# Patient Record
Sex: MALE | Race: WHITE | NOT HISPANIC OR LATINO | Employment: OTHER | URBAN - METROPOLITAN AREA
[De-identification: names, ages, dates, MRNs, and addresses within clinical notes are randomized per-mention and may not be internally consistent; named-entity substitution may affect disease eponyms.]

---

## 2017-01-05 ENCOUNTER — OFFICE VISIT (OUTPATIENT)
Dept: FAMILY MEDICINE | Facility: CLINIC | Age: 57
End: 2017-01-05
Payer: COMMERCIAL

## 2017-01-05 ENCOUNTER — TELEPHONE (OUTPATIENT)
Dept: FAMILY MEDICINE | Facility: CLINIC | Age: 57
End: 2017-01-05

## 2017-01-05 VITALS
HEART RATE: 78 BPM | BODY MASS INDEX: 31.4 KG/M2 | OXYGEN SATURATION: 98 % | WEIGHT: 212 LBS | HEIGHT: 69 IN | DIASTOLIC BLOOD PRESSURE: 88 MMHG | TEMPERATURE: 98.4 F | RESPIRATION RATE: 16 BRPM | SYSTOLIC BLOOD PRESSURE: 132 MMHG

## 2017-01-05 DIAGNOSIS — M94.0 COSTOCHONDRITIS: Primary | ICD-10-CM

## 2017-01-05 DIAGNOSIS — R10.9 FLANK PAIN: ICD-10-CM

## 2017-01-05 LAB
ALBUMIN UR-MCNC: NEGATIVE MG/DL
APPEARANCE UR: CLEAR
BILIRUB UR QL STRIP: NEGATIVE
COLOR UR AUTO: YELLOW
GLUCOSE UR STRIP-MCNC: NEGATIVE MG/DL
HGB UR QL STRIP: ABNORMAL
KETONES UR STRIP-MCNC: NEGATIVE MG/DL
LEUKOCYTE ESTERASE UR QL STRIP: NEGATIVE
MUCOUS THREADS #/AREA URNS LPF: PRESENT /LPF
NITRATE UR QL: NEGATIVE
PH UR STRIP: 7 PH (ref 5–7)
RBC #/AREA URNS AUTO: ABNORMAL /HPF (ref 0–2)
SP GR UR STRIP: 1.01 (ref 1–1.03)
URN SPEC COLLECT METH UR: ABNORMAL
UROBILINOGEN UR STRIP-ACNC: 0.2 EU/DL (ref 0.2–1)
WBC #/AREA URNS AUTO: ABNORMAL /HPF (ref 0–2)

## 2017-01-05 PROCEDURE — 81001 URINALYSIS AUTO W/SCOPE: CPT | Performed by: NURSE PRACTITIONER

## 2017-01-05 PROCEDURE — 99213 OFFICE O/P EST LOW 20 MIN: CPT | Performed by: NURSE PRACTITIONER

## 2017-01-05 RX ORDER — MELOXICAM 7.5 MG/1
7.5 TABLET ORAL DAILY
Qty: 30 TABLET | Refills: 1 | Status: SHIPPED | OUTPATIENT
Start: 2017-01-05 | End: 2018-07-10

## 2017-01-05 NOTE — NURSING NOTE
"Chief Complaint   Patient presents with     Flank Pain       Initial /92 mmHg  Pulse 78  Temp(Src) 98.4  F (36.9  C) (Tympanic)  Resp 16  Ht 5' 8.5\" (1.74 m)  Wt 212 lb (96.163 kg)  BMI 31.76 kg/m2  SpO2 98% Estimated body mass index is 31.76 kg/(m^2) as calculated from the following:    Height as of this encounter: 5' 8.5\" (1.74 m).    Weight as of this encounter: 212 lb (96.163 kg).  BP completed using cuff size: large  "

## 2017-01-05 NOTE — TELEPHONE ENCOUNTER
Spoke with patient regarding trace of blood- push fluids. If pain worsens or has hematuria needs to follow up in clinic. BRET Cooper CNP

## 2017-01-05 NOTE — PATIENT INSTRUCTIONS
Costochondritis  Costochondritis is inflammation of a rib or the cartilage that connects a rib to your breastbone (sternum). It causes tenderness, and sometimes chest pain may be sharp or aching, or it may feel like pressure. Pain may get worse with deep breathing, movement, or exercise. In some cases, the pain is mistaken for a heart attack. Despite this, the condition is not serious. Read on to learn more about the condition and how it can be treated.    What causes costochondritis?  The cause of costochondritis is not completely clear, but it may happen after a chest injury, chest infection or coughing episode. Some physical activities can sometimes lead to costochondritis. Large-breasted women may be more likely to have the condition. Often, the reason for the inflammation is unknown.  Diagnosing costochondritis  There is no test for costochrondritis. The condition is diagnosed by the symptoms you have. In some cases, tests are done to rule out more serious problems. These tests may include imaging tests such as chest X-ray or CT scan.  Treating costochondritis  If an underlying cause is found, treatment for that will likely relieve the problem. Costochondritis often goes away on its own. The course of the condition varies from person to person. It usually lasts from weeks to months. In some cases, mild symptoms continue for months to years. To ease symptoms:    Take medications as directed. These relieve pain and swelling. Ibuprofen or other NSAIDs are often recommended. In some cases, you may be given prescription medication, such as muscle relaxants.    Avoid activities that put stress on the chest or spine.    Apply a heating pad (set to warm, not to high, heat) to the breastbone several times a day.    Perform stretching exercises as directed  Call the health care provider right away if you have any of the following:    Pain that is not relieved by medication    Shortness of breath    Lightheadedness,  dizziness, or fainting    Feeling of irregular heartbeat or fast pulse  Anyone with chest pain should see a doctor, especially those who are older and may be at risk for heart disease.     8314-8690 [employer]. [address] [place] [address], [place] [address]. All rights reserved. This information is not intended as a substitute for professional medical care. Always follow your healthcare professional's instructions.

## 2017-01-05 NOTE — MR AVS SNAPSHOT
After Visit Summary   1/5/2017    Steven Buck    MRN: [id number]           Patient Information     Date Of Birth          1960        Visit Information        Provider Department      1/5/2017 10:40 AM Rima Roman APRN [facility]        Today's Diagnoses     Flank pain    -  1     Costochondritis           Care Instructions      Costochondritis  Costochondritis is inflammation of a rib or the cartilage that connects a rib to your breastbone (sternum). It causes tenderness, and sometimes chest pain may be sharp or aching, or it may feel like pressure. Pain may get worse with deep breathing, movement, or exercise. In some cases, the pain is mistaken for a heart attack. Despite this, the condition is not serious. Read on to learn more about the condition and how it can be treated.    What causes costochondritis?  The cause of costochondritis is not completely clear, but it may happen after a chest injury, chest infection or coughing episode. Some physical activities can sometimes lead to costochondritis. Large-breasted women may be more likely to have the condition. Often, the reason for the inflammation is unknown.  Diagnosing costochondritis  There is no test for costochrondritis. The condition is diagnosed by the symptoms you have. In some cases, tests are done to rule out more serious problems. These tests may include imaging tests such as chest X-ray or CT scan.  Treating costochondritis  If an underlying cause is found, treatment for that will likely relieve the problem. Costochondritis often goes away on its own. The course of the condition varies from person to person. It usually lasts from weeks to months. In some cases, mild symptoms continue for months to years. To ease symptoms:    Take medications as directed. These relieve pain and swelling. Ibuprofen or other NSAIDs are often recommended. In some cases, you may be given prescription medication, such  "as muscle relaxants.    Avoid activities that put stress on the chest or spine.    Apply a heating pad (set to warm, not to high, heat) to the breastbone several times a day.    Perform stretching exercises as directed  Call the health care provider right away if you have any of the following:    Pain that is not relieved by medication    Shortness of breath    Lightheadedness, dizziness, or fainting    Feeling of irregular heartbeat or fast pulse  Anyone with chest pain should see a doctor, especially those who are older and may be at risk for heart disease.     1414-3601 [employer]. [address] [place] [address]. All rights reserved. This information is not intended as a substitute for professional medical care. Always follow your healthcare professional's instructions.              Follow-ups after your visit        Who to contact     If you have questions or need follow up information about today's clinic visit or your schedule please contact [facility] directly at [phone].  Normal or non-critical lab and imaging results will be communicated to you by Enthusehart, letter or phone within 4 business days after the clinic has received the results. If you do not hear from us within 7 days, please contact the clinic through [employer] or phone. If you have a critical or abnormal lab result, we will notify you by phone as soon as possible.  Submit refill requests through Vindi or call your pharmacy and they will forward the refill request to us. Please allow 3 business days for your refill to be completed.          Additional Information About Your Visit        Enthuse[employer] medic Information     Vindi lets you send messages to your doctor, view your test results, renew your prescriptions, schedule appointments and more. To sign up, go to [email or web address] . Click on \"Log in\" on the left side of the screen, which will take you to the Welcome page. Then click on \"Sign up Now\" on " "the right side of the page.     You will be asked to enter the access code listed below, as well as some personal information. Please follow the directions to create your username and password.     Your access code is: [email or web address]  Expires: 2017 11:10 AM     Your access code will  in 90 days. If you need help or a new code, please call your [facility] clinic or [phone].        Care EveryWhere ID     This is your Care EveryWhere ID. This could be used by other organizations to access your [facility] medical records  [id number]        Your Vitals Were     Pulse Temperature Respirations Height BMI (Body Mass Index) Pulse Oximetry    78 98.4  F (36.9  C) (Tympanic) 16 5' 8.5\" (1.74 m) 31.76 kg/m2 98%       Blood Pressure from Last 3 Encounters:   17 142/92   16 139/89   16 139/86    Weight from Last 3 Encounters:   17 212 lb (96.163 kg)   16 201 lb (91.173 kg)   16 202 lb (91.627 kg)              We Performed the Following     *UA reflex to Microscopic and Culture ([facility] (except [place] and [place])          Today's Medication Changes          These changes are accurate as of: 17 11:10 AM.  If you have any questions, ask your nurse or doctor.               Start taking these medicines.        Dose/Directions    meloxicam 7.5 MG tablet   Commonly known as:  MOBIC   Used for:  Costochondritis   Started by:  Rima Roman APRN CNP        Dose:  7.5 mg   Take 1 tablet (7.5 mg) by mouth daily   Quantity:  30 tablet   Refills:  1            Where to get your medicines      These medications were sent to [facility] [place], [place] - [address]  [address] [address]     Phone:  [phone]    - meloxicam 7.5 MG tablet             Primary Care Provider Office Phone # Fax #    Lety Barger [phone]       [facility] [address] [facility] [address] [address]      "   Thank you!     Thank you for choosing [facility]  for your care. Our goal is always to provide you with excellent care. Hearing back from our patients is one way we can continue to improve our services. Please take a few minutes to complete the written survey that you may receive in the mail after your visit with us. Thank you!             Your Updated Medication List - Protect others around you: Learn how to safely use, store and throw away your medicines at www.disposemymeds.org.          This list is accurate as of: 1/5/17 11:10 AM.  Always use your most recent med list.                   Brand Name Dispense Instructions for use    amLODIPine 5 MG tablet    NORVASC    90 tablet    Take 1 tablet (5 mg) by mouth daily       atorvastatin 20 MG tablet    LIPITOR    90 tablet    Take 1 tablet (20 mg) by mouth daily       lisinopril-hydrochlorothiazide 20-25 MG per tablet    PRINZIDE/ZESTORETIC    90 tablet    Take 1 tablet by mouth every morning       meloxicam 7.5 MG tablet    MOBIC    30 tablet    Take 1 tablet (7.5 mg) by mouth daily

## 2017-01-05 NOTE — PROGRESS NOTES
SUBJECTIVE:                                                    Steven Buck is a 56 year old male who presents to clinic today for the following health issues:      FLANK PAIN     Onset: 4 days. Drove down to [place], on cruise wed 28- Monday 1/2. Pain awoke him from sleep early morning hours of 1/2, saw provider on cruise ship. Drove back from [place] with no difficulties.      Description: patient states he was on a cruise last week, unsure if he pulled something dancing or if maybe he has a kidney problem.  Character: Sharp and Stabbing  Location: bilateral flank pain- lateral sides  Radiation: None    Intensity: moderate to severe    Progression of Symptoms:  improving and constant    Accompanying Signs & Symptoms:  Fever/Chills?: no   Gas/Bloating: no   Nausea: YES- seasick but was on a cruise last week  Vomitting: no   Diarrhea?: no   Constipation:no last bowel movement was this morning and normal  Dysuria or Hematuria: no   Denies calf pain or swelling, no dyspnea, no pain with breaths, no dizziness, chest pain, diaphoresis, nausea. No hx of blood clots. Denies dysuria or blood in urine. No hx of kidney stones.    History:   Trauma: no - had started to doing plank exercises to build core and help with chronic low back pain  Previous similar pain: no, had pleurisy 8-10 years ago which feels similar but was in a different location in his back  Previous tests done: Colonoscopy at age 52    Precipitating factors:   Does the pain change with:     Food: no      BM: no     Urination: no   Changes with upper body movement, lateral bending and twisting aggravate, no pain at rest or with walking.     Alleviating factors:  none    Therapies Tried and outcome: flexeril, naproxen, and ketorolac injection    LMP:  not applicable       Problem list and histories reviewed & adjusted, as indicated.  Additional history: as documented    Patient Active Problem List   Diagnosis     Hyperlipidemia LDL goal <100      "Hypertension goal BP (blood pressure) < 140/90     Essential hypertension     Past Surgical History   Procedure Laterality Date     Surgical history of -   1995     RIGHT knee arthroscopic surgery     Surgical history of -   1/16/97     vasectomy     Orthopedic surgery       Repair tendon biceps distal upper extremity  6/27/2014     Procedure: REPAIR TENDON BICEPS DISTAL UPPER EXTREMITY;  Surgeon: Ryan Joe MD;  Location: [facility] OR       Social History   Substance Use Topics     Smoking status: Former Smoker     Quit date: 01/04/1986     Smokeless tobacco: Never Used     Alcohol Use: 0.0 oz/week     0 Standard drinks or equivalent per week      Comment: a few drinks now and then     Family History   Problem Relation Age of Onset     Hypertension Mother      Hypertension Father      C.A.D. Father      Cardiovascular Father      Hypertension Maternal Grandmother      Hypertension Maternal Grandfather      Hypertension Brother            ROS:  Constitutional, HEENT, cardiovascular, pulmonary, gi and gu systems are negative, except as otherwise noted.    OBJECTIVE:                                                    /92 mmHg  Pulse 78  Temp(Src) 98.4  F (36.9  C) (Tympanic)  Resp 16  Ht 5' 8.5\" (1.74 m)  Wt 212 lb (96.163 kg)  BMI 31.76 kg/m2  SpO2 98%  Body mass index is 31.76 kg/(m^2).  GENERAL: healthy, alert and no distress  RESP: lungs clear to auscultation - no rales, rhonchi or wheezes  CV: regular rate and rhythm, normal S1 S2, no S3 or S4, no murmur, click or rub, no peripheral edema   ABDOMEN: soft, nontender, no hepatosplenomegaly, no masses and bowel sounds normal  MS: no chest wall tenderness, pain exacerbated by side flexion of torso.   SKIN: no suspicious lesions or rashes  BACK: no CVA tenderness, no paralumbar tenderness    Diagnostic Test Results:  Results for orders placed or performed in visit on 01/05/17 (from the past 24 hour(s))   *UA reflex to Microscopic and Culture " ([facility] (except [place] and [place])   Result Value Ref Range    Color Urine Yellow     Appearance Urine Clear     Glucose Urine Negative NEG mg/dL    Bilirubin Urine Negative NEG    Ketones Urine Negative NEG mg/dL    Specific Gravity Urine 1.015 1.003 - 1.035    Blood Urine Trace (A) NEG    pH Urine 7.0 5.0 - 7.0 pH    Protein Albumin Urine Negative NEG mg/dL    Urobilinogen Urine 0.2 0.2 - 1.0 EU/dL    Nitrite Urine Negative NEG    Leukocyte Esterase Urine Negative NEG    Source Midstream Urine    Urine Microscopic   Result Value Ref Range    WBC Urine O - 2 0 - 2 /HPF    RBC Urine O - 2 0 - 2 /HPF    Mucous Urine Present (A) NEG /LPF        ASSESSMENT/PLAN:                                                        ICD-10-CM    1. Costochondritis M94.0 meloxicam (MOBIC) 7.5 MG tablet   2. Flank pain R10.9 *UA reflex to Microscopic and Culture ([facility] (except [place] and [place])     Urine Microscopic     Unlikely kidney stone related, although trace of blood is in the urine. Advised to push fluids and return for any worsening pain or hematuria. ? Mild rhabdo from plank exercises, again advised to push fluids and watch for worsening sx Has not done exercise for over 1 week. Apply heat and take NSAID as directed. Gentle stretching as discussed in clinic.     Patient Instructions       Costochondritis  Costochondritis is inflammation of a rib or the cartilage that connects a rib to your breastbone (sternum). It causes tenderness, and sometimes chest pain may be sharp or aching, or it may feel like pressure. Pain may get worse with deep breathing, movement, or exercise. In some cases, the pain is mistaken for a heart attack. Despite this, the condition is not serious. Read on to learn more about the condition and how it can be treated.    What causes costochondritis?  The cause of costochondritis is not completely clear, but it may happen after  a chest injury, chest infection or coughing episode. Some physical activities can sometimes lead to costochondritis. Large-breasted women may be more likely to have the condition. Often, the reason for the inflammation is unknown.  Diagnosing costochondritis  There is no test for costochrondritis. The condition is diagnosed by the symptoms you have. In some cases, tests are done to rule out more serious problems. These tests may include imaging tests such as chest X-ray or CT scan.  Treating costochondritis  If an underlying cause is found, treatment for that will likely relieve the problem. Costochondritis often goes away on its own. The course of the condition varies from person to person. It usually lasts from weeks to months. In some cases, mild symptoms continue for months to years. To ease symptoms:    Take medications as directed. These relieve pain and swelling. Ibuprofen or other NSAIDs are often recommended. In some cases, you may be given prescription medication, such as muscle relaxants.    Avoid activities that put stress on the chest or spine.    Apply a heating pad (set to warm, not to high, heat) to the breastbone several times a day.    Perform stretching exercises as directed  Call the health care provider right away if you have any of the following:    Pain that is not relieved by medication    Shortness of breath    Lightheadedness, dizziness, or fainting    Feeling of irregular heartbeat or fast pulse  Anyone with chest pain should see a doctor, especially those who are older and may be at risk for heart disease.     5088-0537 [employer]. [address] [place] [address], [place] [address]. All rights reserved. This information is not intended as a substitute for professional medical care. Always follow your healthcare professional's instructions.              BRET [facility]

## 2017-03-28 DIAGNOSIS — E78.5 HYPERLIPIDEMIA LDL GOAL <100: ICD-10-CM

## 2017-03-28 DIAGNOSIS — I10 HYPERTENSION GOAL BP (BLOOD PRESSURE) < 140/90: ICD-10-CM

## 2017-03-28 LAB
ANION GAP SERPL CALCULATED.3IONS-SCNC: 7 MMOL/L (ref 3–14)
BUN SERPL-MCNC: 15 MG/DL (ref 7–30)
CALCIUM SERPL-MCNC: 8.9 MG/DL (ref 8.5–10.1)
CHLORIDE SERPL-SCNC: 103 MMOL/L (ref 94–109)
CHOLEST SERPL-MCNC: 191 MG/DL
CO2 SERPL-SCNC: 31 MMOL/L (ref 20–32)
CREAT SERPL-MCNC: 0.86 MG/DL (ref 0.66–1.25)
GFR SERPL CREATININE-BSD FRML MDRD: NORMAL ML/MIN/1.7M2
GLUCOSE SERPL-MCNC: 90 MG/DL (ref 70–99)
HDLC SERPL-MCNC: 83 MG/DL
LDLC SERPL CALC-MCNC: 86 MG/DL
NONHDLC SERPL-MCNC: 108 MG/DL
POTASSIUM SERPL-SCNC: 3.8 MMOL/L (ref 3.4–5.3)
SODIUM SERPL-SCNC: 141 MMOL/L (ref 133–144)
TRIGL SERPL-MCNC: 109 MG/DL

## 2017-03-28 PROCEDURE — 80048 BASIC METABOLIC PNL TOTAL CA: CPT | Performed by: NURSE PRACTITIONER

## 2017-03-28 PROCEDURE — 80061 LIPID PANEL: CPT | Performed by: NURSE PRACTITIONER

## 2017-03-28 PROCEDURE — 36415 COLL VENOUS BLD VENIPUNCTURE: CPT | Performed by: NURSE PRACTITIONER

## 2017-03-28 NOTE — PROGRESS NOTES
All of your lab results are normal.  Cholesterol looks GREAT!    Please notify patient of results.  MARY Ferraro

## 2017-03-28 NOTE — LETTER
[facility]  [address] [address]  [phone]      March 28, 2017      Steven Buck  [address] [address]        Dear Steven,        We are writing to inform you of the results from your recent lab work, included is a copy of those results.  Component      Latest Ref Rng & Units 3/28/2017   Sodium      133 - 144 mmol/L 141   Potassium      3.4 - 5.3 mmol/L 3.8   Chloride      94 - 109 mmol/L 103   Carbon Dioxide      20 - 32 mmol/L 31   Anion Gap      3 - 14 mmol/L 7   Glucose      70 - 99 mg/dL 90   Urea Nitrogen      7 - 30 mg/dL 15   Creatinine      0.66 - 1.25 mg/dL 0.86   GFR Estimate      >60 mL/min/1.7m2 >90 . . .   GFR Estimate If Black      >60 mL/min/1.7m2 >90 . . .   Calcium      8.5 - 10.1 mg/dL 8.9   Cholesterol      <200 mg/dL 191   Triglycerides      <150 mg/dL 109   HDL Cholesterol      >39 mg/dL 83   LDL Cholesterol Calculated      <100 mg/dL 86   Non HDL Cholesterol      <130 mg/dL 108     All of your lab results are normal.   Cholesterol looks GREAT!   If you have any questions, please do not hesitate to call our office.      Sincerely,  MARY Ferraro

## 2017-06-18 DIAGNOSIS — I10 HYPERTENSION GOAL BP (BLOOD PRESSURE) < 140/90: ICD-10-CM

## 2017-06-18 DIAGNOSIS — E78.5 HYPERLIPIDEMIA LDL GOAL <100: ICD-10-CM

## 2017-06-19 NOTE — TELEPHONE ENCOUNTER
Amlodipine and lisinopril/hctz      Last Written Prescription Date: 03/21/2017  Last Fill Quantity: 90, # refills: 0  Last Office Visit with [facility], [facility] or [facility] prescribing provider: 01/05/2017       Potassium   Date Value Ref Range Status   03/28/2017 3.8 3.4 - 5.3 mmol/L Final     Creatinine   Date Value Ref Range Status   03/28/2017 0.86 0.66 - 1.25 mg/dL Final     BP Readings from Last 3 Encounters:   01/05/17 132/88   02/24/16 139/89   01/19/16 139/86         Atorvastatin     Last Written Prescription Date: 03/21/2017  Last Fill Quantity: 90, # refills: 0  Last Office Visit with [facility], [facility] or [facility] prescribing provider: 01/05/2017       Lab Results   Component Value Date    CHOL 191 03/28/2017     Lab Results   Component Value Date    HDL 83 03/28/2017     Lab Results   Component Value Date    LDL 86 03/28/2017     Lab Results   Component Value Date    TRIG 109 03/28/2017     Lab Results   Component Value Date    CHOLHDLRATIO 2.3 02/10/2015       Juan GREER (R)

## 2017-06-23 RX ORDER — AMLODIPINE BESYLATE 5 MG/1
TABLET ORAL
Qty: 90 TABLET | Refills: 2 | Status: SHIPPED | OUTPATIENT
Start: 2017-06-23 | End: 2018-03-20

## 2017-06-23 RX ORDER — ATORVASTATIN CALCIUM 20 MG/1
TABLET, FILM COATED ORAL
Qty: 90 TABLET | Refills: 2 | Status: SHIPPED | OUTPATIENT
Start: 2017-06-23 | End: 2018-03-20

## 2017-06-23 RX ORDER — LISINOPRIL AND HYDROCHLOROTHIAZIDE 20; 25 MG/1; MG/1
TABLET ORAL
Qty: 90 TABLET | Refills: 2 | Status: SHIPPED | OUTPATIENT
Start: 2017-06-23 | End: 2018-03-20

## 2018-01-08 ENCOUNTER — OFFICE VISIT (OUTPATIENT)
Dept: FAMILY MEDICINE | Facility: CLINIC | Age: 58
End: 2018-01-08
Payer: COMMERCIAL

## 2018-01-08 VITALS
HEART RATE: 95 BPM | SYSTOLIC BLOOD PRESSURE: 159 MMHG | WEIGHT: 207 LBS | DIASTOLIC BLOOD PRESSURE: 88 MMHG | HEIGHT: 68 IN | OXYGEN SATURATION: 96 % | TEMPERATURE: 99.5 F | BODY MASS INDEX: 31.37 KG/M2 | RESPIRATION RATE: 24 BRPM

## 2018-01-08 DIAGNOSIS — J20.8 ACUTE VIRAL BRONCHITIS: Primary | ICD-10-CM

## 2018-01-08 PROCEDURE — 99213 OFFICE O/P EST LOW 20 MIN: CPT | Performed by: INTERNAL MEDICINE

## 2018-01-08 RX ORDER — BENZONATATE 200 MG/1
200 CAPSULE ORAL 3 TIMES DAILY PRN
Qty: 21 CAPSULE | Refills: 0 | Status: SHIPPED | OUTPATIENT
Start: 2018-01-08 | End: 2018-07-10

## 2018-01-08 NOTE — NURSING NOTE
"Chief Complaint   Patient presents with     URI     x 4 days, had cold sypmtoms before Ron which seemed to have resolved. no fever associated        Initial /90 (BP Location: Right arm, Patient Position: Chair, Cuff Size: Adult Regular)  Pulse 95  Temp 99.5  F (37.5  C) (Tympanic)  Resp 24  Ht 5' 7.5\" (1.715 m)  Wt 207 lb (93.9 kg)  SpO2 96%  BMI 31.94 kg/m2 Estimated body mass index is 31.94 kg/(m^2) as calculated from the following:    Height as of this encounter: 5' 7.5\" (1.715 m).    Weight as of this encounter: 207 lb (93.9 kg).  Medication Reconciliation: complete   Celine BAILEY CMA ([facility])    "

## 2018-01-08 NOTE — MR AVS SNAPSHOT
After Visit Summary   1/8/2018    Steven Buck    MRN: [id number]           Patient Information     Date Of Birth          1960        Visit Information        Provider Department      1/8/2018 10:20 AM Carlos Alberto Nair MD [facility]        Today's Diagnoses     Acute viral bronchitis    -  1      Care Instructions    If you get any fever or if your symptoms are not improved in a week, please call and I can send an antibiotic over to the pharmacy.      Check your blood pressure a few times per week over the next couple weeks to make sure it is coming back down.  You can also come back here for a free nurse blood pressure check.  If it remains over 140/90, please let us know.        Bronchitis, Viral (Adult)    You have a viral bronchitis. Bronchitis is inflammation and swelling of the lining of the lungs. This is often caused by an infection. Symptoms include a dry, hacking cough that is worse at night. The cough may bring up yellow-green mucus. You may also feel short of breath or wheeze. Other symptoms may include tiredness, chest discomfort, and chills.  Bronchitis that is caused by a virus is not treated with antibiotics. Instead, medicines may be given to help relieve symptoms. Symptoms can last up to 2 weeks, although the cough may last much longer.  This illness is contagious during the first few days and is spread through the air by coughing and sneezing, or by direct contact (touching the sick person and then touching your own eyes, nose, or mouth).  Most viral illnesses resolve within 10 to 14 days with rest and simple home remedies, although they may sometimes last for several weeks.  Home care    If symptoms are severe, rest at home for the first 2 to 3 days. When you go back to your usual activities, don't let yourself get too tired.    Do not smoke. Also avoid being exposed to secondhand smoke.    You may use over-the-counter medicine to control fever or pain,  unless another pain medicine was prescribed. (Note: If you have chronic liver or kidney disease or have ever had a stomach ulcer or gastrointestinal bleeding, talk with your healthcare provider before using these medicines. Also talk to your provider if you are taking medicine to prevent blood clots.) Aspirin should never be given to anyone younger than 18 years of age who is ill with a viral infection or fever. It may cause severe liver or brain damage.    Your appetite may be poor, so a light diet is fine. Avoid dehydration by drinking 6 to 8 glasses of fluids per day (such as water, soft drinks, sports drinks, juices, tea, or soup). Extra fluids will help loosen secretions in the nose and lungs.    Over-the-counter cough, cold, and sore-throat medicines will not shorten the length of the illness, but they may help to reduce symptoms. (Note: Do not use decongestants if you have high blood pressure.)  Follow-up care  Follow up with your healthcare provider, or as advised. If you had an X-ray or ECG (electrocardiogram), a specialist will review it. You will be notified of any new findings that may affect your care.  Note: If you are age 65 or older, or if you have a chronic lung disease or condition that affects your immune system, or you smoke, talk to your healthcare provider about having pneumococcal vaccinations and a yearly influenza vaccination (flu shot).  When to seek medical advice  Call your healthcare provider right away if any of these occur:    Fever of 100.4 F (38 C) or higher    Coughing up increased amounts of colored sputum    Weakness, drowsiness, headache, facial pain, ear pain, or a stiff neck  Call 911, or get immediate medical care  Contact emergency services right away if any of these occur:    Coughing up blood    Worsening weakness, drowsiness, headache, or stiff neck    Trouble breathing, wheezing, or pain with breathing  Date Last Reviewed: 9/13/2015 2000-2017 [employer]  "[address] [place] [address]. All rights reserved. This information is not intended as a substitute for professional medical care. Always follow your healthcare professional's instructions.                Follow-ups after your visit        Who to contact     If you have questions or need follow up information about today's clinic visit or your schedule please contact [facility] directly at [phone].  Normal or non-critical lab and imaging results will be communicated to you by MyChart, letter or phone within 4 business days after the clinic has received the results. If you do not hear from us within 7 days, please contact the clinic through [facility] [employer] or phone. If you have a critical or abnormal lab result, we will notify you by phone as soon as possible.  Submit refill requests through [email or web address] or call your pharmacy and they will forward the refill request to us. Please allow 3 business days for your refill to be completed.          Additional Information About Your Visit        [employer] Information     [email or web address] lets you send messages to your doctor, view your test results, renew your prescriptions, schedule appointments and more. To sign up, go to [email or web address] . Click on \"Log in\" on the left side of the screen, which will take you to the Welcome page. Then click on \"Sign up Now\" on the right side of the page.     You will be asked to enter the access code listed below, as well as some personal information. Please follow the directions to create your username and password.     Your access code is: [email or web address]  Expires: 2018 10:36 AM     Your access code will  in 90 days. If you need help or a new code, please call your [facility] or [phone].        Care EveryWhere ID     This is your Care EveryWhere ID. This could be used by other organizations to access your [facility] medical records  [id number]        Your Vitals Were     Pulse Temperature Respirations " "Height Pulse Oximetry BMI (Body Mass Index)    95 99.5  F (37.5  C) (Tympanic) 24 5' 7.5\" (1.715 m) 96% 31.94 kg/m2       Blood Pressure from Last 3 Encounters:   01/08/18 159/88   01/05/17 132/88   02/24/16 139/89    Weight from Last 3 Encounters:   01/08/18 207 lb (93.9 kg)   01/05/17 212 lb (96.2 kg)   02/24/16 201 lb (91.2 kg)              Today, you had the following     No orders found for display         Today's Medication Changes          These changes are accurate as of: 1/8/18 10:36 AM.  If you have any questions, ask your nurse or doctor.               Start taking these medicines.        Dose/Directions    benzonatate 200 MG capsule   Commonly known as:  TESSALON   Used for:  Acute viral bronchitis   Started by:  Carlos Alberto Nair MD        Dose:  200 mg   Take 1 capsule (200 mg) by mouth 3 times daily as needed for cough   Quantity:  21 capsule   Refills:  0            Where to get your medicines      These medications [place] [facility] [address] [facility]  [address] [facility] [address]     Phone:  [phone]     benzonatate 200 MG capsule                Primary Care Provider Office Phone # Fax #    Lety De Leondeangelo Bargre [phone]       [address] [facility] [address]        Equal Access to Services     TRAVIS BRADLEY AH: Hadii jose carlos ku hadasho Soomaali, waaxda luqadaha, qaybta kaalmada adeegyada, sanjeev mckeon. So [facility] [phone].    ATENCIÓN: Si habla español, tiene a catalan disposición servicios gratuitos de asistencia lingüística. Melonie arnold [phone].    We comply with applicable federal civil rights laws and [place] laws. We do not discriminate on the basis of race, color, national origin, age, disability, sex, sexual orientation, or gender identity.            Thank you!     Thank you for choosing [facility]  for your care. Our goal is always to provide you with excellent care. Hearing back from " our patients is one way we can continue to improve our services. Please take a few minutes to complete the written survey that you may receive in the mail after your visit with us. Thank you!             Your Updated Medication List - Protect others around you: Learn how to safely use, store and throw away your medicines at www.disposemymeds.org.          This list is accurate as of: 1/8/18 10:36 AM.  Always use your most recent med list.                   Brand Name Dispense Instructions for use Diagnosis    amLODIPine 5 MG tablet    NORVASC    90 tablet    TAKE 1 TABLET DAILY    Hypertension goal BP (blood pressure) < 140/90       atorvastatin 20 MG tablet    LIPITOR    90 tablet    TAKE 1 TABLET DAILY    Hyperlipidemia LDL goal <100       benzonatate 200 MG capsule    TESSALON    21 capsule    Take 1 capsule (200 mg) by mouth 3 times daily as needed for cough    Acute viral bronchitis       lisinopril-hydrochlorothiazide 20-25 MG per tablet    PRINZIDE/ZESTORETIC    90 tablet    TAKE 1 TABLET EVERY MORNING    Hypertension goal BP (blood pressure) < 140/90       meloxicam 7.5 MG tablet    MOBIC    30 tablet    Take 1 tablet (7.5 mg) by mouth daily    Costochondritis

## 2018-01-08 NOTE — PATIENT INSTRUCTIONS
If you get any fever or if your symptoms are not improved in a week, please call and I can send an antibiotic over to the pharmacy.      Check your blood pressure a few times per week over the next couple weeks to make sure it is coming back down.  You can also come back here for a free nurse blood pressure check.  If it remains over 140/90, please let us know.        Bronchitis, Viral (Adult)    You have a viral bronchitis. Bronchitis is inflammation and swelling of the lining of the lungs. This is often caused by an infection. Symptoms include a dry, hacking cough that is worse at night. The cough may bring up yellow-green mucus. You may also feel short of breath or wheeze. Other symptoms may include tiredness, chest discomfort, and chills.  Bronchitis that is caused by a virus is not treated with antibiotics. Instead, medicines may be given to help relieve symptoms. Symptoms can last up to 2 weeks, although the cough may last much longer.  This illness is contagious during the first few days and is spread through the air by coughing and sneezing, or by direct contact (touching the sick person and then touching your own eyes, nose, or mouth).  Most viral illnesses resolve within 10 to 14 days with rest and simple home remedies, although they may sometimes last for several weeks.  Home care    If symptoms are severe, rest at home for the first 2 to 3 days. When you go back to your usual activities, don't let yourself get too tired.    Do not smoke. Also avoid being exposed to secondhand smoke.    You may use over-the-counter medicine to control fever or pain, unless another pain medicine was prescribed. (Note: If you have chronic liver or kidney disease or have ever had a stomach ulcer or gastrointestinal bleeding, talk with your healthcare provider before using these medicines. Also talk to your provider if you are taking medicine to prevent blood clots.) Aspirin should never be given to anyone younger than 18  years of age who is ill with a viral infection or fever. It may cause severe liver or brain damage.    Your appetite may be poor, so a light diet is fine. Avoid dehydration by drinking 6 to 8 glasses of fluids per day (such as water, soft drinks, sports drinks, juices, tea, or soup). Extra fluids will help loosen secretions in the nose and lungs.    Over-the-counter cough, cold, and sore-throat medicines will not shorten the length of the illness, but they may help to reduce symptoms. (Note: Do not use decongestants if you have high blood pressure.)  Follow-up care  Follow up with your healthcare provider, or as advised. If you had an X-ray or ECG (electrocardiogram), a specialist will review it. You will be notified of any new findings that may affect your care.  Note: If you are age 65 or older, or if you have a chronic lung disease or condition that affects your immune system, or you smoke, talk to your healthcare provider about having pneumococcal vaccinations and a yearly influenza vaccination (flu shot).  When to seek medical advice  Call your healthcare provider right away if any of these occur:    Fever of 100.4 F (38 C) or higher    Coughing up increased amounts of colored sputum    Weakness, drowsiness, headache, facial pain, ear pain, or a stiff neck  Call 911, or get immediate medical care  Contact emergency services right away if any of these occur:    Coughing up blood    Worsening weakness, drowsiness, headache, or stiff neck    Trouble breathing, wheezing, or pain with breathing  Date Last Reviewed: 9/13/2015 2000-2017 [employer]. [address] [place] [address], [place] [address]. All rights reserved. This information is not intended as a substitute for professional medical care. Always follow your healthcare professional's instructions.

## 2018-01-08 NOTE — PROGRESS NOTES
SUBJECTIVE:   Steven Buck is a 57 year old male who presents to clinic today for the following health issues:  Chief Complaint   Patient presents with     URI     x 4 days, had cold sypmtoms before Ron which seemed to have resolved. no fever associated        ENT Symptoms             Symptoms: cc Present Absent Comment   Fever/Chills   x    Fatigue   x    Muscle Aches   x    Eye Irritation   x    Sneezing   x    Nasal Grey/Drg   x    Sinus Pressure/Pain   x    Loss of smell   x    Dental pain   x    Sore Throat  x  Pain w/ coughing, not swallowing.   Some pain w/ pressure on throat/neck as well   Swollen Glands   x    Ear Pain/Fullness   x    Cough x x  Productive of phlegm   Wheeze   x    Chest Pain   x    Shortness of breath   x    Rash   x    Other         Symptom duration:  x 4 days    Symptom severity:  moderated    Treatments tried:  nyquil - helping   Contacts:  spouse runs        Steven was sick with a URI over [place] time for about 2 weeks but this cleared for about a week before his current symptoms developed, which are mostly just cough, which he didn't really have initially.      Problem list and histories reviewed & adjusted, as indicated.  Additional history: as documented    Patient Active Problem List   Diagnosis     Hyperlipidemia LDL goal <100     Hypertension goal BP (blood pressure) < 140/90     Essential hypertension     Past Surgical History:   Procedure Laterality Date     ORTHOPEDIC SURGERY       REPAIR TENDON BICEPS DISTAL UPPER EXTREMITY  6/27/2014    Procedure: REPAIR TENDON BICEPS DISTAL UPPER EXTREMITY;  Surgeon: Ryan Joe MD;  Location: [facility] OR     SURGICAL HISTORY OF -   1995    RIGHT knee arthroscopic surgery     SURGICAL HISTORY OF -   1/16/97    vasectomy       Social History   Substance Use Topics     Smoking status: Former Smoker     Quit date: 1/4/1986     Smokeless tobacco: Never Used     Alcohol use 0.0 oz/week     0 Standard drinks or  "equivalent per week      Comment: a few drinks now and then     Family History   Problem Relation Age of Onset     Hypertension Mother      Hypertension Father      C.A.D. Father      Cardiovascular Father      Hypertension Maternal Grandmother      Hypertension Maternal Grandfather      Hypertension Brother          Current Outpatient Prescriptions   Medication Sig Dispense Refill     benzonatate (TESSALON) 200 MG capsule Take 1 capsule (200 mg) by mouth 3 times daily as needed for cough 21 capsule 0     atorvastatin (LIPITOR) 20 MG tablet TAKE 1 TABLET DAILY 90 tablet 2     amLODIPine (NORVASC) 5 MG tablet TAKE 1 TABLET DAILY 90 tablet 2     lisinopril-hydrochlorothiazide (PRINZIDE/ZESTORETIC) 20-25 MG per tablet TAKE 1 TABLET EVERY MORNING 90 tablet 2     meloxicam (MOBIC) 7.5 MG tablet Take 1 tablet (7.5 mg) by mouth daily 30 tablet 1     Allergies   Allergen Reactions     Nkda [No Known Drug Allergies]          Reviewed and updated as needed this visit by clinical staffTobacco  Allergies  Med Hx  Surg Hx  Fam Hx  Soc Hx      Reviewed and updated as needed this visit by Provider         ROS:  Constitutional, HEENT, pulmonary systems are negative, except as otherwise noted.      OBJECTIVE:   /88 (BP Location: Right arm, Patient Position: Chair, Cuff Size: Adult Regular)  Pulse 95  Temp 99.5  F (37.5  C) (Tympanic)  Resp 24  Ht 5' 7.5\" (1.715 m)  Wt 207 lb (93.9 kg)  SpO2 96%  BMI 31.94 kg/m2  Body mass index is 31.94 kg/(m^2).    GENERAL: alert and no distress, occasional coughing  EYES: Eyes grossly normal to inspection, PERRL and conjunctivae and sclerae normal  HENT: ear canals and TMs normal, sinuses non tender to percussion, nose and mouth without ulcers or lesions, oropharynx red but no tonsillar exudates  NECK: no adenopathy, no asymmetry, masses, or scars  RESP: lungs clear to auscultation - no rales, rhonchi or wheezes  CV: regular rate and rhythm, normal S1 S2, no S3 or S4, no murmur, " click or rub      ASSESSMENT/PLAN:       1. Acute viral bronchitis    Symptoms are most consistent with bronchitis, likely viral.  His lungs are clear and VSS so pneumonia is less likely and CXR was not done.  Discussed home care for viral bronchitis.  Will try Tessalon for cough, he has used this in the past.  Advised him to call if he develops fevers, worsening symptoms, or is not improved in a week, and I would consider calling an antibiotic in to the pharmacy for him at that point.      - benzonatate (TESSALON) 200 MG capsule; Take 1 capsule (200 mg) by mouth 3 times daily as needed for cough  Dispense: 21 capsule; Refill: 0    Carlos Alberto Nair MD  [facility]

## 2018-03-20 DIAGNOSIS — I10 HYPERTENSION GOAL BP (BLOOD PRESSURE) < 140/90: ICD-10-CM

## 2018-03-20 DIAGNOSIS — E78.5 HYPERLIPIDEMIA LDL GOAL <100: ICD-10-CM

## 2018-03-21 NOTE — TELEPHONE ENCOUNTER
"Requested Prescriptions   Pending Prescriptions Disp Refills     amLODIPine (NORVASC) 5 MG tablet [Pharmacy Med Name: AMLODIPINE BESYLATE TABS 5MG] 90 tablet 2     Sig: TAKE 1 TABLET DAILY    Calcium Channel Blockers Protocol  Failed    3/20/2018 11:41 PM       Failed - Blood pressure under 140/90 in past 12 months    BP Readings from Last 3 Encounters:   01/08/18 159/88   01/05/17 132/88   02/24/16 139/89                Passed - Recent (12 mo) or future (30 days) visit within the authorizing provider's specialty    Patient had office visit in the last 12 months or has a visit in the next 30 days with authorizing provider or within the authorizing provider's specialty.  See \"Patient Info\" tab in inbasket, or \"Choose Columns\" in Meds & Orders section of the refill encounter.           Passed - Patient is age 18 or older       Passed - Normal serum creatinine on file in past 12 months    Recent Labs   Lab Test  03/28/17   0644   CR  0.86             lisinopril-hydrochlorothiazide (PRINZIDE/ZESTORETIC) 20-25 MG per tablet [Pharmacy Med Name: LISINOPRIL/HCTZ TABS 20/25MG] 90 tablet 2     Sig: TAKE 1 TABLET EVERY MORNING    Diuretics (Including Combos) Protocol Failed    3/20/2018 11:41 PM       Failed - Blood pressure under 140/90 in past 12 months    BP Readings from Last 3 Encounters:   01/08/18 159/88   01/05/17 132/88   02/24/16 139/89                Passed - Recent (12 mo) or future (30 days) visit within the authorizing provider's specialty    Patient had office visit in the last 12 months or has a visit in the next 30 days with authorizing provider or within the authorizing provider's specialty.  See \"Patient Info\" tab in inbasket, or \"Choose Columns\" in Meds & Orders section of the refill encounter.           Passed - Patient is age 18 or older       Passed - Normal serum creatinine on file in past 12 months    Recent Labs   Lab Test  03/28/17   0644   CR  0.86             Passed - Normal serum potassium on file " "in past 12 months    Recent Labs   Lab Test  03/28/17   0644   POTASSIUM  3.8                   Passed - Normal serum sodium on file in past 12 months    Recent Labs   Lab Test  03/28/17   0644   NA  141              atorvastatin (LIPITOR) 20 MG tablet [Pharmacy Med Name: ATORVASTATIN TABS 20MG] 90 tablet 2     Sig: TAKE 1 TABLET DAILY    Statins Protocol Passed    3/20/2018 11:41 PM       Passed - LDL on file in past 12 months    Recent Labs   Lab Test  03/28/17   0644   LDL  86            Passed - No abnormal creatine kinase in past 12 months    No lab results found.            Passed - Recent (12 mo) or future (30 days) visit within the authorizing provider's specialty    Patient had office visit in the last 12 months or has a visit in the next 30 days with authorizing provider or within the authorizing provider's specialty.  See \"Patient Info\" tab in inbasket, or \"Choose Columns\" in Meds & Orders section of the refill encounter.           Passed - Patient is age 18 or older        All medications:  Last Written Prescription Date:  06/23/2017  Last Fill Quantity: 90,  # refills: 2   Last office visit: 1/8/2018 with prescribing provider:  01/08/2018  Future Office Visit:      "

## 2018-03-22 RX ORDER — LISINOPRIL AND HYDROCHLOROTHIAZIDE 20; 25 MG/1; MG/1
1 TABLET ORAL EVERY MORNING
Qty: 30 TABLET | Refills: 0 | Status: SHIPPED | OUTPATIENT
Start: 2018-03-22 | End: 2018-07-10

## 2018-03-22 RX ORDER — AMLODIPINE BESYLATE 5 MG/1
5 TABLET ORAL DAILY
Qty: 30 TABLET | Refills: 0 | Status: SHIPPED | OUTPATIENT
Start: 2018-03-22 | End: 2018-06-25

## 2018-03-22 RX ORDER — ATORVASTATIN CALCIUM 20 MG/1
20 TABLET, FILM COATED ORAL DAILY
Qty: 30 TABLET | Refills: 0 | Status: SHIPPED | OUTPATIENT
Start: 2018-03-22 | End: 2018-07-10

## 2018-06-25 ENCOUNTER — TELEPHONE (OUTPATIENT)
Dept: FAMILY MEDICINE | Facility: CLINIC | Age: 58
End: 2018-06-25

## 2018-06-25 DIAGNOSIS — I10 HYPERTENSION GOAL BP (BLOOD PRESSURE) < 140/90: ICD-10-CM

## 2018-06-25 RX ORDER — AMLODIPINE BESYLATE 5 MG/1
5 TABLET ORAL DAILY
Qty: 15 TABLET | Refills: 0 | Status: SHIPPED | OUTPATIENT
Start: 2018-06-25 | End: 2018-07-10

## 2018-06-25 RX ORDER — AMLODIPINE BESYLATE 5 MG/1
5 TABLET ORAL DAILY
Qty: 15 TABLET | Refills: 0 | Status: SHIPPED | OUTPATIENT
Start: 2018-06-25 | End: 2018-06-25

## 2018-06-25 NOTE — TELEPHONE ENCOUNTER
"Reason for Call:  Medication or medication refill:    Do you use a [facility]?  Name of the pharmacy and phone number for the current request:  [facility] [phone]    Name of the medication requested: Amlodipine - Pt states that he needs a refill because he is \"going out of town for a .\"  I did tell pt that he is way overdue for an appt and we could get him in with another provider today and he declined to make an appt. Insisting that we refill med today.  Please call patient and advise.      Amlodipine      Last Written Prescription Date:  3/22/18  Last Fill Quantity: 30,   # refills: 0  Last Office Visit: 16 - Denita  Future Office visit:       Other request:     Can we leave a detailed message on this number? YES    Phone number patient can be reached at: Home number on file [phone](home)    Best Time: any    Call taken on 2018 at 8:23 AM by Sherri Echavarria      "

## 2018-06-25 NOTE — TELEPHONE ENCOUNTER
Medication is being filled for 1 time refill only due to:  Patient needs to be seen because he is due for appt..   Patient scheduled appt with RN 7-10-18.  He states his last 2 appt had to be changed due to Lety being out.  #15 sent to local pharm.  Andreea MULLER RN

## 2018-07-10 ENCOUNTER — RADIANT APPOINTMENT (OUTPATIENT)
Dept: GENERAL RADIOLOGY | Facility: CLINIC | Age: 58
End: 2018-07-10
Attending: NURSE PRACTITIONER
Payer: COMMERCIAL

## 2018-07-10 ENCOUNTER — OFFICE VISIT (OUTPATIENT)
Dept: FAMILY MEDICINE | Facility: CLINIC | Age: 58
End: 2018-07-10
Payer: COMMERCIAL

## 2018-07-10 VITALS
TEMPERATURE: 99.8 F | DIASTOLIC BLOOD PRESSURE: 88 MMHG | WEIGHT: 211 LBS | BODY MASS INDEX: 31.98 KG/M2 | SYSTOLIC BLOOD PRESSURE: 132 MMHG | OXYGEN SATURATION: 96 % | HEART RATE: 100 BPM | HEIGHT: 68 IN

## 2018-07-10 DIAGNOSIS — I10 HYPERTENSION GOAL BP (BLOOD PRESSURE) < 140/90: Primary | ICD-10-CM

## 2018-07-10 DIAGNOSIS — M25.561 RIGHT KNEE PAIN, UNSPECIFIED CHRONICITY: ICD-10-CM

## 2018-07-10 DIAGNOSIS — E78.5 HYPERLIPIDEMIA LDL GOAL <100: ICD-10-CM

## 2018-07-10 LAB
ANION GAP SERPL CALCULATED.3IONS-SCNC: 10 MMOL/L (ref 3–14)
BUN SERPL-MCNC: 23 MG/DL (ref 7–30)
CALCIUM SERPL-MCNC: 9.3 MG/DL (ref 8.5–10.1)
CHLORIDE SERPL-SCNC: 109 MMOL/L (ref 94–109)
CO2 SERPL-SCNC: 24 MMOL/L (ref 20–32)
CREAT SERPL-MCNC: 1.06 MG/DL (ref 0.66–1.25)
GFR SERPL CREATININE-BSD FRML MDRD: 72 ML/MIN/1.7M2
GLUCOSE SERPL-MCNC: 109 MG/DL (ref 70–99)
POTASSIUM SERPL-SCNC: 3.5 MMOL/L (ref 3.4–5.3)
SODIUM SERPL-SCNC: 143 MMOL/L (ref 133–144)

## 2018-07-10 PROCEDURE — 36415 COLL VENOUS BLD VENIPUNCTURE: CPT | Performed by: NURSE PRACTITIONER

## 2018-07-10 PROCEDURE — 80048 BASIC METABOLIC PNL TOTAL CA: CPT | Performed by: NURSE PRACTITIONER

## 2018-07-10 PROCEDURE — 99214 OFFICE O/P EST MOD 30 MIN: CPT | Performed by: NURSE PRACTITIONER

## 2018-07-10 PROCEDURE — 73562 X-RAY EXAM OF KNEE 3: CPT | Mod: RT

## 2018-07-10 RX ORDER — LISINOPRIL AND HYDROCHLOROTHIAZIDE 20; 25 MG/1; MG/1
1 TABLET ORAL EVERY MORNING
Qty: 90 TABLET | Refills: 3 | Status: SHIPPED | OUTPATIENT
Start: 2018-07-10 | End: 2019-04-15

## 2018-07-10 RX ORDER — ATORVASTATIN CALCIUM 20 MG/1
20 TABLET, FILM COATED ORAL DAILY
Qty: 90 TABLET | Refills: 3 | Status: SHIPPED | OUTPATIENT
Start: 2018-07-10 | End: 2019-04-15

## 2018-07-10 RX ORDER — AMLODIPINE BESYLATE 5 MG/1
5 TABLET ORAL DAILY
Qty: 30 TABLET | Refills: 0 | Status: SHIPPED | OUTPATIENT
Start: 2018-07-10 | End: 2018-07-10

## 2018-07-10 RX ORDER — AMLODIPINE BESYLATE 5 MG/1
5 TABLET ORAL DAILY
Qty: 90 TABLET | Refills: 3 | Status: SHIPPED | OUTPATIENT
Start: 2018-07-10 | End: 2019-04-15

## 2018-07-10 RX ORDER — ATORVASTATIN CALCIUM 20 MG/1
20 TABLET, FILM COATED ORAL DAILY
Qty: 30 TABLET | Refills: 0 | Status: SHIPPED | OUTPATIENT
Start: 2018-07-10 | End: 2018-07-10

## 2018-07-10 RX ORDER — LISINOPRIL AND HYDROCHLOROTHIAZIDE 20; 25 MG/1; MG/1
1 TABLET ORAL EVERY MORNING
Qty: 30 TABLET | Refills: 0 | Status: SHIPPED | OUTPATIENT
Start: 2018-07-10 | End: 2018-07-10

## 2018-07-10 NOTE — PATIENT INSTRUCTIONS
Meniscal (Cartilage) Tear        What is a meniscal (cartilage) tear?   The meniscus is a piece of cartilage in the middle of your knee. Cartilage is tough, smooth, rubbery tissue that lines and cushions the surface of the joints. You have a meniscus on the inner side of your knee (the medial meniscus) and a meniscus on the outer side of the knee (the lateral meniscus). Each meniscus attaches to the top of the shinbone (tibia), makes contact with the thighbone (femur), and acts as a shock absorber during weight-bearing activities. If a meniscus tears, it can cause knee pain and can limit motion.   How does it occur?   A meniscal tear can occur when the knee is forcefully twisted or sometimes with minimal or no trauma, such as when you are squatting.   What are the symptoms?   Symptoms may include the following:   You have pain in your knee joint.   You have immediate swelling with fluid in the joint, called an effusion.   You can't fully bend or straighten your leg.   Your knee locks or gets stuck in one place.   You hear a snap or pop at the time of the injury.   A chronic (old) meniscal tear may give you pain on and off during activities, with or without swelling. Your knee may sometimes lock, and you may have stiffness in the knee.   How is it diagnosed?   Your healthcare provider will review your symptoms and how the injury occurred. He or she will ask about your medical history and examine your knee. Your provider will move your knee in several ways that may cause pain along the injured meniscal surface. You may have X-rays to see if the bones in your knee are injured, but a meniscal tear will not show on an X-ray. An MRI scan can help diagnose a meniscal tear.   How is it treated?   To treat this condition:   Put an ice pack, gel pack, or package of frozen vegetables, wrapped in a cloth on the area every 3 to 4 hours, for up to 20 minutes at a time.   Raise the knee on a pillow when you sit or  lie down.   Wrap an elastic bandage around your knee to keep the swelling from getting worse.   Wear a knee immobilizer or other brace as directed by your provider.   Use crutches to prevent further injury while the meniscus heals.   Take an anti-inflammatory medicine such as ibuprofen, or other medicine as directed by your provider. Nonsteroidal anti-inflammatory medicines (NSAIDs) may cause stomach bleeding and other problems. These risks increase with age. Read the label and take as directed. Unless recommended by your healthcare provider, do not take for more than 10 days.   While you are recovering from your injury, you will need to change your sport or activity to one that does not make your condition worse. For example, you may need to swim instead of run.   Arthroscopic surgery is needed to repair or remove large torn pieces of cartilage. The surgery usually takes about an hour. An arthroscope is a tube with a light on the end that projects an image of the inside of your knee onto a TV screen. By putting tools through the end of the arthroscope, the healthcare provider can usually repair or remove the damaged meniscus. Because the meniscus is a valuable shock absorber, the provider will leave as much of the healthy portion of the meniscus as possible during surgery.   You will go home the day of the surgery. You should keep your leg elevated. Take it easy for at least the next 2 to 3 days.   Do not take part in strenuous activities until your healthcare provider advises that you are ready.   How long will the effects last?   If you have a small tear that has not been repaired or removed, you may still be able to function well and be active. However, your knee may sometimes swell, lock, be stiff, or hurt during activities.   If you have surgery, you will need to spend time rehabilitating your knee. Everyone recovers at a different rate, depending on the severity of the injury and their general health. Many  people return to their previous level of activity within a month or so after surgery.   When can I return to my normal activities?   Everyone recovers from an injury at a different rate. Return to your activities depends on how soon your knee recovers, not by how many days or weeks it has been since your injury has occurred. The goal is to return you to your normal activities as soon as is safely possible. If you return too soon you may worsen your injury.   You may safely return to your normal activities when, starting from the top of the list and progressing to the end, each of the following is true:   your injured knee can be fully straightened and bent without pain   your knee and leg have regained normal strength compared to the uninjured knee and leg   your knee is not swollen   you are able to bend, squat, or walk without pain   How can a meniscal tear be prevented?   To help prevent knee cartilage injuries, it helps to have strong thigh and hamstring muscles, and to stretch your legs before and after exercising. In activities such as skiing, be sure your ski bindings are set correctly by a trained professional so that your skis will release when you fall.     Published by [employer] ([place]  This content is reviewed periodically and is subject to change as new health information becomes available. The information is intended to inform and educate and is not a replacement for medical evaluation, advice, diagnosis or treatment by a healthcare professional.   Written by Arnaud Hardy MD, for [employer]   ? 2010 [employer] and/or its affiliates. All Rights Reserved.   Copyright   [employer] 2011                   Meniscal (Cartilage) Tear Rehabilitation Exercises             You may do the first 5 exercises right away. You may do the rest of the exercises when the pain in your knee has decreased.   Passive knee extension: Do this exercise if you are unable to fully extend your knee. While lying on  your back, place a rolled-up towel underneath the heel of your injured leg so the heel is about 6 inches off the ground. Relax your leg muscles and let gravity slowly straighten your knee. You may feel some discomfort while doing this exercise. Try to hold this position for 2 minutes. Repeat 3 times. Do this exercise several times per day. This exercise can also be done while sitting in a chair with your heel on another chair or stool.   Heel slide: Sit on a firm surface with your legs straight in front of you. Slowly slide the heel of your injured leg toward your buttock by pulling your knee toward your chest as you slide the heel. Return to the starting position. Do 3 sets of 10.   Standing calf stretch: Stand facing a wall with your hands on the wall at about eye level. Keep your injured leg back with your heel on the floor. Keep the other leg forward with the knee bent. Turn your back foot slightly inward (as if you were pigeon-toed). Slowly lean into the wall until you feel a stretch in the back of your calf. Hold the stretch for 15 to 30 seconds. Return to the starting position. Repeat 3 times. Do this exercise several times each day.   Hamstring stretch on wall: Lie on your back with your buttocks close to a doorway. Stretch your uninjured leg straight out in front of you on the floor through the doorway. Raise your injured leg and rest it against the wall next to the door frame. Keep your leg as straight as possible. You should feel a stretch in the back of your thigh. Hold this position for 15 to 30 seconds. Repeat 3 times.   Straight leg raise: Lie on your back with your legs straight out in front of you. Bend the knee on your uninjured side and place the foot flat on the floor. Tighten the thigh muscle on your injured side and lift your leg about 8 inches off the floor. Keep your leg straight and your thigh muscle tight. Slowly lower your leg back down to the floor. Do 3 sets of 10.   Wall squat with a  ball: Stand with your back, shoulders, and head against a wall. Look straight ahead. Keep your shoulders relaxed and your feet 3 feet from the wall and shoulder's width apart. Place a soccer or basketball-sized ball behind your back. Keeping your back against the wall, slowly squat down to a 45-degree angle. Your thighs will not yet be parallel to the floor. Hold this position for 10 seconds and then slowly slide back up the wall. Repeat 10 times. Build up to 3 sets of 10.   Step-up: Stand with the foot of your injured leg on a support 3 to 5 inches high (like a small step or block of wood). Keep your other foot flat on the floor. Shift your weight onto the injured leg on the support. Straighten your injured leg as the other leg comes off the floor. Return to the starting position by bending your injured leg and slowly lowering your uninjured leg back to the floor. Do 3 sets of 10.   Knee stabilization: Wrap a piece of elastic tubing around the ankle of the uninjured leg. Tie a knot in the other end of the tubing and close it in a door.   0. Stand facing the door on the leg without tubing and bend your knee slightly, keeping your thigh muscles tight. While maintaining this position, move the leg with the tubing straight back behind you. Do 3 sets of 10.   0. Turn 90 degrees so the leg without tubing is closest to the door. Move the leg with tubing away from your body. Do 3 sets of 10.   0. Turn 90 degrees again so your back is to the door. Move the leg with tubing straight out in front of you. Do 3 sets of 10.   0. Turn your body 90 degrees again so the leg with tubing is closest to the door. Move the leg with tubing across your body. Do 3 sets of 10.   Hold onto a chair if you need help balancing. This exercise can be made even more challenging by standing on a pillow while you move the leg with tubing.   Resisted terminal knee extension: Make a loop from a piece of elastic tubing by tying a knot in both ends.  Close both knots in a door. Step into the loop so the tubing is around the back of your injured leg. Lift the other foot off the ground. Hold onto a chair for balance, if needed. Bend the knee on the leg with tubing about 45 degrees. Slowly straighten your leg, keeping your thigh muscle tight as you do this. Do this 10 times. Do 3 sets. An easier way to do this is to stand on both legs for better support while you do the exercise.   Wobble board exercises:   0. Stand on a wobble board with your feet shoulder width apart. Rock the board forwards and backwards 30 times, then side to side 30 times. Hold on to a chair if you need support.   0. Rotate the wobble board around so that the edge of the board is in contact with the floor at all times. Do this 30 times in a clockwise and then a counterclockwise direction.   0. Balance on the wobble board for as long as you can without letting the edges touch the floor. Try to do this for 2 minutes without touching the floor.   0. Rotate the wobble board in clockwise and counterclockwise circles, but do not allow the edge of the board to touch the floor.   0. When you have mastered exercises A through D, try repeating them while standing on just your injured leg. Once you can do these exercises on one leg, try to do them with your eyes closed. Make sure you have something nearby to support you in case you lose your balance.   Published by [employer].  This content is reviewed periodically and is subject to change as new health information becomes available. The information is intended to inform and educate and is not a replacement for medical evaluation, advice, diagnosis or treatment by a healthcare professional.   Written by Erlinda Valdez, MS, PT, and Roxanne Jerry, PT, [facility], [facility], for [employer].   ? 2010 [facility] and/or its affiliates. All Rights Reserved.   Copyright   [employer] 2011

## 2018-07-10 NOTE — MR AVS SNAPSHOT
After Visit Summary   7/10/2018    Steven Buck    MRN: [id number]           Patient Information     Date Of Birth          1960        Visit Information        Provider Department      7/10/2018 3:00 PM Lety Barger NP [facility]        Today's Diagnoses     Hypertension goal BP (blood pressure) < 140/90    -  1    Right knee pain, unspecified chronicity        Hyperlipidemia LDL goal <100          Care Instructions                   Meniscal (Cartilage) Tear        What is a meniscal (cartilage) tear?   The meniscus is a piece of cartilage in the middle of your knee. Cartilage is tough, smooth, rubbery tissue that lines and cushions the surface of the joints. You have a meniscus on the inner side of your knee (the medial meniscus) and a meniscus on the outer side of the knee (the lateral meniscus). Each meniscus attaches to the top of the shinbone (tibia), makes contact with the thighbone (femur), and acts as a shock absorber during weight-bearing activities. If a meniscus tears, it can cause knee pain and can limit motion.   How does it occur?   A meniscal tear can occur when the knee is forcefully twisted or sometimes with minimal or no trauma, such as when you are squatting.   What are the symptoms?   Symptoms may include the following:   You have pain in your knee joint.   You have immediate swelling with fluid in the joint, called an effusion.   You can't fully bend or straighten your leg.   Your knee locks or gets stuck in one place.   You hear a snap or pop at the time of the injury.   A chronic (old) meniscal tear may give you pain on and off during activities, with or without swelling. Your knee may sometimes lock, and you may have stiffness in the knee.   How is it diagnosed?   Your healthcare provider will review your symptoms and how the injury occurred. He or she will ask about your medical history and examine your knee. Your provider will move your  knee in several ways that may cause pain along the injured meniscal surface. You may have X-rays to see if the bones in your knee are injured, but a meniscal tear will not show on an X-ray. An MRI scan can help diagnose a meniscal tear.   How is it treated?   To treat this condition:   Put an ice pack, gel pack, or package of frozen vegetables, wrapped in a cloth on the area every 3 to 4 hours, for up to 20 minutes at a time.   Raise the knee on a pillow when you sit or lie down.   Wrap an elastic bandage around your knee to keep the swelling from getting worse.   Wear a knee immobilizer or other brace as directed by your provider.   Use crutches to prevent further injury while the meniscus heals.   Take an anti-inflammatory medicine such as ibuprofen, or other medicine as directed by your provider. Nonsteroidal anti-inflammatory medicines (NSAIDs) may cause stomach bleeding and other problems. These risks increase with age. Read the label and take as directed. Unless recommended by your healthcare provider, do not take for more than 10 days.   While you are recovering from your injury, you will need to change your sport or activity to one that does not make your condition worse. For example, you may need to swim instead of run.   Arthroscopic surgery is needed to repair or remove large torn pieces of cartilage. The surgery usually takes about an hour. An arthroscope is a tube with a light on the end that projects an image of the inside of your knee onto a TV screen. By putting tools through the end of the arthroscope, the healthcare provider can usually repair or remove the damaged meniscus. Because the meniscus is a valuable shock absorber, the provider will leave as much of the healthy portion of the meniscus as possible during surgery.   You will go home the day of the surgery. You should keep your leg elevated. Take it easy for at least the next 2 to 3 days.   Do not take part in strenuous activities until your  healthcare provider advises that you are ready.   How long will the effects last?   If you have a small tear that has not been repaired or removed, you may still be able to function well and be active. However, your knee may sometimes swell, lock, be stiff, or hurt during activities.   If you have surgery, you will need to spend time rehabilitating your knee. Everyone recovers at a different rate, depending on the severity of the injury and their general health. Many people return to their previous level of activity within a month or so after surgery.   When can I return to my normal activities?   Everyone recovers from an injury at a different rate. Return to your activities depends on how soon your knee recovers, not by how many days or weeks it has been since your injury has occurred. The goal is to return you to your normal activities as soon as is safely possible. If you return too soon you may worsen your injury.   You may safely return to your normal activities when, starting from the top of the list and progressing to the end, each of the following is true:   your injured knee can be fully straightened and bent without pain   your knee and leg have regained normal strength compared to the uninjured knee and leg   your knee is not swollen   you are able to bend, squat, or walk without pain   How can a meniscal tear be prevented?   To help prevent knee cartilage injuries, it helps to have strong thigh and hamstring muscles, and to stretch your legs before and after exercising. In activities such as skiing, be sure your ski bindings are set correctly by a trained professional so that your skis will release when you fall.     Published by [email or web address].  This content is reviewed periodically and is subject to change as new health information becomes available. The information is intended to inform and educate and is not a replacement for medical evaluation, advice, diagnosis or treatment by a healthcare  professional.   Written by Arnaud Hardy MD, for [employer].   ? 2010 [employer] [facility] and/or its affiliates. All Rights Reserved.   Copyright   Clinical Reference Systems 2011                   Meniscal (Cartilage) Tear Rehabilitation Exercises             You may do the first 5 exercises right away. You may do the rest of the exercises when the pain in your knee has decreased.   Passive knee extension: Do this exercise if you are unable to fully extend your knee. While lying on your back, place a rolled-up towel underneath the heel of your injured leg so the heel is about 6 inches off the ground. Relax your leg muscles and let gravity slowly straighten your knee. You may feel some discomfort while doing this exercise. Try to hold this position for 2 minutes. Repeat 3 times. Do this exercise several times per day. This exercise can also be done while sitting in a chair with your heel on another chair or stool.   Heel slide: Sit on a firm surface with your legs straight in front of you. Slowly slide the heel of your injured leg toward your buttock by pulling your knee toward your chest as you slide the heel. Return to the starting position. Do 3 sets of 10.   Standing calf stretch: Stand facing a wall with your hands on the wall at about eye level. Keep your injured leg back with your heel on the floor. Keep the other leg forward with the knee bent. Turn your back foot slightly inward (as if you were pigeon-toed). Slowly lean into the wall until you feel a stretch in the back of your calf. Hold the stretch for 15 to 30 seconds. Return to the starting position. Repeat 3 times. Do this exercise several times each day.   Hamstring stretch on wall: Lie on your back with your buttocks close to a doorway. Stretch your uninjured leg straight out in front of you on the floor through the doorway. Raise your injured leg and rest it against the wall next to the door frame. Keep your leg as straight as possible. You should feel a  stretch in the back of your thigh. Hold this position for 15 to 30 seconds. Repeat 3 times.   Straight leg raise: Lie on your back with your legs straight out in front of you. Bend the knee on your uninjured side and place the foot flat on the floor. Tighten the thigh muscle on your injured side and lift your leg about 8 inches off the floor. Keep your leg straight and your thigh muscle tight. Slowly lower your leg back down to the floor. Do 3 sets of 10.   Wall squat with a ball: Stand with your back, shoulders, and head against a wall. Look straight ahead. Keep your shoulders relaxed and your feet 3 feet from the wall and shoulder's width apart. Place a soccer or basketball-sized ball behind your back. Keeping your back against the wall, slowly squat down to a 45-degree angle. Your thighs will not yet be parallel to the floor. Hold this position for 10 seconds and then slowly slide back up the wall. Repeat 10 times. Build up to 3 sets of 10.   Step-up: Stand with the foot of your injured leg on a support 3 to 5 inches high (like a small step or block of wood). Keep your other foot flat on the floor. Shift your weight onto the injured leg on the support. Straighten your injured leg as the other leg comes off the floor. Return to the starting position by bending your injured leg and slowly lowering your uninjured leg back to the floor. Do 3 sets of 10.   Knee stabilization: Wrap a piece of elastic tubing around the ankle of the uninjured leg. Tie a knot in the other end of the tubing and close it in a door.   0. Stand facing the door on the leg without tubing and bend your knee slightly, keeping your thigh muscles tight. While maintaining this position, move the leg with the tubing straight back behind you. Do 3 sets of 10.   0. Turn 90 degrees so the leg without tubing is closest to the door. Move the leg with tubing away from your body. Do 3 sets of 10.   0. Turn 90 degrees again so your back is to the door. Move  the leg with tubing straight out in front of you. Do 3 sets of 10.   0. Turn your body 90 degrees again so the leg with tubing is closest to the door. Move the leg with tubing across your body. Do 3 sets of 10.   Hold onto a chair if you need help balancing. This exercise can be made even more challenging by standing on a pillow while you move the leg with tubing.   Resisted terminal knee extension: Make a loop from a piece of elastic tubing by tying a knot in both ends. Close both knots in a door. Step into the loop so the tubing is around the back of your injured leg. Lift the other foot off the ground. Hold onto a chair for balance, if needed. Bend the knee on the leg with tubing about 45 degrees. Slowly straighten your leg, keeping your thigh muscle tight as you do this. Do this 10 times. Do 3 sets. An easier way to do this is to stand on both legs for better support while you do the exercise.   Wobble board exercises:   0. Stand on a wobble board with your feet shoulder width apart. Rock the board forwards and backwards 30 times, then side to side 30 times. Hold on to a chair if you need support.   0. Rotate the wobble board around so that the edge of the board is in contact with the floor at all times. Do this 30 times in a clockwise and then a counterclockwise direction.   0. Balance on the wobble board for as long as you can without letting the edges touch the floor. Try to do this for 2 minutes without touching the floor.   0. Rotate the wobble board in clockwise and counterclockwise circles, but do not allow the edge of the board to touch the floor.   0. When you have mastered exercises A through D, try repeating them while standing on just your injured leg. Once you can do these exercises on one leg, try to do them with your eyes closed. Make sure you have something nearby to support you in case you lose your balance.   Published by [facility].  This content is reviewed periodically and is subject to  change as new health information becomes available. The information is intended to inform and educate and is not a replacement for medical evaluation, advice, diagnosis or treatment by a healthcare professional.   Written by Erlinda Valdez, MS, PT, and Roxanne Jerry PT, [facility], [facility], for [facility].   ? 2010 [facility] and/or its affiliates. All Rights Reserved.   Copyright   Clinical Reference Systems 2011                Follow-ups after your visit        Additional Services     ORTHO  REFERRAL       [facility] is referring you to the Orthopedic  Services at [facility].       The  Representative will assist you in the coordination of your Orthopedic and Musculoskeletal Care as prescribed by your physician.    The  Representative will call you within 1 business day to help schedule your appointment, or you may contact the  Representative at:    All areas ~ [phone]     Type of Referral : Surgical / Specialist       Timeframe requested: Within 2 weeks    Coverage of these services is subject to the terms and limitations of your health insurance plan.  Please call member services at your health plan with any benefit or coverage questions.      If X-rays, CT or MRI's have been performed, please contact the facility where they were done to arrange for , prior to your scheduled appointment.  Please bring this referral request to your appointment and present it to your specialist.                  Future tests that were ordered for you today     Open Future Orders        Priority Expected Expires Ordered    XR Knee Right 3 Views Routine 7/10/2018 7/10/2019 7/10/2018    Basic metabolic panel Routine 7/10/2018 7/10/2019 7/10/2018            Who to contact     If you have questions or need follow up information about today's clinic visit or your schedule please contact [facility] directly at [phone].  Normal or  "non-critical lab and imaging results will be communicated to you by MyChart, letter or phone within 4 business days after the clinic has received the results. If you do not hear from us within 7 days, please contact the clinic through MyChart or phone. If you have a critical or abnormal lab result, we will notify you by phone as soon as possible.  Submit refill requests through [employer] or call your pharmacy and they will forward the refill request to us. Please allow 3 business days for your refill to be completed.          Additional Information About Your Visit        Care EveryWhere ID     This is your Care EveryWhere ID. This could be used by other organizations to access your [place] medical records  [id number]        Your Vitals Were     Pulse Temperature Height Pulse Oximetry BMI (Body Mass Index)       100 99.8  F (37.7  C) (Tympanic) 5' 7.5\" (1.715 m) 96% 32.56 kg/m2        Blood Pressure from Last 3 Encounters:   07/10/18 132/88   01/08/18 159/88   01/05/17 132/88    Weight from Last 3 Encounters:   07/10/18 211 lb (95.7 kg)   01/08/18 207 lb (93.9 kg)   01/05/17 212 lb (96.2 kg)              We Performed the Following     ORTHO  REFERRAL          Today's Medication Changes          These changes are accurate as of 7/10/18  3:10 PM.  If you have any questions, ask your nurse or doctor.               Start taking these medicines.        Dose/Directions    amLODIPine 5 MG tablet   Commonly known as:  NORVASC   Used for:  Hypertension goal BP (blood pressure) < 140/90   Started by:  Lety Barger NP        Dose:  5 mg   Take 1 tablet (5 mg) by mouth daily   Quantity:  90 tablet   Refills:  3       atorvastatin 20 MG tablet   Commonly known as:  LIPITOR   Used for:  Hyperlipidemia LDL goal <100   Started by:  Lety Barger NP        Dose:  20 mg   Take 1 tablet (20 mg) by mouth daily   Quantity:  90 tablet   Refills:  3       lisinopril-hydrochlorothiazide 20-25 MG per tablet "   Commonly known as:  PRINZIDE/ZESTORETIC   Used for:  Hypertension goal BP (blood pressure) < 140/90   Started by:  Lety Barger NP        Dose:  1 tablet   Take 1 tablet by mouth every morning   Quantity:  90 tablet   Refills:  3            Where to get your medicines      These medications were sent to [facility] - [place], [place] - [address]  [address] [address]     Phone:  [phone]     amLODIPine 5 MG tablet    atorvastatin 20 MG tablet    lisinopril-hydrochlorothiazide 20-25 MG per tablet                Primary Care Provider Office Phone # Fax #    Lety Barger [phone] [address] [address]        Equal Access to Services     TRAVIS BRADLEY : Hadii aad ku hadasho Soomaali, waaxda luqadaha, qaybta kaalmada adeegyada, sanjeev mckeon. So [facility] [phone].    ATENCIÓN: Si habla español, tiene a catalan disposición servicios gratuitos de asistencia lingüística. [facility] [phone].    We comply with applicable federal civil rights laws and [place] laws. We do not discriminate on the basis of race, color, national origin, age, disability, sex, sexual orientation, or gender identity.            Thank you!     Thank you for choosing [facility]  for your care. Our goal is always to provide you with excellent care. Hearing back from our patients is one way we can continue to improve our services. Please take a few minutes to complete the written survey that you may receive in the mail after your visit with us. Thank you!             Your Updated Medication List - Protect others around you: Learn how to safely use, store and throw away your medicines at www.disposemymeds.org.          This list is accurate as of 7/10/18  3:10 PM.  Always use your most recent med list.                   Brand Name Dispense Instructions for use Diagnosis    amLODIPine 5 MG tablet     NORVASC    90 tablet    Take 1 tablet (5 mg) by mouth daily    Hypertension goal BP (blood pressure) < 140/90       atorvastatin 20 MG tablet    LIPITOR    90 tablet    Take 1 tablet (20 mg) by mouth daily    Hyperlipidemia LDL goal <100       lisinopril-hydrochlorothiazide 20-25 MG per tablet    PRINZIDE/ZESTORETIC    90 tablet    Take 1 tablet by mouth every morning    Hypertension goal BP (blood pressure) < 140/90

## 2018-07-10 NOTE — LETTER
July 11, 2018      Steven NOMI Buck  [address] [place] [address]        Dear ,    We are writing to inform you of your test results.    All of your lab results are normal.    Resulted Orders   Basic metabolic panel   Result Value Ref Range    Sodium 143 133 - 144 mmol/L    Potassium 3.5 3.4 - 5.3 mmol/L    Chloride 109 94 - 109 mmol/L    Carbon Dioxide 24 20 - 32 mmol/L    Anion Gap 10 3 - 14 mmol/L    Glucose 109 (H) 70 - 99 mg/dL    Urea Nitrogen 23 7 - 30 mg/dL    Creatinine 1.06 0.66 - 1.25 mg/dL    GFR Estimate 72 >60 mL/min/1.7m2      Comment:      Non  GFR Calc    GFR Estimate If Black 88 >60 mL/min/1.7m2      Comment:       GFR Calc    Calcium 9.3 8.5 - 10.1 mg/dL       If you have any questions or concerns, please call the clinic at the number listed above.       Sincerely,        Lety Barger NP.cb

## 2018-07-10 NOTE — PROGRESS NOTES
"  SUBJECTIVE:   Steven Buck is a 57 year old male who presents to clinic today for the following health issues:    Hypertension Follow-up      Outpatient blood pressures are not being checked.    Low Salt Diet: no added salt      Amount of exercise or physical activity: None    Problems taking medications regularly: No    Medication side effects: none    Diet: regular (no restrictions)    Hyperlipidemia Follow-Up      Rate your low fat/cholesterol diet?: good    Taking statin?  Yes, no muscle aches from statin    Other lipid medications/supplements?:  none      Musculoskeletal problem/pain      Duration: years    Description  Location: right knee    Intensity:  moderate    Accompanying signs and symptoms: none    History  Previous similar problem: YES- had knee scoping years prior  Previous evaluation:  none    Precipitating or alleviating factors:  Trauma or overuse: YES  Aggravating factors include: climbing stairs and bending, kneeling    Therapies tried and outcome: rest/inactivity, ice and Ibuprofen    No known trauma or injury.  Works as a  and has to do a lot of repetitive work bending, kneeling, climbing stairs - which aggravate the knee.  Sometime feels like the knee is going to \"give out\"  No weakness or numbness in LE  Sometimes will have swelling - not today.      Problem list and histories reviewed & adjusted, as indicated.  Additional history: as documented    Patient Active Problem List   Diagnosis     Hyperlipidemia LDL goal <100     Hypertension goal BP (blood pressure) < 140/90     Essential hypertension     Past Surgical History:   Procedure Laterality Date     ORTHOPEDIC SURGERY       REPAIR TENDON BICEPS DISTAL UPPER EXTREMITY  6/27/2014    Procedure: REPAIR TENDON BICEPS DISTAL UPPER EXTREMITY;  Surgeon: Ryan Joe MD;  Location: [facility]     SURGICAL HISTORY OF -   1995    RIGHT knee arthroscopic surgery     SURGICAL HISTORY OF -   1/16/97    vasectomy       Social " History   Substance Use Topics     Smoking status: Former Smoker     Quit date: 1/4/1986     Smokeless tobacco: Never Used     Alcohol use 0.0 oz/week     0 Standard drinks or equivalent per week      Comment: a few drinks now and then     Family History   Problem Relation Age of Onset     Hypertension Mother      Hypertension Father      C.A.D. Father      Cardiovascular Father      Hypertension Maternal Grandmother      Hypertension Maternal Grandfather      Hypertension Brother          Current Outpatient Prescriptions   Medication Sig Dispense Refill     amLODIPine (NORVASC) 5 MG tablet Take 1 tablet (5 mg) by mouth daily (Needs follow-up appointment for this medication) 15 tablet 0     atorvastatin (LIPITOR) 20 MG tablet Take 1 tablet (20 mg) by mouth daily (Needs follow-up appointment for this medication) 30 tablet 0     lisinopril-hydrochlorothiazide (PRINZIDE/ZESTORETIC) 20-25 MG per tablet Take 1 tablet by mouth every morning (Needs follow-up appointment for this medication) 30 tablet 0     Allergies   Allergen Reactions     Nkda [No Known Drug Allergies]      Recent Labs   Lab Test  03/28/17   0644  02/26/16   0650 02/10/15  10/14/14   1617  02/14/14   0649  02/18/13   0708   LDL  86  69  105   --   97  194*   HDL  83  82  87   --   83  84   TRIG  109  133  52   --   70  71   ALT   --    --    --   32  34  47   CR  0.86  0.83   --   1.11  0.84  1.03   GFRESTIMATED  >90  Non  GFR Calc    >90  Non  GFR Calc     --   70  >90  76   GFRESTBLACK  >90   GFR Calc    >90   GFR Calc     --   84  >90  >90   POTASSIUM  3.8  3.8   --   4.2  4.3  4.5   TSH   --    --    --    --    --   1.61      BP Readings from Last 3 Encounters:   07/10/18 132/88   01/08/18 159/88   01/05/17 132/88    Wt Readings from Last 3 Encounters:   07/10/18 211 lb (95.7 kg)   01/08/18 207 lb (93.9 kg)   01/05/17 212 lb (96.2 kg)                  Labs reviewed in EPIC    Reviewed  "and updated as needed this visit by clinical staff       Reviewed and updated as needed this visit by Provider         ROS:  Constitutional, HEENT, cardiovascular, pulmonary, GI, , musculoskeletal, neuro, skin, endocrine and psych systems are negative, except as otherwise noted.    OBJECTIVE:     /88  Pulse 100  Temp 99.8  F (37.7  C) (Tympanic)  Ht 5' 7.5\" (1.715 m)  Wt 211 lb (95.7 kg)  SpO2 96%  BMI 32.56 kg/m2  Body mass index is 32.56 kg/(m^2).  GENERAL: healthy, alert and no distress  RESP: lungs clear to auscultation - no rales, rhonchi or wheezes  CV: regular rate and rhythm, normal S1 S2, no S3 or S4, no murmur, click or rub, no peripheral edema and peripheral pulses strong  ABDOMEN: soft, nontender, no hepatosplenomegaly, no masses and bowel sounds normal  MS: extremities normal- no gross deformities noted  knee exam tenderness to palpation-lateral joint line, range of motion flexion mildly reduced, extension full, collateral ligaments intact to stress, positive Rach sign-positive bilateral, negative Lachmann sign      Diagnostic Test Results:  Results for orders placed or performed in visit on 07/10/18 (from the past 24 hour(s))   Basic metabolic panel   Result Value Ref Range    Sodium 143 133 - 144 mmol/L    Potassium 3.5 3.4 - 5.3 mmol/L    Chloride 109 94 - 109 mmol/L    Carbon Dioxide 24 20 - 32 mmol/L    Anion Gap 10 3 - 14 mmol/L    Glucose 109 (H) 70 - 99 mg/dL    Urea Nitrogen 23 7 - 30 mg/dL    Creatinine 1.06 0.66 - 1.25 mg/dL    GFR Estimate 72 >60 mL/min/1.7m2    GFR Estimate If Black 88 >60 mL/min/1.7m2    Calcium 9.3 8.5 - 10.1 mg/dL       ASSESSMENT/PLAN:     1. Right knee pain, unspecified chronicity   Suspect meniscal injury/tear.  Xray does show moderate degenerative changes - would benefit from steroid injection.    Referral placed to Ortho.    - XR Knee Right 3 Views; Future  - ORTHO  REFERRAL    2. Hypertension goal BP (blood pressure) < " 140/90  Controlled.    - amLODIPine (NORVASC) 5 MG tablet; Take 1 tablet (5 mg) by mouth daily  Dispense: 90 tablet; Refill: 3  - lisinopril-hydrochlorothiazide (PRINZIDE/ZESTORETIC) 20-25 MG per tablet; Take 1 tablet by mouth every morning  Dispense: 90 tablet; Refill: 3  - Basic metabolic panel    3. Hyperlipidemia LDL goal <100   Tolerating Statin well.    - atorvastatin (LIPITOR) 20 MG tablet; Take 1 tablet (20 mg) by mouth daily  Dispense: 90 tablet; Refill: 3      Patient Instructions                  Meniscal (Cartilage) Tear        What is a meniscal (cartilage) tear?   The meniscus is a piece of cartilage in the middle of your knee. Cartilage is tough, smooth, rubbery tissue that lines and cushions the surface of the joints. You have a meniscus on the inner side of your knee (the medial meniscus) and a meniscus on the outer side of the knee (the lateral meniscus). Each meniscus attaches to the top of the shinbone (tibia), makes contact with the thighbone (femur), and acts as a shock absorber during weight-bearing activities. If a meniscus tears, it can cause knee pain and can limit motion.   How does it occur?   A meniscal tear can occur when the knee is forcefully twisted or sometimes with minimal or no trauma, such as when you are squatting.   What are the symptoms?   Symptoms may include the following:   You have pain in your knee joint.   You have immediate swelling with fluid in the joint, called an effusion.   You can't fully bend or straighten your leg.   Your knee locks or gets stuck in one place.   You hear a snap or pop at the time of the injury.   A chronic (old) meniscal tear may give you pain on and off during activities, with or without swelling. Your knee may sometimes lock, and you may have stiffness in the knee.   How is it diagnosed?   Your healthcare provider will review your symptoms and how the injury occurred. He or she will ask about your medical history and examine your knee. Your  provider will move your knee in several ways that may cause pain along the injured meniscal surface. You may have X-rays to see if the bones in your knee are injured, but a meniscal tear will not show on an X-ray. An MRI scan can help diagnose a meniscal tear.   How is it treated?   To treat this condition:   Put an ice pack, gel pack, or package of frozen vegetables, wrapped in a cloth on the area every 3 to 4 hours, for up to 20 minutes at a time.   Raise the knee on a pillow when you sit or lie down.   Wrap an elastic bandage around your knee to keep the swelling from getting worse.   Wear a knee immobilizer or other brace as directed by your provider.   Use crutches to prevent further injury while the meniscus heals.   Take an anti-inflammatory medicine such as ibuprofen, or other medicine as directed by your provider. Nonsteroidal anti-inflammatory medicines (NSAIDs) may cause stomach bleeding and other problems. These risks increase with age. Read the label and take as directed. Unless recommended by your healthcare provider, do not take for more than 10 days.   While you are recovering from your injury, you will need to change your sport or activity to one that does not make your condition worse. For example, you may need to swim instead of run.   Arthroscopic surgery is needed to repair or remove large torn pieces of cartilage. The surgery usually takes about an hour. An arthroscope is a tube with a light on the end that projects an image of the inside of your knee onto a TV screen. By putting tools through the end of the arthroscope, the healthcare provider can usually repair or remove the damaged meniscus. Because the meniscus is a valuable shock absorber, the provider will leave as much of the healthy portion of the meniscus as possible during surgery.   You will go home the day of the surgery. You should keep your leg elevated. Take it easy for at least the next 2 to 3 days.   Do not take part in  strenuous activities until your healthcare provider advises that you are ready.   How long will the effects last?   If you have a small tear that has not been repaired or removed, you may still be able to function well and be active. However, your knee may sometimes swell, lock, be stiff, or hurt during activities.   If you have surgery, you will need to spend time rehabilitating your knee. Everyone recovers at a different rate, depending on the severity of the injury and their general health. Many people return to their previous level of activity within a month or so after surgery.   When can I return to my normal activities?   Everyone recovers from an injury at a different rate. Return to your activities depends on how soon your knee recovers, not by how many days or weeks it has been since your injury has occurred. The goal is to return you to your normal activities as soon as is safely possible. If you return too soon you may worsen your injury.   You may safely return to your normal activities when, starting from the top of the list and progressing to the end, each of the following is true:   your injured knee can be fully straightened and bent without pain   your knee and leg have regained normal strength compared to the uninjured knee and leg   your knee is not swollen   you are able to bend, squat, or walk without pain   How can a meniscal tear be prevented?   To help prevent knee cartilage injuries, it helps to have strong thigh and hamstring muscles, and to stretch your legs before and after exercising. In activities such as skiing, be sure your ski bindings are set correctly by a trained professional so that your skis will release when you fall.     Published by [employer].  This content is reviewed periodically and is subject to change as new health information becomes available. The information is intended to inform and educate and is not a replacement for medical evaluation, advice, diagnosis or  treatment by a healthcare professional.   Written by Arnaud Hardy MD, for [facility].   ? 2010 [facility] and/or its affiliates. All Rights Reserved.   Copyright   [employer] 2011                   Meniscal (Cartilage) Tear Rehabilitation Exercises             You may do the first 5 exercises right away. You may do the rest of the exercises when the pain in your knee has decreased.   Passive knee extension: Do this exercise if you are unable to fully extend your knee. While lying on your back, place a rolled-up towel underneath the heel of your injured leg so the heel is about 6 inches off the ground. Relax your leg muscles and let gravity slowly straighten your knee. You may feel some discomfort while doing this exercise. Try to hold this position for 2 minutes. Repeat 3 times. Do this exercise several times per day. This exercise can also be done while sitting in a chair with your heel on another chair or stool.   Heel slide: Sit on a firm surface with your legs straight in front of you. Slowly slide the heel of your injured leg toward your buttock by pulling your knee toward your chest as you slide the heel. Return to the starting position. Do 3 sets of 10.   Standing calf stretch: Stand facing a wall with your hands on the wall at about eye level. Keep your injured leg back with your heel on the floor. Keep the other leg forward with the knee bent. Turn your back foot slightly inward (as if you were pigeon-toed). Slowly lean into the wall until you feel a stretch in the back of your calf. Hold the stretch for 15 to 30 seconds. Return to the starting position. Repeat 3 times. Do this exercise several times each day.   Hamstring stretch on wall: Lie on your back with your buttocks close to a doorway. Stretch your uninjured leg straight out in front of you on the floor through the doorway. Raise your injured leg and rest it against the wall next to the door frame. Keep your leg as straight as  possible. You should feel a stretch in the back of your thigh. Hold this position for 15 to 30 seconds. Repeat 3 times.   Straight leg raise: Lie on your back with your legs straight out in front of you. Bend the knee on your uninjured side and place the foot flat on the floor. Tighten the thigh muscle on your injured side and lift your leg about 8 inches off the floor. Keep your leg straight and your thigh muscle tight. Slowly lower your leg back down to the floor. Do 3 sets of 10.   Wall squat with a ball: Stand with your back, shoulders, and head against a wall. Look straight ahead. Keep your shoulders relaxed and your feet 3 feet from the wall and shoulder's width apart. Place a soccer or basketball-sized ball behind your back. Keeping your back against the wall, slowly squat down to a 45-degree angle. Your thighs will not yet be parallel to the floor. Hold this position for 10 seconds and then slowly slide back up the wall. Repeat 10 times. Build up to 3 sets of 10.   Step-up: Stand with the foot of your injured leg on a support 3 to 5 inches high (like a small step or block of wood). Keep your other foot flat on the floor. Shift your weight onto the injured leg on the support. Straighten your injured leg as the other leg comes off the floor. Return to the starting position by bending your injured leg and slowly lowering your uninjured leg back to the floor. Do 3 sets of 10.   Knee stabilization: Wrap a piece of elastic tubing around the ankle of the uninjured leg. Tie a knot in the other end of the tubing and close it in a door.   0. Stand facing the door on the leg without tubing and bend your knee slightly, keeping your thigh muscles tight. While maintaining this position, move the leg with the tubing straight back behind you. Do 3 sets of 10.   0. Turn 90 degrees so the leg without tubing is closest to the door. Move the leg with tubing away from your body. Do 3 sets of 10.   0. Turn 90 degrees again so  your back is to the door. Move the leg with tubing straight out in front of you. Do 3 sets of 10.   0. Turn your body 90 degrees again so the leg with tubing is closest to the door. Move the leg with tubing across your body. Do 3 sets of 10.   Hold onto a chair if you need help balancing. This exercise can be made even more challenging by standing on a pillow while you move the leg with tubing.   Resisted terminal knee extension: Make a loop from a piece of elastic tubing by tying a knot in both ends. Close both knots in a door. Step into the loop so the tubing is around the back of your injured leg. Lift the other foot off the ground. Hold onto a chair for balance, if needed. Bend the knee on the leg with tubing about 45 degrees. Slowly straighten your leg, keeping your thigh muscle tight as you do this. Do this 10 times. Do 3 sets. An easier way to do this is to stand on both legs for better support while you do the exercise.   Wobble board exercises:   0. Stand on a wobble board with your feet shoulder width apart. Rock the board forwards and backwards 30 times, then side to side 30 times. Hold on to a chair if you need support.   0. Rotate the wobble board around so that the edge of the board is in contact with the floor at all times. Do this 30 times in a clockwise and then a counterclockwise direction.   0. Balance on the wobble board for as long as you can without letting the edges touch the floor. Try to do this for 2 minutes without touching the floor.   0. Rotate the wobble board in clockwise and counterclockwise circles, but do not allow the edge of the board to touch the floor.   0. When you have mastered exercises A through D, try repeating them while standing on just your injured leg. Once you can do these exercises on one leg, try to do them with your eyes closed. Make sure you have something nearby to support you in case you lose your balance.   Published by [employer].  This content is reviewed  periodically and is subject to change as new health information becomes available. The information is intended to inform and educate and is not a replacement for medical evaluation, advice, diagnosis or treatment by a healthcare professional.   Written by Erlinda Valdez MS, PT, and Roxanne Jerry PT, [facility], [facility], for [facility].   ? 2010 [facility] and/or its affiliates. All Rights Reserved.   Copyright   [employer] Reference [employer] 2011            Lety Barger NP  [facility]

## 2018-07-10 NOTE — NURSING NOTE
"Initial /88  Pulse 100  Temp 99.8  F (37.7  C) (Tympanic)  Ht 5' 7.5\" (1.715 m)  Wt 211 lb (95.7 kg)  SpO2 96%  BMI 32.56 kg/m2 Estimated body mass index is 32.56 kg/(m^2) as calculated from the following:    Height as of this encounter: 5' 7.5\" (1.715 m).    Weight as of this encounter: 211 lb (95.7 kg). .    Joyce Buckley, CARSON ([facility])  "

## 2018-10-05 ENCOUNTER — HOSPITAL ENCOUNTER (EMERGENCY)
Facility: CLINIC | Age: 58
Discharge: HOME | End: 2018-10-05
Attending: EMERGENCY MEDICINE | Admitting: EMERGENCY MEDICINE
Payer: COMMERCIAL

## 2018-10-05 ENCOUNTER — APPOINTMENT (OUTPATIENT)
Dept: GENERAL RADIOLOGY | Facility: CLINIC | Age: 58
End: 2018-10-05
Attending: EMERGENCY MEDICINE
Payer: COMMERCIAL

## 2018-10-05 VITALS
SYSTOLIC BLOOD PRESSURE: 121 MMHG | RESPIRATION RATE: 18 BRPM | WEIGHT: 202 LBS | BODY MASS INDEX: 29.92 KG/M2 | DIASTOLIC BLOOD PRESSURE: 86 MMHG | HEIGHT: 69 IN | TEMPERATURE: 98.6 F | OXYGEN SATURATION: 96 %

## 2018-10-05 DIAGNOSIS — R07.9 CHEST PAIN, UNSPECIFIED TYPE: ICD-10-CM

## 2018-10-05 LAB
ALBUMIN SERPL-MCNC: 4.2 G/DL (ref 3.4–5)
ALP SERPL-CCNC: 79 U/L (ref 40–150)
ALT SERPL W P-5'-P-CCNC: 51 U/L (ref 0–70)
ANION GAP SERPL CALCULATED.3IONS-SCNC: 7 MMOL/L (ref 3–14)
AST SERPL W P-5'-P-CCNC: 22 U/L (ref 0–45)
BASOPHILS # BLD AUTO: 0.1 10E9/L (ref 0–0.2)
BASOPHILS NFR BLD AUTO: 0.7 %
BILIRUB SERPL-MCNC: 0.7 MG/DL (ref 0.2–1.3)
BUN SERPL-MCNC: 17 MG/DL (ref 7–30)
CALCIUM SERPL-MCNC: 8.7 MG/DL (ref 8.5–10.1)
CHLORIDE SERPL-SCNC: 104 MMOL/L (ref 94–109)
CO2 SERPL-SCNC: 31 MMOL/L (ref 20–32)
CREAT SERPL-MCNC: 0.91 MG/DL (ref 0.66–1.25)
D DIMER PPP FEU-MCNC: 0.4 UG/ML FEU (ref 0–0.5)
DIFFERENTIAL METHOD BLD: NORMAL
EOSINOPHIL # BLD AUTO: 0 10E9/L (ref 0–0.7)
EOSINOPHIL NFR BLD AUTO: 0.4 %
ERYTHROCYTE [DISTWIDTH] IN BLOOD BY AUTOMATED COUNT: 12.1 % (ref 10–15)
GFR SERPL CREATININE-BSD FRML MDRD: 87 ML/MIN/1.7M2
GLUCOSE SERPL-MCNC: 106 MG/DL (ref 70–99)
HCT VFR BLD AUTO: 44.5 % (ref 40–53)
HGB BLD-MCNC: 15.1 G/DL (ref 13.3–17.7)
IMM GRANULOCYTES # BLD: 0 10E9/L (ref 0–0.4)
IMM GRANULOCYTES NFR BLD: 0.3 %
LYMPHOCYTES # BLD AUTO: 1.5 10E9/L (ref 0.8–5.3)
LYMPHOCYTES NFR BLD AUTO: 15.3 %
MCH RBC QN AUTO: 30.9 PG (ref 26.5–33)
MCHC RBC AUTO-ENTMCNC: 33.9 G/DL (ref 31.5–36.5)
MCV RBC AUTO: 91 FL (ref 78–100)
MONOCYTES # BLD AUTO: 1 10E9/L (ref 0–1.3)
MONOCYTES NFR BLD AUTO: 9.7 %
NEUTROPHILS # BLD AUTO: 7.4 10E9/L (ref 1.6–8.3)
NEUTROPHILS NFR BLD AUTO: 73.6 %
NRBC # BLD AUTO: 0 10*3/UL
NRBC BLD AUTO-RTO: 0 /100
PLATELET # BLD AUTO: 264 10E9/L (ref 150–450)
POTASSIUM SERPL-SCNC: 3.7 MMOL/L (ref 3.4–5.3)
PROT SERPL-MCNC: 8.1 G/DL (ref 6.8–8.8)
RBC # BLD AUTO: 4.88 10E12/L (ref 4.4–5.9)
SODIUM SERPL-SCNC: 142 MMOL/L (ref 133–144)
TROPONIN I SERPL-MCNC: <0.015 UG/L (ref 0–0.04)
WBC # BLD AUTO: 10 10E9/L (ref 4–11)

## 2018-10-05 PROCEDURE — 93010 ELECTROCARDIOGRAM REPORT: CPT | Mod: Z6 | Performed by: EMERGENCY MEDICINE

## 2018-10-05 PROCEDURE — 96374 THER/PROPH/DIAG INJ IV PUSH: CPT | Performed by: EMERGENCY MEDICINE

## 2018-10-05 PROCEDURE — 93005 ELECTROCARDIOGRAM TRACING: CPT | Performed by: EMERGENCY MEDICINE

## 2018-10-05 PROCEDURE — 85025 COMPLETE CBC W/AUTO DIFF WBC: CPT | Performed by: EMERGENCY MEDICINE

## 2018-10-05 PROCEDURE — 99285 EMERGENCY DEPT VISIT HI MDM: CPT | Mod: 25 | Performed by: EMERGENCY MEDICINE

## 2018-10-05 PROCEDURE — 84484 ASSAY OF TROPONIN QUANT: CPT | Performed by: EMERGENCY MEDICINE

## 2018-10-05 PROCEDURE — 71046 X-RAY EXAM CHEST 2 VIEWS: CPT

## 2018-10-05 PROCEDURE — 25000128 H RX IP 250 OP 636: Performed by: EMERGENCY MEDICINE

## 2018-10-05 PROCEDURE — 85379 FIBRIN DEGRADATION QUANT: CPT | Performed by: EMERGENCY MEDICINE

## 2018-10-05 PROCEDURE — 80053 COMPREHEN METABOLIC PANEL: CPT | Performed by: EMERGENCY MEDICINE

## 2018-10-05 RX ORDER — KETOROLAC TROMETHAMINE 30 MG/ML
30 INJECTION, SOLUTION INTRAMUSCULAR; INTRAVENOUS ONCE
Status: COMPLETED | OUTPATIENT
Start: 2018-10-05 | End: 2018-10-05

## 2018-10-05 RX ADMIN — KETOROLAC TROMETHAMINE 30 MG: 30 INJECTION, SOLUTION INTRAMUSCULAR at 15:35

## 2018-10-05 NOTE — ED PROVIDER NOTES
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  He describes some minimal associated shortness of air this afternoon.  Denies any fever cough, no prior such pain.  Is non-smoker, occasional alcohol.  No history of coronary disease, father with coronary artery disease.  He has treated hyperlipidemia and hypertension.  Denies history of or risk factor for DVT/PE    Problem List:    Patient Active Problem List    Diagnosis Date Noted     Essential hypertension 02/24/2016     Priority: Medium     Hypertension goal BP (blood pressure) < 140/90 08/19/2013     Priority: Medium     Hyperlipidemia LDL goal <100 03/08/2013     Priority: Medium        Past Medical History:    Past Medical History:   Diagnosis Date     Hypertension        Past Surgical History:    Past Surgical History:   Procedure Laterality Date     ORTHOPEDIC SURGERY       REPAIR TENDON BICEPS DISTAL UPPER EXTREMITY  6/27/2014    Procedure: REPAIR TENDON BICEPS DISTAL UPPER EXTREMITY;  Surgeon: Ryan Joe MD;  Location: [facility]     SURGICAL HISTORY OF -   1995    RIGHT knee arthroscopic surgery     SURGICAL HISTORY OF -   1/16/97    vasectomy       Family History:    Family History   Problem Relation Age of Onset     Hypertension Mother      Hypertension Father      C.A.D. Father      Cardiovascular Father      Hypertension Maternal Grandmother      Hypertension Maternal Grandfather      Hypertension Brother        Social History:  Marital Status:   [2]  Social History   Substance Use Topics     Smoking status: Former Smoker     Quit date: 1/4/1986      "Smokeless tobacco: Never Used     Alcohol use 0.0 oz/week     0 Standard drinks or equivalent per week      Comment: a few drinks now and then        Medications:      amLODIPine (NORVASC) 5 MG tablet   atorvastatin (LIPITOR) 20 MG tablet   lisinopril-hydrochlorothiazide (PRINZIDE/ZESTORETIC) 20-25 MG per tablet   Omega-3 Fatty Acids (FISH OIL BURP-LESS PO)         Review of Systems  All other systems reviewed and are negative.    Physical Exam   BP: 137/87  Heart Rate: 110  Temp: 98.6  F (37  C)  Resp: 18  Height: 175.3 cm (5' 9\")  Weight: 91.6 kg (202 lb)  SpO2: 98 %      Physical Exam  Nontoxic appearing no respiratory distress alert and oriented ×3  Head atraumatic normocephalic  Neck supple full active painless range of motion  Lungs clear to auscultation  Chest wall nontender  Heart regular no murmur  Abdomen soft nontender bowel sounds positive no masses or HSM  Strength and sensation grossly intact throughout the extremities, gait and station normal  Speech is fluent, good eye contact, thought processes are rational  Lower extremities without swelling, redness or tenderness  Pedal pulses symmetrical and strong    ED Course     ED Course     Procedures  ECG: Normal rate and rhythm, axis and intervals within normal limits, no acute ST or T wave changes, nonspecific ST changes, 1 PVC, no prior for comparison. Read by Dr. Jose Lynch               Critical Care time:  none               Results for orders placed or performed during the hospital encounter of 10/05/18 (from the past 24 hour(s))   CBC with platelets, differential   Result Value Ref Range    WBC 10.0 4.0 - 11.0 10e9/L    RBC Count 4.88 4.4 - 5.9 10e12/L    Hemoglobin 15.1 13.3 - 17.7 g/dL    Hematocrit 44.5 40.0 - 53.0 %    MCV 91 78 - 100 fl    MCH 30.9 26.5 - 33.0 pg    MCHC 33.9 31.5 - 36.5 g/dL    RDW 12.1 10.0 - 15.0 %    Platelet Count 264 150 - 450 10e9/L    Diff Method Automated Method     % Neutrophils 73.6 %    % Lymphocytes 15.3 %    % " Monocytes 9.7 %    % Eosinophils 0.4 %    % Basophils 0.7 %    % Immature Granulocytes 0.3 %    Nucleated RBCs 0 0 /100    Absolute Neutrophil 7.4 1.6 - 8.3 10e9/L    Absolute Lymphocytes 1.5 0.8 - 5.3 10e9/L    Absolute Monocytes 1.0 0.0 - 1.3 10e9/L    Absolute Eosinophils 0.0 0.0 - 0.7 10e9/L    Absolute Basophils 0.1 0.0 - 0.2 10e9/L    Abs Immature Granulocytes 0.0 0 - 0.4 10e9/L    Absolute Nucleated RBC 0.0    Comprehensive metabolic panel   Result Value Ref Range    Sodium 142 133 - 144 mmol/L    Potassium 3.7 3.4 - 5.3 mmol/L    Chloride 104 94 - 109 mmol/L    Carbon Dioxide 31 20 - 32 mmol/L    Anion Gap 7 3 - 14 mmol/L    Glucose 106 (H) 70 - 99 mg/dL    Urea Nitrogen 17 7 - 30 mg/dL    Creatinine 0.91 0.66 - 1.25 mg/dL    GFR Estimate 87 >60 mL/min/1.7m2    GFR Estimate If Black >90 >60 mL/min/1.7m2    Calcium 8.7 8.5 - 10.1 mg/dL    Bilirubin Total 0.7 0.2 - 1.3 mg/dL    Albumin 4.2 3.4 - 5.0 g/dL    Protein Total 8.1 6.8 - 8.8 g/dL    Alkaline Phosphatase 79 40 - 150 U/L    ALT 51 0 - 70 U/L    AST 22 0 - 45 U/L   Troponin I   Result Value Ref Range    Troponin I ES <0.015 0.000 - 0.045 ug/L   D dimer quantitative   Result Value Ref Range    D Dimer 0.4 0.0 - 0.50 ug/ml FEU   XR Chest 2 Views    Narrative    XR CHEST 2 VW 10/5/2018 3:13 PM    HISTORY: Pleuritic chest pain.    COMPARISON: None.      Impression    IMPRESSION: 2 views of the chest show low lung volumes. No acute or  active cardiopulmonary disease is demonstrated.     OREN FAITH MD       Medications   ketorolac (TORADOL) injection 30 mg (30 mg Intravenous Given 10/5/18 3297)       Assessments & Plan (with Medical Decision Making)  58-year-old male presents with left-sided chest pain, pleuritic motion component as well, workup with usual differential considered is unrevealing.  Recommend Tylenol/ibuprofen, activity as tolerated, follow-up primary care, return criteria reviewed.     I have reviewed the nursing notes.    I have reviewed the  findings, diagnosis, plan and need for follow up with the patient.       Discharge Medication List as of 10/5/2018  4:25 PM          Final diagnoses:   Chest pain, unspecified type       10/5/2018   [facility] EMERGENCY DEPARTMENT     Jose Lynch MD  10/05/18 1828

## 2018-10-05 NOTE — ED AVS SNAPSHOT
[facility] Emergency Department    [address] [address]    Phone:  [phone]    Fax:  [phone]                                       Steven Buck   MRN: [id number]    Department:  [facility] Emergency Department   Date of Visit:  10/5/2018           Patient Information     Date Of Birth          1960        Your diagnoses for this visit were:     Chest pain, unspecified type        You were seen by Jose Lynch MD.      Follow-up Information     Follow up with Lety Barger NP.    Specialty:  Nurse Practitioner - Family    Contact information:    [address] [facility] [address] [phone]          Discharge Instructions          *CHEST PAIN, UNCERTAIN CAUSE    Based on your exam today, the exact cause of your chest pain is not certain. Your condition does not seem serious at this time, and your pain does not appear to be coming from your heart. However, sometimes the signs of a serious problem take more time to appear. Therefore, watch for the warning signs listed below.  HOME CARE:    1. Rest today and avoid strenuous activity.  2. Take any prescribed medicine as directed.  FOLLOW UP with your doctor in 1-3 days.   GET PROMPT MEDICAL ATTENTION if any of the following occur:    A change in the type of pain: if it feels different, becomes more severe, lasts longer, or begins to spread into your shoulder, arm, neck, jaw or back    Shortness of breath or increased pain with breathing    Weakness, dizziness, or fainting    Cough with blood or dark colored sputum (phlegm)    Fever over 101  F (38.3  C)    Swelling, pain or redness in one leg    3812-3371 [employer]. [address] [place] [address]. All rights reserved. This information is not intended as a substitute for professional medical care. Always follow your healthcare professional's instructions.  This information has been modified by your health care provider with  permission from the publisher.      Tylenol, ibuprofen, follow-up primary care for further evaluation.  Return here as outlined above    24 Hour Appointment Hotline       To make an appointment at any [facility] clinic, call [phone] ([phone]). If you don't have a family doctor or clinic, we will help you find one. [facility] clinics are conveniently located to serve the needs of you and your family.             Review of your medicines      Our records show that you are taking the medicines listed below. If these are incorrect, please call your family doctor or clinic.        Dose / Directions Last dose taken    amLODIPine 5 MG tablet   Commonly known as:  NORVASC   Dose:  5 mg   Quantity:  90 tablet        Take 1 tablet (5 mg) by mouth daily   Refills:  3        atorvastatin 20 MG tablet   Commonly known as:  LIPITOR   Dose:  20 mg   Quantity:  90 tablet        Take 1 tablet (20 mg) by mouth daily   Refills:  3        FISH OIL BURP-LESS PO   Dose:  1 tablet        Take 1 tablet by mouth every morning   Refills:  0        lisinopril-hydrochlorothiazide 20-25 MG per tablet   Commonly known as:  PRINZIDE/ZESTORETIC   Dose:  1 tablet   Quantity:  90 tablet        Take 1 tablet by mouth every morning   Refills:  3                Procedures and tests performed during your visit     CBC with platelets, differential    Comprehensive metabolic panel    D dimer quantitative    EKG 12-lead, tracing only    Troponin I    XR Chest 2 Views      Orders Needing Specimen Collection     None      Pending Results     No orders found from 10/3/2018 to 10/6/2018.            Pending Culture Results     No orders found from 10/3/2018 to 10/6/2018.            Pending Results Instructions     If you had any lab results that were not finalized at the time of your Discharge, you can call the ED Lab Result RN at [phone]. You will be contacted by this team for any positive Lab results or changes in treatment. The nurses are  available 7 days a week from 10A to 6:30P.  You can leave a message 24 hours per day and they will return your call.        Test Results From Your Hospital Stay        10/5/2018  2:32 PM      Component Results     Component Value Ref Range & Units Status    WBC 10.0 4.0 - 11.0 10e9/L Final    RBC Count 4.88 4.4 - 5.9 10e12/L Final    Hemoglobin 15.1 13.3 - 17.7 g/dL Final    Hematocrit 44.5 40.0 - 53.0 % Final    MCV 91 78 - 100 fl Final    MCH 30.9 26.5 - 33.0 pg Final    MCHC 33.9 31.5 - 36.5 g/dL Final    RDW 12.1 10.0 - 15.0 % Final    Platelet Count 264 150 - 450 10e9/L Final    Diff Method Automated Method  Final    % Neutrophils 73.6 % Final    % Lymphocytes 15.3 % Final    % Monocytes 9.7 % Final    % Eosinophils 0.4 % Final    % Basophils 0.7 % Final    % Immature Granulocytes 0.3 % Final    Nucleated RBCs 0 0 /100 Final    Absolute Neutrophil 7.4 1.6 - 8.3 10e9/L Final    Absolute Lymphocytes 1.5 0.8 - 5.3 10e9/L Final    Absolute Monocytes 1.0 0.0 - 1.3 10e9/L Final    Absolute Eosinophils 0.0 0.0 - 0.7 10e9/L Final    Absolute Basophils 0.1 0.0 - 0.2 10e9/L Final    Abs Immature Granulocytes 0.0 0 - 0.4 10e9/L Final    Absolute Nucleated RBC 0.0  Final         10/5/2018  2:56 PM      Component Results     Component Value Ref Range & Units Status    Sodium 142 133 - 144 mmol/L Final    Potassium 3.7 3.4 - 5.3 mmol/L Final    Chloride 104 94 - 109 mmol/L Final    Carbon Dioxide 31 20 - 32 mmol/L Final    Anion Gap 7 3 - 14 mmol/L Final    Glucose 106 (H) 70 - 99 mg/dL Final    Urea Nitrogen 17 7 - 30 mg/dL Final    Creatinine 0.91 0.66 - 1.25 mg/dL Final    GFR Estimate 87 >60 mL/min/1.7m2 Final    Non  GFR Calc    GFR Estimate If Black >90 >60 mL/min/1.7m2 Final    African American GFR Calc    Calcium 8.7 8.5 - 10.1 mg/dL Final    Bilirubin Total 0.7 0.2 - 1.3 mg/dL Final    Albumin 4.2 3.4 - 5.0 g/dL Final    Protein Total 8.1 6.8 - 8.8 g/dL Final    Alkaline Phosphatase 79 40 - 150 U/L  Final    ALT 51 0 - 70 U/L Final    AST 22 0 - 45 U/L Final         10/5/2018  2:56 PM      Component Results     Component Value Ref Range & Units Status    Troponin I ES <0.015 0.000 - 0.045 ug/L Final    The 99th percentile for upper reference range is 0.045 ug/L.  Troponin values   in the range of 0.045 - 0.120 ug/L may be associated with risks of adverse   clinical events.           10/5/2018  3:29 PM      Component Results     Component Value Ref Range & Units Status    D Dimer 0.4 0.0 - 0.50 ug/ml FEU Final    This D-dimer assay is intended for use in conjunction with a clinical pretest   probability assessment model to exclude pulmonary embolism (PE) and deep   venous thrombosis (DVT) in outpatients suspected of PE or DVT. The cut-off   value is 0.5 ug/mL FEU.           10/5/2018  3:21 PM      Narrative     XR CHEST 2 VW 10/5/2018 3:13 PM    HISTORY: Pleuritic chest pain.    COMPARISON: None.        Impression     IMPRESSION: 2 views of the chest show low lung volumes. No acute or  active cardiopulmonary disease is demonstrated.     OREN FAITH MD                Thank you for choosing [facility]       Thank you for choosing [facility] for your care. Our goal is always to provide you with excellent care. Hearing back from our patients is one way we can continue to improve our services. Please take a few minutes to complete the written survey that you may receive in the mail after you visit with us. Thank you!        Care EveryWhere ID     This is your Care EveryWhere ID. This could be used by other organizations to access your [facility] medical records  [id number]        Equal Access to Services     TRAVIS BRADLEY : Hadii jose carlos ku hadasho Soashtynali, waaxda luqadaha, qaybta kaalmada adeegyada, sanjeev mckeon. So [facility] [phone].    ATENCIÓN: Si habla español, tiene a catalan disposición servicios gratuitos de asistencia lingüística. Llame al [phone].    We comply with applicable federal  civil rights laws and [place] laws. We do not discriminate on the basis of race, color, national origin, age, disability, sex, sexual orientation, or gender identity.            After Visit Summary       This is your record. Keep this with you and show to your community pharmacist(s) and doctor(s) at your next visit.

## 2018-10-05 NOTE — ED NOTES
"Pt c/o \"deep left chest pain\" since last night.  Pain was faint last night and getting worse as the day goes on.  Pain is sharp in nature and worse with deep breath.  Lung sounds clear.  Pain radiates to lt scapular area.  soa this afternoon.  No n/v/d.  Denies leg swelling or pain.  No recent illness.  "

## 2018-10-05 NOTE — DISCHARGE INSTRUCTIONS
*CHEST PAIN, UNCERTAIN CAUSE    Based on your exam today, the exact cause of your chest pain is not certain. Your condition does not seem serious at this time, and your pain does not appear to be coming from your heart. However, sometimes the signs of a serious problem take more time to appear. Therefore, watch for the warning signs listed below.  HOME CARE:    1. Rest today and avoid strenuous activity.  2. Take any prescribed medicine as directed.  FOLLOW UP with your doctor in 1-3 days.   GET PROMPT MEDICAL ATTENTION if any of the following occur:    A change in the type of pain: if it feels different, becomes more severe, lasts longer, or begins to spread into your shoulder, arm, neck, jaw or back    Shortness of breath or increased pain with breathing    Weakness, dizziness, or fainting    Cough with blood or dark colored sputum (phlegm)    Fever over 101  F (38.3  C)    Swelling, pain or redness in one leg    2549-2549 [employer]. [address] [place] [address]. All rights reserved. This information is not intended as a substitute for professional medical care. Always follow your healthcare professional's instructions.  This information has been modified by your health care provider with permission from the publisher.      Tylenol, ibuprofen, follow-up primary care for further evaluation.  Return here as outlined above

## 2018-10-05 NOTE — ED AVS SNAPSHOT
[facility] Emergency Department    [address] [address]    Phone:  [phone]    Fax:  [phone]                                       Steven Buck   MRN: [id number]    Department:  [facility] Emergency Department   Date of Visit:  10/5/2018           After Visit Summary Signature Page     I have received my discharge instructions, and my questions have been answered. I have discussed any challenges I see with this plan with the nurse or doctor.    ..........................................................................................................................................  Patient/Patient Representative Signature      ..........................................................................................................................................  Patient Representative Print Name and Relationship to Patient    ..................................................               ................................................  Date                                   Time    ..........................................................................................................................................  Reviewed by Signature/Title    ...................................................              ..............................................  Date                                               Time          22EPIC Rev 08/18

## 2018-11-13 ENCOUNTER — APPOINTMENT (OUTPATIENT)
Dept: GENERAL RADIOLOGY | Facility: CLINIC | Age: 58
End: 2018-11-13
Attending: EMERGENCY MEDICINE
Payer: COMMERCIAL

## 2018-11-13 ENCOUNTER — HOSPITAL ENCOUNTER (EMERGENCY)
Facility: CLINIC | Age: 58
Discharge: HOME | End: 2018-11-13
Attending: EMERGENCY MEDICINE | Admitting: EMERGENCY MEDICINE
Payer: COMMERCIAL

## 2018-11-13 VITALS
TEMPERATURE: 99 F | OXYGEN SATURATION: 96 % | HEIGHT: 69 IN | BODY MASS INDEX: 29.92 KG/M2 | WEIGHT: 202 LBS | SYSTOLIC BLOOD PRESSURE: 125 MMHG | RESPIRATION RATE: 18 BRPM | DIASTOLIC BLOOD PRESSURE: 83 MMHG

## 2018-11-13 DIAGNOSIS — R07.9 ACUTE CHEST PAIN: ICD-10-CM

## 2018-11-13 LAB
ALBUMIN SERPL-MCNC: 4.3 G/DL (ref 3.4–5)
ALP SERPL-CCNC: 62 U/L (ref 40–150)
ALT SERPL W P-5'-P-CCNC: 34 U/L (ref 0–70)
ANION GAP SERPL CALCULATED.3IONS-SCNC: 7 MMOL/L (ref 3–14)
AST SERPL W P-5'-P-CCNC: 20 U/L (ref 0–45)
BASOPHILS # BLD AUTO: 0.1 10E9/L (ref 0–0.2)
BASOPHILS NFR BLD AUTO: 0.9 %
BILIRUB SERPL-MCNC: 0.8 MG/DL (ref 0.2–1.3)
BUN SERPL-MCNC: 15 MG/DL (ref 7–30)
CALCIUM SERPL-MCNC: 8.9 MG/DL (ref 8.5–10.1)
CHLORIDE SERPL-SCNC: 106 MMOL/L (ref 94–109)
CO2 SERPL-SCNC: 30 MMOL/L (ref 20–32)
CREAT SERPL-MCNC: 0.92 MG/DL (ref 0.66–1.25)
DIFFERENTIAL METHOD BLD: NORMAL
EOSINOPHIL # BLD AUTO: 0 10E9/L (ref 0–0.7)
EOSINOPHIL NFR BLD AUTO: 0.6 %
ERYTHROCYTE [DISTWIDTH] IN BLOOD BY AUTOMATED COUNT: 12.4 % (ref 10–15)
GFR SERPL CREATININE-BSD FRML MDRD: 85 ML/MIN/1.7M2
GLUCOSE SERPL-MCNC: 103 MG/DL (ref 70–99)
HCT VFR BLD AUTO: 44.4 % (ref 40–53)
HGB BLD-MCNC: 14.9 G/DL (ref 13.3–17.7)
IMM GRANULOCYTES # BLD: 0 10E9/L (ref 0–0.4)
IMM GRANULOCYTES NFR BLD: 0.1 %
LYMPHOCYTES # BLD AUTO: 1.3 10E9/L (ref 0.8–5.3)
LYMPHOCYTES NFR BLD AUTO: 19.9 %
MCH RBC QN AUTO: 30.7 PG (ref 26.5–33)
MCHC RBC AUTO-ENTMCNC: 33.6 G/DL (ref 31.5–36.5)
MCV RBC AUTO: 92 FL (ref 78–100)
MONOCYTES # BLD AUTO: 0.6 10E9/L (ref 0–1.3)
MONOCYTES NFR BLD AUTO: 9.6 %
NEUTROPHILS # BLD AUTO: 4.6 10E9/L (ref 1.6–8.3)
NEUTROPHILS NFR BLD AUTO: 68.9 %
NRBC # BLD AUTO: 0 10*3/UL
NRBC BLD AUTO-RTO: 0 /100
PLATELET # BLD AUTO: 241 10E9/L (ref 150–450)
POTASSIUM SERPL-SCNC: 3.6 MMOL/L (ref 3.4–5.3)
PROT SERPL-MCNC: 8.2 G/DL (ref 6.8–8.8)
RBC # BLD AUTO: 4.85 10E12/L (ref 4.4–5.9)
SODIUM SERPL-SCNC: 143 MMOL/L (ref 133–144)
TROPONIN I SERPL-MCNC: <0.015 UG/L (ref 0–0.04)
WBC # BLD AUTO: 6.7 10E9/L (ref 4–11)

## 2018-11-13 PROCEDURE — 99285 EMERGENCY DEPT VISIT HI MDM: CPT | Mod: 25 | Performed by: EMERGENCY MEDICINE

## 2018-11-13 PROCEDURE — 84484 ASSAY OF TROPONIN QUANT: CPT | Performed by: EMERGENCY MEDICINE

## 2018-11-13 PROCEDURE — 93005 ELECTROCARDIOGRAM TRACING: CPT | Performed by: EMERGENCY MEDICINE

## 2018-11-13 PROCEDURE — 85025 COMPLETE CBC W/AUTO DIFF WBC: CPT | Performed by: EMERGENCY MEDICINE

## 2018-11-13 PROCEDURE — 80053 COMPREHEN METABOLIC PANEL: CPT | Performed by: EMERGENCY MEDICINE

## 2018-11-13 PROCEDURE — 93010 ELECTROCARDIOGRAM REPORT: CPT | Mod: Z6 | Performed by: EMERGENCY MEDICINE

## 2018-11-13 PROCEDURE — 25000128 H RX IP 250 OP 636: Performed by: EMERGENCY MEDICINE

## 2018-11-13 PROCEDURE — 96374 THER/PROPH/DIAG INJ IV PUSH: CPT | Performed by: EMERGENCY MEDICINE

## 2018-11-13 PROCEDURE — 71046 X-RAY EXAM CHEST 2 VIEWS: CPT

## 2018-11-13 RX ORDER — HYDROCODONE BITARTRATE AND ACETAMINOPHEN 5; 325 MG/1; MG/1
1-2 TABLET ORAL EVERY 4 HOURS PRN
Qty: 12 TABLET | Refills: 0 | Status: SHIPPED | OUTPATIENT
Start: 2018-11-13 | End: 2019-04-15

## 2018-11-13 RX ORDER — KETOROLAC TROMETHAMINE 30 MG/ML
30 INJECTION, SOLUTION INTRAMUSCULAR; INTRAVENOUS ONCE
Status: COMPLETED | OUTPATIENT
Start: 2018-11-13 | End: 2018-11-13

## 2018-11-13 RX ADMIN — KETOROLAC TROMETHAMINE 30 MG: 30 INJECTION, SOLUTION INTRAMUSCULAR at 14:08

## 2018-11-13 NOTE — ED AVS SNAPSHOT
[facility] Emergency Department    [address] [address]    Phone:  [phone]    Fax:  [phone]                                       Steven Buck   MRN: [id number]    Department:  [facility] Emergency Department   Date of Visit:  11/13/2018           After Visit Summary Signature Page     I have received my discharge instructions, and my questions have been answered. I have discussed any challenges I see with this plan with the nurse or doctor.    ..........................................................................................................................................  Patient/Patient Representative Signature      ..........................................................................................................................................  Patient Representative Print Name and Relationship to Patient    ..................................................               ................................................  Date                                   Time    ..........................................................................................................................................  Reviewed by Signature/Title    ...................................................              ..............................................  Date                                               Time          22EPIC Rev 08/18

## 2018-11-13 NOTE — ED NOTES
Pt presents to ED with complaint of chest pain beginning this morning at 0530. Pain is sharp and located on the left mid-chest. Pt went to work this morning and pain got worse about 1200. Pain currently a 10/10 and is worse with deep breaths. Pt denies SOA. Pt was evaluated for chest pain about 5 weeks ago, but reports this is worse today.    Yes

## 2018-11-13 NOTE — ED AVS SNAPSHOT
[facility] Emergency Department    [address] [facility] [place] [address]    Phone:  [phone]    Fax:  [phone]                                       Steven Buck   MRN: [id number]    Department:  [facility] Emergency Department   Date of Visit:  11/13/2018           Patient Information     Date Of Birth          1960        Your diagnoses for this visit were:     Acute chest pain        You were seen by Grabiel Meier MD.      Follow-up Information     Schedule an appointment as soon as possible for a visit with Lety Barger NP.    Specialty:  Nurse Practitioner - Family    Why:  For re-evaluation if symptoms not resolving over the next several days    Contact information:    [address] [place] [address]  [phone]          Follow up with [facility] Emergency Department.    Specialty:  EMERGENCY MEDICINE    Why:  If symptoms worsen, or for new problems or concerns    Contact information:    [address] [place] [id number] [phone]    Additional information:    The medical center is located at   [address] [place] [address] (between [address]   [facility] [address] in [place], [place] miles north   of [place]).      Discharge References/Attachments     COSTOCHONDRITIS (ENGLISH)    CHEST WALL PAIN, COSTOCHONDRITIS (ENGLISH)      24 Hour Appointment Hotline       To make an appointment at any [facility] clinic, call [phone] ([phone]). If you don't have a family doctor or clinic, we will help you find one. [facility] clinics are conveniently located to serve the needs of you and your family.             Review of your medicines      START taking        Dose / Directions Last dose taken    HYDROcodone-acetaminophen 5-325 MG per tablet   Commonly known as:  NORCO   Dose:  1-2 tablet   Quantity:  12 tablet        Take 1-2 tablets by mouth every 4 hours as needed for moderate to severe pain or severe pain maximum 6 tablet(s) per day   Refills:  0           Our records show that you are taking the medicines listed below. If these are incorrect, please call your family doctor or clinic.        Dose / Directions Last dose taken    amLODIPine 5 MG tablet   Commonly known as:  NORVASC   Dose:  5 mg   Quantity:  90 tablet        Take 1 tablet (5 mg) by mouth daily   Refills:  3        atorvastatin 20 MG tablet   Commonly known as:  LIPITOR   Dose:  20 mg   Quantity:  90 tablet        Take 1 tablet (20 mg) by mouth daily   Refills:  3        FISH OIL BURP-LESS PO   Dose:  1 tablet        Take 1 tablet by mouth every morning   Refills:  0        lisinopril-hydrochlorothiazide 20-25 MG per tablet   Commonly known as:  PRINZIDE/ZESTORETIC   Dose:  1 tablet   Quantity:  90 tablet        Take 1 tablet by mouth every morning   Refills:  3        ONE-A-DAY 50 PLUS PO   Dose:  1 tablet        Take 1 tablet by mouth daily   Refills:  0                Information about OPIOIDS     PRESCRIPTION OPIOIDS: WHAT YOU NEED TO KNOW   We gave you an opioid (narcotic) pain medicine. It is important to manage your pain, but opioids are not always the best choice. You should first try all the other options your care team gave you. Take this medicine for as short a time (and as few doses) as possible.    Some activities can increase your pain, such as bandage changes or therapy sessions. It may help to take your pain medicine 30 to 60 minutes before these activities. Reduce your stress by getting enough sleep, working on hobbies you enjoy and practicing relaxation or meditation. Talk to your care team about ways to manage your pain beyond prescription opioids.    These medicines have risks:    DO NOT drive when on new or higher doses of pain medicine. These medicines can affect your alertness and reaction times, and you could be arrested for driving under the influence (DUI). If you need to use opioids long-term, talk to your care team about driving.    DO NOT operate heavy machinery    DO  NOT do any other dangerous activities while taking these medicines.    DO NOT drink any alcohol while taking these medicines.     If the opioid prescribed includes acetaminophen, DO NOT take with any other medicines that contain acetaminophen. Read all labels carefully. Look for the word  acetaminophen  or  Tylenol.  Ask your pharmacist if you have questions or are unsure.    You can get addicted to pain medicines, especially if you have a history of addiction (chemical, alcohol or substance dependence). Talk to your care team about ways to reduce this risk.    All opioids tend to cause constipation. Drink plenty of water and eat foods that have a lot of fiber, such as fruits, vegetables, prune juice, apple juice and high-fiber cereal. Take a laxative (Miralax, milk of magnesia, Colace, Senna) if you don t move your bowels at least every other day. Other side effects include upset stomach, sleepiness, dizziness, throwing up, tolerance (needing more of the medicine to have the same effect), physical dependence and slowed breathing.    Store your pills in a secure place, locked if possible. We will not replace any lost or stolen medicine. If you don t finish your medicine, please throw away (dispose) as directed by your pharmacist. The [place] Pollution Control [employer] has more information about safe disposal: https://www.pca.[facility].[place].us/living-green/managing-unwanted-medications        Prescriptions were sent or printed at these locations (1 Prescription)                   [id number] - [place], [place] - [address]   [address] [facility] [address]    Telephone:  [phone]   Fax:  [phone]   Hours:                  Printed at Department/Unit printer (1 of 1)         HYDROcodone-acetaminophen (NORCO) 5-325 MG per tablet                Procedures and tests performed during your visit     CBC with platelets differential    Comprehensive metabolic panel    EKG 12 lead     Troponin I    XR Chest 2 Views      Orders Needing Specimen Collection     None      Pending Results     No orders found from 11/11/2018 to 11/14/2018.            Pending Culture Results     No orders found from 11/11/2018 to 11/14/2018.            Pending Results Instructions     If you had any lab results that were not finalized at the time of your Discharge, you can call the ED Lab Result RN at [phone]. You will be contacted by this team for any positive Lab results or changes in treatment. The nurses are available 7 days a week from 10A to 6:30P.  You can leave a message 24 hours per day and they will return your call.        Test Results From Your Hospital Stay        11/13/2018  1:38 PM      Component Results     Component Value Ref Range & Units Status    WBC 6.7 4.0 - 11.0 10e9/L Final    RBC Count 4.85 4.4 - 5.9 10e12/L Final    Hemoglobin 14.9 13.3 - 17.7 g/dL Final    Hematocrit 44.4 40.0 - 53.0 % Final    MCV 92 78 - 100 fl Final    MCH 30.7 26.5 - 33.0 pg Final    MCHC 33.6 31.5 - 36.5 g/dL Final    RDW 12.4 10.0 - 15.0 % Final    Platelet Count 241 150 - 450 10e9/L Final    Diff Method Automated Method  Final    % Neutrophils 68.9 % Final    % Lymphocytes 19.9 % Final    % Monocytes 9.6 % Final    % Eosinophils 0.6 % Final    % Basophils 0.9 % Final    % Immature Granulocytes 0.1 % Final    Nucleated RBCs 0 0 /100 Final    Absolute Neutrophil 4.6 1.6 - 8.3 10e9/L Final    Absolute Lymphocytes 1.3 0.8 - 5.3 10e9/L Final    Absolute Monocytes 0.6 0.0 - 1.3 10e9/L Final    Absolute Eosinophils 0.0 0.0 - 0.7 10e9/L Final    Absolute Basophils 0.1 0.0 - 0.2 10e9/L Final    Abs Immature Granulocytes 0.0 0 - 0.4 10e9/L Final    Absolute Nucleated RBC 0.0  Final         11/13/2018  1:53 PM      Component Results     Component Value Ref Range & Units Status    Sodium 143 133 - 144 mmol/L Final    Potassium 3.6 3.4 - 5.3 mmol/L Final    Chloride 106 94 - 109 mmol/L Final    Carbon Dioxide 30 20 - 32 mmol/L  "Final    Anion Gap 7 3 - 14 mmol/L Final    Glucose 103 (H) 70 - 99 mg/dL Final    Urea Nitrogen 15 7 - 30 mg/dL Final    Creatinine 0.92 0.66 - 1.25 mg/dL Final    GFR Estimate 85 >60 mL/min/1.7m2 Final    Non  GFR Calc    GFR Estimate If Black >90 >60 mL/min/1.7m2 Final    African American GFR Calc    Calcium 8.9 8.5 - 10.1 mg/dL Final    Bilirubin Total 0.8 0.2 - 1.3 mg/dL Final    Albumin 4.3 3.4 - 5.0 g/dL Final    Protein Total 8.2 6.8 - 8.8 g/dL Final    Alkaline Phosphatase 62 40 - 150 U/L Final    ALT 34 0 - 70 U/L Final    AST 20 0 - 45 U/L Final         11/13/2018  1:53 PM      Component Results     Component Value Ref Range & Units Status    Troponin I ES <0.015 0.000 - 0.045 ug/L Final    The 99th percentile for upper reference range is 0.045 ug/L.  Troponin values   in the range of 0.045 - 0.120 ug/L may be associated with risks of adverse   clinical events.           11/13/2018  2:47 PM      Narrative     XR CHEST 2 VW 11/13/2018 2:05 PM    HISTORY: Left-sided chest pain.    COMPARISON: 10/5/2018.        Impression     IMPRESSION: 2 views of the chest show no acute or active  cardiopulmonary disease.     OREN FAITH MD                Thank you for choosing [facility]       Thank you for choosing [facility] for your care. Our goal is always to provide you with excellent care. Hearing back from our patients is one way we can continue to improve our services. Please take a few minutes to complete the written survey that you may receive in the mail after you visit with us. Thank you!        [employer] Information     [employer] lets you send messages to your doctor, view your test results, renew your prescriptions, schedule appointments and more. To sign up, go to [email or web address] . Click on \"Log in\" on the left side of the screen, which will take you to the Welcome page. Then click on \"Sign up Now\" on the right side of the page.     You will be asked to enter the access code listed below, " as well as some personal information. Please follow the directions to create your username and password.     Your access code is: [email or web address]  Expires: 2019  3:05 PM     Your access code will  in 90 days. If you need help or a new code, please call your [facility] clinic or [phone].        Care EveryWhere ID     This is your Care EveryWhere ID. This could be used by other organizations to access your [facility] medical records  [id number]        Equal Access to Services     TRAVIS BRADLEY : Lisa cabral Socarloz, waalexandria copeland, qalorena kaaltraci zuñiga, sanjeev abarca . So [facility] [phone].    ATENCIÓN: Si habla español, tiene a catalan disposición servicios gratuitos de asistencia lingüística. Llame al [phone].    We comply with applicable federal civil rights laws and [place] laws. We do not discriminate on the basis of race, color, national origin, age, disability, sex, sexual orientation, or gender identity.            After Visit Summary       This is your record. Keep this with you and show to your community pharmacist(s) and doctor(s) at your next visit.

## 2018-11-13 NOTE — ED PROVIDER NOTES
"  History     Chief Complaint   Patient presents with     Chest Pain     started at 0530-worse past hour-worse with deep breathing and left arm movement     HPI  Steven Buck is a 58 year old male who presents emergency department for evaluation of left anterior chest pain which suddenly became severe when he heard a \"snap\" in the area of tenderness at approximately noon today.  He has had  recurrent mild episodes of similar left anterior chest pain since he was seen for similar chest pain on 10/5/18.  At approximately 05:30 AM this morning he had a brief and transient episode of this pain without clear precipitant, which spontaneously resolved.  He then had sudden and severe recurrence at approximately noon when he looked upward while at work. Sharp, severe, constant, well localized nonradiating pain which is exacerbated by movement and deep inspiration.  Nothing taken for pain relief.  No cough, hemoptysis, leg pain or leg swelling, or back or flank pain. He was seen in the Emergency Department for similar symptoms 10/5/18 and had an unremarkable evaluation which included a EKG, chest x-ray, CBC, CMP, troponin and d-dimer. He reports he felt improved after he was given Toradol.  No other pertinent history or acute complaints or concerns.    Problem List:    Patient Active Problem List    Diagnosis Date Noted     Essential hypertension 02/24/2016     Priority: Medium     Hypertension goal BP (blood pressure) < 140/90 08/19/2013     Priority: Medium     Hyperlipidemia LDL goal <100 03/08/2013     Priority: Medium        Past Medical History:    Past Medical History:   Diagnosis Date     Hypertension        Past Surgical History:    Past Surgical History:   Procedure Laterality Date     ORTHOPEDIC SURGERY       REPAIR TENDON BICEPS DISTAL UPPER EXTREMITY  6/27/2014    Procedure: REPAIR TENDON BICEPS DISTAL UPPER EXTREMITY;  Surgeon: Ryan Joe MD;  Location: [facility] OR     SURGICAL HISTORY OF -   1995 " "   RIGHT knee arthroscopic surgery     SURGICAL HISTORY OF -   1/16/97    vasectomy       Family History:    Family History   Problem Relation Age of Onset     Hypertension Mother      Hypertension Father      C.A.D. Father      Cardiovascular Father      Hypertension Maternal Grandmother      Hypertension Maternal Grandfather      Hypertension Brother        Social History:  Marital Status:   [2]  Social History   Substance Use Topics     Smoking status: Former Smoker     Quit date: 1/4/1986     Smokeless tobacco: Never Used     Alcohol use 0.0 oz/week     0 Standard drinks or equivalent per week      Comment: a few drinks now and then        Medications:      amLODIPine (NORVASC) 5 MG tablet   atorvastatin (LIPITOR) 20 MG tablet   HYDROcodone-acetaminophen (NORCO) 5-325 MG per tablet   lisinopril-hydrochlorothiazide (PRINZIDE/ZESTORETIC) 20-25 MG per tablet   Multiple Vitamins-Minerals (ONE-A-DAY 50 PLUS PO)   Omega-3 Fatty Acids (FISH OIL BURP-LESS PO)       Review of Systems  As mentioned above in the history present illness.  All other systems were reviewed and are negative.    Physical Exam   BP: (!) 180/96  Heart Rate: 101  Temp: 99  F (37.2  C)  Resp: 16  Height: 175.3 cm (5' 9\")  Weight: 91.6 kg (202 lb)  SpO2: 98 %      Physical Exam   Constitutional: He is oriented to person, place, and time. He appears well-developed and well-nourished. No distress.   HENT:   Head: Normocephalic and atraumatic.   Mouth/Throat: Oropharynx is clear and moist.   Eyes: Conjunctivae and EOM are normal. No scleral icterus.   Neck: Normal range of motion. Neck supple. No JVD present. No tracheal deviation present. No thyromegaly present.   Cardiovascular: Normal rate, regular rhythm, normal heart sounds and intact distal pulses.  Exam reveals no gallop and no friction rub.    No murmur heard.  Pulmonary/Chest: Effort normal and breath sounds normal. No respiratory distress. He has no wheezes. He has no rales. He exhibits " tenderness. He exhibits no crepitus, no edema, no deformity and no swelling.       Musculoskeletal: Normal range of motion. He exhibits no edema or tenderness.   Neurological: He is alert and oriented to person, place, and time. He has normal strength.   Skin: Skin is warm and dry. No rash noted. He is not diaphoretic. No erythema. No pallor.   Psychiatric: He has a normal mood and affect. His behavior is normal.   Nursing note and vitals reviewed.      ED Course     ED Course     Procedures               EKG Interpretation:      Interpreted by Grabiel Meier MD  Time reviewed:   Symptoms at time of EKG: Left-sided chest pain  Rhythm: normal sinus   Rate: normal  Axis: normal  Ectopy: none  Conduction: normal  ST Segments/ T Waves: No ST-T wave changes  Q Waves: none  Comparison to prior: Unchanged from 10/5/18  Clinical Impression: normal EKG           Results for orders placed or performed during the hospital encounter of 11/13/18 (from the past 24 hour(s))   CBC with platelets differential   Result Value Ref Range    WBC 6.7 4.0 - 11.0 10e9/L    RBC Count 4.85 4.4 - 5.9 10e12/L    Hemoglobin 14.9 13.3 - 17.7 g/dL    Hematocrit 44.4 40.0 - 53.0 %    MCV 92 78 - 100 fl    MCH 30.7 26.5 - 33.0 pg    MCHC 33.6 31.5 - 36.5 g/dL    RDW 12.4 10.0 - 15.0 %    Platelet Count 241 150 - 450 10e9/L    Diff Method Automated Method     % Neutrophils 68.9 %    % Lymphocytes 19.9 %    % Monocytes 9.6 %    % Eosinophils 0.6 %    % Basophils 0.9 %    % Immature Granulocytes 0.1 %    Nucleated RBCs 0 0 /100    Absolute Neutrophil 4.6 1.6 - 8.3 10e9/L    Absolute Lymphocytes 1.3 0.8 - 5.3 10e9/L    Absolute Monocytes 0.6 0.0 - 1.3 10e9/L    Absolute Eosinophils 0.0 0.0 - 0.7 10e9/L    Absolute Basophils 0.1 0.0 - 0.2 10e9/L    Abs Immature Granulocytes 0.0 0 - 0.4 10e9/L    Absolute Nucleated RBC 0.0    Comprehensive metabolic panel   Result Value Ref Range    Sodium 143 133 - 144 mmol/L    Potassium 3.6 3.4 - 5.3 mmol/L     "Chloride 106 94 - 109 mmol/L    Carbon Dioxide 30 20 - 32 mmol/L    Anion Gap 7 3 - 14 mmol/L    Glucose 103 (H) 70 - 99 mg/dL    Urea Nitrogen 15 7 - 30 mg/dL    Creatinine 0.92 0.66 - 1.25 mg/dL    GFR Estimate 85 >60 mL/min/1.7m2    GFR Estimate If Black >90 >60 mL/min/1.7m2    Calcium 8.9 8.5 - 10.1 mg/dL    Bilirubin Total 0.8 0.2 - 1.3 mg/dL    Albumin 4.3 3.4 - 5.0 g/dL    Protein Total 8.2 6.8 - 8.8 g/dL    Alkaline Phosphatase 62 40 - 150 U/L    ALT 34 0 - 70 U/L    AST 20 0 - 45 U/L   Troponin I   Result Value Ref Range    Troponin I ES <0.015 0.000 - 0.045 ug/L   XR Chest 2 Views    Narrative    XR CHEST 2 VW 11/13/2018 2:05 PM    HISTORY: Left-sided chest pain.    COMPARISON: 10/5/2018.      Impression    IMPRESSION: 2 views of the chest show no acute or active  cardiopulmonary disease.     OREN FAITH MD     I independently reviewed the X-rays: Agree with the Radiologist's interpretation.    Medications   ketorolac (TORADOL) injection 30 mg (30 mg Intravenous Given 11/13/18 1408)     Mild improvement after Toradol.    Assessments & Plan (with Medical Decision Making)   58-year-old male with recurring well localized left anterior chest wall pain in the past 5 weeks with an acute exacerbation shortly prior to arrival.  Sudden severe pain when he looked upward at work, associated with a \"snap\".  History and exam are consistent with a costochondral strain/sprain or costochondritis.  Doubt atypical ACS, PE/DVT, aneurysm/dissection or emergent disease process.  EKG, chest x-ray and laboratory evaluation was unremarkable.  He appears stable and appropriate for discharge home with continuation of NSAID and Rx Norco x 12 tabs.  He was instructed in supportive care and recommended for primary care clinic recheck in the near future.    I have reviewed the nursing notes.    I have reviewed the findings, diagnosis, plan and need for follow up with the patient.    Discharge Medication List as of 11/13/2018  3:05 PM "      START taking these medications    Details   HYDROcodone-acetaminophen (NORCO) 5-325 MG per tablet Take 1-2 tablets by mouth every 4 hours as needed for moderate to severe pain or severe pain maximum 6 tablet(s) per day, Disp-12 tablet, R-0, Local Print             Final diagnoses:   Acute chest pain       11/13/2018   [facility] EMERGENCY DEPARTMENT     Grabiel Meier MD  11/13/18 [id number]

## 2019-01-11 ENCOUNTER — TELEPHONE (OUTPATIENT)
Dept: FAMILY MEDICINE | Facility: CLINIC | Age: 59
End: 2019-01-11

## 2019-01-11 NOTE — TELEPHONE ENCOUNTER
Reason for Call:  Refill for prescription    Detailed comments: Patient called and would like refills on these medications.    Phone Number Patient can be reached at: Home number on file [phone] (home) or Cell number on file:    Telephone Information:   Mobile [phone]       Best Time: Any    Can we leave a detailed message on this number? YES    Atorvastain 20 MG  Last Written Prescription Date:  7/10/2018  Last Fill Quantity: 30,  # refills: 3   Last office visit: 7/10/2018 with prescribing provider:  Denita       Lisinopril /HCTZ  Last Written Prescription Date:  7/10/18  Last Fill Quantity: 30,  # refills: 3   Last office visit: 7/10/2018 with prescribing provider:  Denita       Amlodipine 5MG  Last Written Prescription Date:  7/10/18  Last Fill Quantity: 90,  # refills: 3   Last office visit: 7/10/2018 with prescribing provider:  Denita       Call taken on 1/11/2019 at 1:49 PM by Brandi Kim

## 2019-04-15 ENCOUNTER — OFFICE VISIT (OUTPATIENT)
Dept: FAMILY MEDICINE | Facility: CLINIC | Age: 59
End: 2019-04-15
Payer: COMMERCIAL

## 2019-04-15 VITALS
WEIGHT: 207.4 LBS | HEIGHT: 67 IN | TEMPERATURE: 98.8 F | RESPIRATION RATE: 16 BRPM | HEART RATE: 110 BPM | OXYGEN SATURATION: 97 % | DIASTOLIC BLOOD PRESSURE: 88 MMHG | BODY MASS INDEX: 32.55 KG/M2 | SYSTOLIC BLOOD PRESSURE: 138 MMHG

## 2019-04-15 DIAGNOSIS — E78.5 HYPERLIPIDEMIA LDL GOAL <100: ICD-10-CM

## 2019-04-15 DIAGNOSIS — I10 HYPERTENSION GOAL BP (BLOOD PRESSURE) < 140/90: ICD-10-CM

## 2019-04-15 PROCEDURE — 99214 OFFICE O/P EST MOD 30 MIN: CPT | Performed by: NURSE PRACTITIONER

## 2019-04-15 RX ORDER — ATORVASTATIN CALCIUM 20 MG/1
20 TABLET, FILM COATED ORAL DAILY
Qty: 90 TABLET | Refills: 3 | Status: SHIPPED | OUTPATIENT
Start: 2019-04-15 | End: 2020-04-09

## 2019-04-15 RX ORDER — AMLODIPINE BESYLATE 5 MG/1
7.5 TABLET ORAL DAILY
Qty: 135 TABLET | Refills: 3 | Status: SHIPPED | OUTPATIENT
Start: 2019-04-15 | End: 2020-07-07

## 2019-04-15 RX ORDER — LISINOPRIL AND HYDROCHLOROTHIAZIDE 20; 25 MG/1; MG/1
1 TABLET ORAL EVERY MORNING
Qty: 90 TABLET | Refills: 3 | Status: SHIPPED | OUTPATIENT
Start: 2019-04-15 | End: 2020-04-09

## 2019-04-15 ASSESSMENT — MIFFLIN-ST. JEOR: SCORE: 1729.39

## 2019-04-15 NOTE — PATIENT INSTRUCTIONS
Increase Norvasc to 7.5 mg daily  Continue Prinzide 20/25 mg 1 tablet daily     Schedule lab appointment           Thank you for choosing [facility].  You may be receiving an email and/or telephone survey request from [facility] regarding your visit today.  Please take a few minutes to respond to the survey to let us know how we are doing.      If you have questions or concerns, please contact us via [facility] or you can contact your care team at [phone].    Our Clinic hours are:  Monday 6:40 am  to 7:00 pm  Tuesday -Friday 6:40 am to 5:00 pm    The [facility] outpatient lab hours are:  Monday - Friday 6:10 am to 4:45 pm  Saturdays 7:00 am to 11:00 am  Appointments are required, call [phone]    If you have clinical questions after hours or would like to schedule an appointment,  call the clinic at [phone].

## 2019-04-15 NOTE — PROGRESS NOTES
"  SUBJECTIVE:   Steven Buck is a 58 year old male who presents to clinic today for the following health issues:    HPI  Hyperlipidemia Follow-Up      Rate your low fat/cholesterol diet?: fair    Taking statin?  Yes, no muscle aches from statin    Other lipid medications/supplements?:  Fish oil/Omega 3, dose 1000mg  without side effects    Hypertension Follow-up      Outpatient blood pressures are not being checked.    Low Salt Diet: no added salt    Additional history: as documented    Reviewed and updated as needed this visit by clinical staff  Tobacco  Allergies  Meds  Med Hx  Surg Hx  Fam Hx  Soc Hx        Reviewed and updated as needed this visit by Provider             BP Readings from Last 3 Encounters:   04/15/19 138/88   11/13/18 125/83   10/05/18 121/86    Wt Readings from Last 3 Encounters:   04/15/19 94.1 kg (207 lb 6.4 oz)   11/13/18 91.6 kg (202 lb)   10/05/18 91.6 kg (202 lb)                  Labs reviewed in EPIC    ROS:  Constitutional, HEENT, cardiovascular, pulmonary, gi and gu systems are negative, except as otherwise noted.    OBJECTIVE:     /88   Pulse 110   Temp 98.8  F (37.1  C) (Tympanic)   Resp 16   Ht 1.702 m (5' 7\")   Wt 94.1 kg (207 lb 6.4 oz)   SpO2 97%   BMI 32.48 kg/m    Body mass index is 32.48 kg/m .  GENERAL: healthy, alert and no distress  RESP: lungs clear to auscultation - no rales, rhonchi or wheezes  CV: regular rate and rhythm, normal S1 S2, no S3 or S4, no murmur, click or rub, no peripheral edema and peripheral pulses strong  NEURO: Normal strength and tone, mentation intact and speech normal  PSYCH: mentation appears normal, affect normal/bright    Diagnostic Test Results:  Pending, patient will schedule lab appointment     ASSESSMENT/PLAN:     1. Hypertension goal BP (blood pressure) < 140/90  -BP borderline normal, patient states sometimes its over 140/90, recommended to increase Norvasc to 7.5 mg and take daily evening, take Prinzide in AM   - " amLODIPine (NORVASC) 5 MG tablet; Take 1.5 tablets (7.5 mg) by mouth daily  Dispense: 135 tablet; Refill: 3  - lisinopril-hydrochlorothiazide (PRINZIDE/ZESTORETIC) 20-25 MG tablet; Take 1 tablet by mouth every morning  Dispense: 90 tablet; Refill: 3  - Basic metabolic panel FUTURE anytime; Future    2. Hyperlipidemia LDL goal <100  - atorvastatin (LIPITOR) 20 MG tablet; Take 1 tablet (20 mg) by mouth daily  Dispense: 90 tablet; Refill: 3  - Lipid panel reflex to direct LDL Fasting; Future    See Patient Instructions    BRET Farrell [facility]

## 2019-04-17 DIAGNOSIS — E78.5 HYPERLIPIDEMIA LDL GOAL <100: ICD-10-CM

## 2019-04-17 DIAGNOSIS — I10 HYPERTENSION GOAL BP (BLOOD PRESSURE) < 140/90: ICD-10-CM

## 2019-04-17 LAB
ANION GAP SERPL CALCULATED.3IONS-SCNC: 4 MMOL/L (ref 3–14)
BUN SERPL-MCNC: 12 MG/DL (ref 7–30)
CALCIUM SERPL-MCNC: 9.1 MG/DL (ref 8.5–10.1)
CHLORIDE SERPL-SCNC: 105 MMOL/L (ref 94–109)
CHOLEST SERPL-MCNC: 193 MG/DL
CO2 SERPL-SCNC: 30 MMOL/L (ref 20–32)
CREAT SERPL-MCNC: 0.91 MG/DL (ref 0.66–1.25)
GFR SERPL CREATININE-BSD FRML MDRD: >90 ML/MIN/{1.73_M2}
GLUCOSE SERPL-MCNC: 90 MG/DL (ref 70–99)
HDLC SERPL-MCNC: 79 MG/DL
LDLC SERPL CALC-MCNC: 86 MG/DL
NONHDLC SERPL-MCNC: 114 MG/DL
POTASSIUM SERPL-SCNC: 3.7 MMOL/L (ref 3.4–5.3)
SODIUM SERPL-SCNC: 139 MMOL/L (ref 133–144)
TRIGL SERPL-MCNC: 138 MG/DL

## 2019-04-17 PROCEDURE — 36415 COLL VENOUS BLD VENIPUNCTURE: CPT | Performed by: NURSE PRACTITIONER

## 2019-04-17 PROCEDURE — 80048 BASIC METABOLIC PNL TOTAL CA: CPT | Performed by: NURSE PRACTITIONER

## 2019-04-17 PROCEDURE — 80061 LIPID PANEL: CPT | Performed by: NURSE PRACTITIONER

## 2020-01-13 ENCOUNTER — OFFICE VISIT (OUTPATIENT)
Dept: FAMILY MEDICINE | Facility: CLINIC | Age: 60
End: 2020-01-13
Payer: COMMERCIAL

## 2020-01-13 VITALS
HEART RATE: 104 BPM | TEMPERATURE: 99 F | OXYGEN SATURATION: 95 % | SYSTOLIC BLOOD PRESSURE: 146 MMHG | DIASTOLIC BLOOD PRESSURE: 92 MMHG | BODY MASS INDEX: 33.21 KG/M2 | WEIGHT: 211.6 LBS | HEIGHT: 67 IN | RESPIRATION RATE: 10 BRPM

## 2020-01-13 DIAGNOSIS — R82.90 NONSPECIFIC FINDING ON EXAMINATION OF URINE: ICD-10-CM

## 2020-01-13 DIAGNOSIS — N41.0 ACUTE PROSTATITIS: Primary | ICD-10-CM

## 2020-01-13 DIAGNOSIS — R10.2 SUPRAPUBIC PAIN: ICD-10-CM

## 2020-01-13 LAB
ALBUMIN UR-MCNC: 30 MG/DL
ANION GAP SERPL CALCULATED.3IONS-SCNC: 4 MMOL/L (ref 3–14)
APPEARANCE UR: ABNORMAL
BACTERIA #/AREA URNS HPF: ABNORMAL /HPF
BASOPHILS # BLD AUTO: 0 10E9/L (ref 0–0.2)
BASOPHILS NFR BLD AUTO: 0.3 %
BILIRUB UR QL STRIP: NEGATIVE
BUN SERPL-MCNC: 18 MG/DL (ref 7–30)
CALCIUM SERPL-MCNC: 9.5 MG/DL (ref 8.5–10.1)
CHLORIDE SERPL-SCNC: 104 MMOL/L (ref 94–109)
CO2 SERPL-SCNC: 33 MMOL/L (ref 20–32)
COLOR UR AUTO: YELLOW
CREAT SERPL-MCNC: 0.92 MG/DL (ref 0.66–1.25)
DIFFERENTIAL METHOD BLD: NORMAL
EOSINOPHIL # BLD AUTO: 0.1 10E9/L (ref 0–0.7)
EOSINOPHIL NFR BLD AUTO: 1.2 %
ERYTHROCYTE [DISTWIDTH] IN BLOOD BY AUTOMATED COUNT: 12.9 % (ref 10–15)
GFR SERPL CREATININE-BSD FRML MDRD: >90 ML/MIN/{1.73_M2}
GLUCOSE SERPL-MCNC: 119 MG/DL (ref 70–99)
GLUCOSE UR STRIP-MCNC: NEGATIVE MG/DL
HCT VFR BLD AUTO: 41.6 % (ref 40–53)
HGB BLD-MCNC: 14.4 G/DL (ref 13.3–17.7)
HGB UR QL STRIP: ABNORMAL
KETONES UR STRIP-MCNC: NEGATIVE MG/DL
LEUKOCYTE ESTERASE UR QL STRIP: ABNORMAL
LYMPHOCYTES # BLD AUTO: 1.2 10E9/L (ref 0.8–5.3)
LYMPHOCYTES NFR BLD AUTO: 16.7 %
MCH RBC QN AUTO: 30.2 PG (ref 26.5–33)
MCHC RBC AUTO-ENTMCNC: 34.6 G/DL (ref 31.5–36.5)
MCV RBC AUTO: 87 FL (ref 78–100)
MONOCYTES # BLD AUTO: 1.2 10E9/L (ref 0–1.3)
MONOCYTES NFR BLD AUTO: 16.9 %
NEUTROPHILS # BLD AUTO: 4.7 10E9/L (ref 1.6–8.3)
NEUTROPHILS NFR BLD AUTO: 64.9 %
NITRATE UR QL: POSITIVE
PH UR STRIP: 6 PH (ref 5–7)
PLATELET # BLD AUTO: 195 10E9/L (ref 150–450)
POTASSIUM SERPL-SCNC: 3.2 MMOL/L (ref 3.4–5.3)
RBC # BLD AUTO: 4.77 10E12/L (ref 4.4–5.9)
RBC #/AREA URNS AUTO: ABNORMAL /HPF
SODIUM SERPL-SCNC: 141 MMOL/L (ref 133–144)
SOURCE: ABNORMAL
SP GR UR STRIP: 1.02 (ref 1–1.03)
UROBILINOGEN UR STRIP-ACNC: 1 EU/DL (ref 0.2–1)
WBC # BLD AUTO: 7.2 10E9/L (ref 4–11)
WBC #/AREA URNS AUTO: >100 /HPF

## 2020-01-13 PROCEDURE — 87086 URINE CULTURE/COLONY COUNT: CPT | Performed by: NURSE PRACTITIONER

## 2020-01-13 PROCEDURE — 87186 SC STD MICRODIL/AGAR DIL: CPT | Performed by: NURSE PRACTITIONER

## 2020-01-13 PROCEDURE — 99214 OFFICE O/P EST MOD 30 MIN: CPT | Performed by: NURSE PRACTITIONER

## 2020-01-13 PROCEDURE — 36415 COLL VENOUS BLD VENIPUNCTURE: CPT | Performed by: NURSE PRACTITIONER

## 2020-01-13 PROCEDURE — 80048 BASIC METABOLIC PNL TOTAL CA: CPT | Performed by: NURSE PRACTITIONER

## 2020-01-13 PROCEDURE — 85025 COMPLETE CBC W/AUTO DIFF WBC: CPT | Performed by: NURSE PRACTITIONER

## 2020-01-13 PROCEDURE — 87088 URINE BACTERIA CULTURE: CPT | Performed by: NURSE PRACTITIONER

## 2020-01-13 PROCEDURE — 81001 URINALYSIS AUTO W/SCOPE: CPT | Performed by: NURSE PRACTITIONER

## 2020-01-13 RX ORDER — SULFAMETHOXAZOLE/TRIMETHOPRIM 800-160 MG
1 TABLET ORAL 2 TIMES DAILY
Qty: 20 TABLET | Refills: 0 | Status: SHIPPED | OUTPATIENT
Start: 2020-01-13 | End: 2020-07-07

## 2020-01-13 RX ORDER — SULFAMETHOXAZOLE/TRIMETHOPRIM 800-160 MG
1 TABLET ORAL 2 TIMES DAILY
Qty: 14 TABLET | Refills: 0 | Status: SHIPPED | OUTPATIENT
Start: 2020-01-13 | End: 2020-01-13

## 2020-01-13 ASSESSMENT — MIFFLIN-ST. JEOR: SCORE: 1743.44

## 2020-01-13 NOTE — PATIENT INSTRUCTIONS
"Take Bactrim twice daily with food. Take a probiotic such as Culturelle or Florastor (recommend lactobacillus 50 billion CFU twice daily  by at least one hour from the antibiotic) while on the antibiotic or eat a Greek yogurt containing \"live active cultures\" daily.    If you are continuing to have fevers, symptoms worsen, go immediately to the ER.    Follow up in one week.  "

## 2020-01-13 NOTE — NURSING NOTE
"Initial BP (!) 146/92 (BP Location: Right arm, Patient Position: Sitting, Cuff Size: Adult Regular)   Pulse 104   Temp 99  F (37.2  C) (Tympanic)   Resp 10   Ht 1.702 m (5' 7\")   Wt 96 kg (211 lb 9.6 oz)   SpO2 95%   BMI 33.14 kg/m   Estimated body mass index is 33.14 kg/m  as calculated from the following:    Height as of this encounter: 1.702 m (5' 7\").    Weight as of this encounter: 96 kg (211 lb 9.6 oz). .      "

## 2020-01-13 NOTE — PROGRESS NOTES
Subjective     Steven Buck is a 59 year old male who presents to clinic today for the following health issues:    HPI   Genitourinary - Male  Onset: Last Wednesday didn't feel good had chills, Thursday woke up  with a fever chills came and gone through Friday Thursday morning urinary symptoms began     Description:   Dysuria (painful urination): no   Hematuria (blood in urine): no   Frequency: YES  Are you urinating at night : YES  Hesitancy (delay in urine): YES  Retention (unable to empty): YES  Decrease in urinary flow: no   Incontinence: YES- dribbles     Progression of Symptoms:  Unknown     Accompanying Signs & Symptoms:  Fever: YES- two days not currently   Back/Flank pain: no   Urethral discharge: no   Testicle lumps/masses/pain: no   Nausea and/or vomiting: no   Abdominal pain: YES- Lower abdominal pain started yesterday about 5 am and is worsening     History:   History of frequent UTI's: no   History of kidney stones: no   History of hernias: no   Personal or Family history of Prostate problems: YES, Maternal Grandfather   Sexually active: YES    Precipitating factors:       Alleviating factors:    Above HPI reviewed. Additionally, on 1/8/2020, developed fevers, difficulty starting flow of urine, 1/9, 1/10 continued to run temp with Tmax 102.  Increase in urinary frequency, mild dysuria.  Did have temp evening of 1/12 at 101.  Yesterday was feeling somewhat better until the evening when he developed suprapubic pain.  Symptoms not associated with testicular pain, diarrhea, nausea, vomiting, rash, URI symptoms.  Does note issues with urinary flow have now resolved.      Patient Active Problem List   Diagnosis     Hyperlipidemia LDL goal <100     Hypertension goal BP (blood pressure) < 140/90     Essential hypertension     Past Surgical History:   Procedure Laterality Date     ORTHOPEDIC SURGERY       REPAIR TENDON BICEPS DISTAL UPPER EXTREMITY  6/27/2014    Procedure: REPAIR TENDON BICEPS DISTAL  "UPPER EXTREMITY;  Surgeon: Ryan Joe MD;  Location: [facility] OR     SURGICAL HISTORY OF -       RIGHT knee arthroscopic surgery     SURGICAL HISTORY OF -   97    vasectomy       Social History     Tobacco Use     Smoking status: Former Smoker     Last attempt to quit: 1986     Years since quittin.0     Smokeless tobacco: Never Used   Substance Use Topics     Alcohol use: Yes     Alcohol/week: 0.0 standard drinks     Comment: a few drinks now and then     Family History   Problem Relation Age of Onset     Hypertension Mother      Hypertension Father      C.A.D. Father      Cardiovascular Father      Hypertension Maternal Grandmother      Hypertension Maternal Grandfather      Hypertension Brother          Current Outpatient Medications   Medication Sig Dispense Refill     amLODIPine (NORVASC) 5 MG tablet Take 1.5 tablets (7.5 mg) by mouth daily 135 tablet 3     atorvastatin (LIPITOR) 20 MG tablet Take 1 tablet (20 mg) by mouth daily 90 tablet 3     lisinopril-hydrochlorothiazide (PRINZIDE/ZESTORETIC) 20-25 MG tablet Take 1 tablet by mouth every morning 90 tablet 3     Multiple Vitamins-Minerals (ONE-A-DAY 50 PLUS PO) Take 1 tablet by mouth daily       Omega-3 Fatty Acids (FISH OIL BURP-LESS PO) Take 1 tablet by mouth every morning       sulfamethoxazole-trimethoprim (BACTRIM DS) 800-160 MG tablet Take 1 tablet by mouth 2 times daily for 10 days 20 tablet 0       Reviewed and updated as needed this visit by Provider  Tobacco  Allergies  Meds  Problems  Med Hx  Surg Hx  Fam Hx         Review of Systems   ROS COMP: Constitutional, HEENT, cardiovascular, pulmonary, gi and gu systems are negative, except as otherwise noted.      Objective    BP (!) 146/92 (BP Location: Right arm, Patient Position: Sitting, Cuff Size: Adult Regular)   Pulse 104   Temp 99  F (37.2  C) (Tympanic)   Resp 10   Ht 1.702 m (5' 7\")   Wt 96 kg (211 lb 9.6 oz)   SpO2 95%   BMI 33.14 kg/m    Body mass index is " 33.14 kg/m .  Physical Exam  Vitals signs and nursing note reviewed.   Constitutional:       Appearance: Normal appearance.   HENT:      Head: Normocephalic and atraumatic.      Mouth/Throat:      Mouth: Mucous membranes are moist.   Eyes:      Comments: Non-icteric   Neck:      Musculoskeletal: Neck supple.   Cardiovascular:      Rate and Rhythm: Normal rate and regular rhythm.      Pulses: Normal pulses.      Heart sounds: Normal heart sounds.   Pulmonary:      Effort: Pulmonary effort is normal.      Breath sounds: Normal breath sounds.   Abdominal:      General: Abdomen is flat. Bowel sounds are normal.      Palpations: Abdomen is soft.      Tenderness: There is abdominal tenderness in the suprapubic area.   Genitourinary:     Prostate: Enlarged and tender (mild).   Neurological:      General: No focal deficit present.      Mental Status: He is alert and oriented to person, place, and time.   Psychiatric:         Mood and Affect: Mood normal.         Behavior: Behavior normal.         Thought Content: Thought content normal.         Judgment: Judgment normal.          Diagnostic Test Results:  Labs reviewed in Epic  Results for orders placed or performed in visit on 01/13/20 (from the past 24 hour(s))   *UA reflex to Microscopic and Culture ([place] and [facility] (except [place] and [place])   Result Value Ref Range    Color Urine Yellow     Appearance Urine Turbid     Glucose Urine Negative NEG^Negative mg/dL    Bilirubin Urine Negative NEG^Negative    Ketones Urine Negative NEG^Negative mg/dL    Specific Gravity Urine 1.020 1.003 - 1.035    Blood Urine Large (A) NEG^Negative    pH Urine 6.0 5.0 - 7.0 pH    Protein Albumin Urine 30 (A) NEG^Negative mg/dL    Urobilinogen Urine 1.0 0.2 - 1.0 EU/dL    Nitrite Urine Positive (A) NEG^Negative    Leukocyte Esterase Urine Large (A) NEG^Negative    Source Midstream Urine    CBC with platelets and differential   Result Value Ref Range    WBC 7.2 4.0 - 11.0  10e9/L    RBC Count 4.77 4.4 - 5.9 10e12/L    Hemoglobin 14.4 13.3 - 17.7 g/dL    Hematocrit 41.6 40.0 - 53.0 %    MCV 87 78 - 100 fl    MCH 30.2 26.5 - 33.0 pg    MCHC 34.6 31.5 - 36.5 g/dL    RDW 12.9 10.0 - 15.0 %    Platelet Count 195 150 - 450 10e9/L    % Neutrophils 64.9 %    % Lymphocytes 16.7 %    % Monocytes 16.9 %    % Eosinophils 1.2 %    % Basophils 0.3 %    Absolute Neutrophil 4.7 1.6 - 8.3 10e9/L    Absolute Lymphocytes 1.2 0.8 - 5.3 10e9/L    Absolute Monocytes 1.2 0.0 - 1.3 10e9/L    Absolute Eosinophils 0.1 0.0 - 0.7 10e9/L    Absolute Basophils 0.0 0.0 - 0.2 10e9/L    Diff Method Automated Method    Basic metabolic panel   Result Value Ref Range    Sodium 141 133 - 144 mmol/L    Potassium 3.2 (L) 3.4 - 5.3 mmol/L    Chloride 104 94 - 109 mmol/L    Carbon Dioxide 33 (H) 20 - 32 mmol/L    Anion Gap 4 3 - 14 mmol/L    Glucose 119 (H) 70 - 99 mg/dL    Urea Nitrogen 18 7 - 30 mg/dL    Creatinine 0.92 0.66 - 1.25 mg/dL    GFR Estimate >90 >60 mL/min/[1.73_m2]    GFR Estimate If Black >90 >60 mL/min/[1.73_m2]    Calcium 9.5 8.5 - 10.1 mg/dL   Urine Microscopic   Result Value Ref Range    WBC Urine >100 (A) OTO5^0 - 5 /HPF    RBC Urine 25-50 (A) OTO2^O - 2 /HPF    Bacteria Urine Moderate (A) NEG^Negative /HPF           Assessment & Plan     1. Acute prostatitis  Most suspicious for prostatitis given history, exam findings and lab testing.  Non toxic in appearance. No significant leukocytosis, evidence of sepsis/SIRS. Vitally stable, at this point do feel he is appropriate for outpatient management however did discuss at length signs and symptoms of worsening illness and reasons that should prompt presentation to the ED. He verbalizes good understanding.  Will have him follow up for recheck next week as abx course may need to be extended.  - *UA reflex to Microscopic and Culture ([place] and [facility] (except [place] and [place])  - CBC with platelets and differential  - Basic metabolic panel  -  "sulfamethoxazole-trimethoprim (BACTRIM DS) 800-160 MG tablet; Take 1 tablet by mouth 2 times daily for 10 days  Dispense: 20 tablet; Refill: 0    2. Suprapubic pain    - Urine Microscopic    3. Nonspecific finding on examination of urine    - Urine Culture Aerobic Bacterial     BMI:   Estimated body mass index is 33.14 kg/m  as calculated from the following:    Height as of this encounter: 1.702 m (5' 7\").    Weight as of this encounter: 96 kg (211 lb 9.6 oz).           Patient Instructions   Take Bactrim twice daily with food. Take a probiotic such as Culturelle or Florastor (recommend lactobacillus 50 billion CFU twice daily  by at least one hour from the antibiotic) while on the antibiotic or eat a Greek yogurt containing \"live active cultures\" daily.    If you are continuing to have fevers, symptoms worsen, go immediately to the ER.    Follow up in one week.      Return in about 1 week (around 1/20/2020) for worsening or continued symptoms.    BRET Ramey [facility]    "

## 2020-01-14 LAB
BACTERIA SPEC CULT: ABNORMAL
Lab: ABNORMAL
SPECIMEN SOURCE: ABNORMAL

## 2020-01-21 ENCOUNTER — OFFICE VISIT (OUTPATIENT)
Dept: FAMILY MEDICINE | Facility: CLINIC | Age: 60
End: 2020-01-21
Payer: COMMERCIAL

## 2020-01-21 VITALS
OXYGEN SATURATION: 96 % | TEMPERATURE: 99.1 F | WEIGHT: 212 LBS | RESPIRATION RATE: 14 BRPM | SYSTOLIC BLOOD PRESSURE: 146 MMHG | DIASTOLIC BLOOD PRESSURE: 88 MMHG | HEIGHT: 67 IN | BODY MASS INDEX: 33.27 KG/M2 | HEART RATE: 100 BPM

## 2020-01-21 DIAGNOSIS — R73.9 ELEVATED BLOOD SUGAR: ICD-10-CM

## 2020-01-21 DIAGNOSIS — I10 HYPERTENSION GOAL BP (BLOOD PRESSURE) < 140/90: ICD-10-CM

## 2020-01-21 DIAGNOSIS — R31.9 HEMATURIA, UNSPECIFIED TYPE: Primary | ICD-10-CM

## 2020-01-21 DIAGNOSIS — E78.5 HYPERLIPIDEMIA LDL GOAL <100: ICD-10-CM

## 2020-01-21 LAB
ALBUMIN UR-MCNC: NEGATIVE MG/DL
APPEARANCE UR: CLEAR
BILIRUB UR QL STRIP: NEGATIVE
COLOR UR AUTO: YELLOW
GLUCOSE UR STRIP-MCNC: NEGATIVE MG/DL
HGB UR QL STRIP: NEGATIVE
KETONES UR STRIP-MCNC: ABNORMAL MG/DL
LEUKOCYTE ESTERASE UR QL STRIP: NEGATIVE
NITRATE UR QL: NEGATIVE
PH UR STRIP: 7 PH (ref 5–7)
SOURCE: ABNORMAL
SP GR UR STRIP: 1.01 (ref 1–1.03)
UROBILINOGEN UR STRIP-ACNC: 0.2 EU/DL (ref 0.2–1)

## 2020-01-21 PROCEDURE — 81003 URINALYSIS AUTO W/O SCOPE: CPT | Performed by: NURSE PRACTITIONER

## 2020-01-21 PROCEDURE — 99214 OFFICE O/P EST MOD 30 MIN: CPT | Performed by: NURSE PRACTITIONER

## 2020-01-21 RX ORDER — AMLODIPINE BESYLATE 10 MG/1
10 TABLET ORAL DAILY
Qty: 90 TABLET | Refills: 3 | Status: SHIPPED | OUTPATIENT
Start: 2020-01-21 | End: 2020-07-07

## 2020-01-21 ASSESSMENT — MIFFLIN-ST. JEOR: SCORE: 1745.26

## 2020-01-21 NOTE — PATIENT INSTRUCTIONS
Increase daily physical activity/exercise 30 minutes most days of the week. See DASH diet booklet for healthy eating tips. Check your blood pressure twice a week (either at home, a pharmacy, or make nurse-only visits to have it done here) for the next month. Follow up in 2 months for a re-check, we may need to start medication at that time.     Goal would be to have your blood pressure < 140/90, call if repeated readings are higher than this as we may need to initiate blood pressure medication at that time.    Follow-up in 2-3 weeks with RN for blood pressure recheck and labs - come fasting (10 hours no eating, can have water and black coffee).    Increased Amlodipine to 10 mg daily - you can 2 X 5 mg from what you have at home.  New prescription sent for Amlodipine 10 mg daily.    Given handout on foods higher in potassium.    MARY Ferraro

## 2020-01-21 NOTE — PROGRESS NOTES
Subjective     Stevne Buck is a 59 year old male who presents to clinic today for the following health issues:    HPI   Prostatitis follow up     Duration: started approx 13 days ago.     Description:  Pt states he is no longer experiencing any symptoms today.       History (frequent UTI's/kidney stones/prostate problems): Patient was seen 01/13/2020 by same day provider and dx with prostatitis.       Therapies tried and outcome: course of antibiotics - pt is currently completing a 10 day course of Bactrim BID.   Outcome: he states that he is no longer having any sx   Has 2 more days of antibiotics.  Today has full resolution of symptoms.    Reports developed urinary symptoms, urinary retention and fevers max 102 for 1-2 days prior to coming into clinic 1/13/2020.  Developed abdominal pain day before he was seen in clinic which is what brought him in.    Pt states that the last couple of years at his [employer] physicals he has been told he has a bit of blood in his urine.    Not a smoker - quit years ago - smoked for 5 years or less.   No family history of bladder cancer, MGF with history of prostate cancer in his 80's.    Hyperlipidemia Follow-Up      Are you regularly taking any medication or supplement to lower your cholesterol?   No    Are you having muscle aches or other side effects that you think could be caused by your cholesterol lowering medication?  No    Hypertension Follow-up      Do you check your blood pressure regularly outside of the clinic? No     Are you following a low salt diet? No    Are your blood pressures ever more than 140 on the top number (systolic) OR more   than 90 on the bottom number (diastolic), for example 140/90? Yes      How many servings of fruits and vegetables do you eat daily?  2-3    On average, how many sweetened beverages do you drink each day (Examples: soda, juice, sweet tea, etc.  Do NOT count diet or artificially sweetened beverages)?   2    How many days per week do  you exercise enough to make your heart beat faster? 3 or less    How many minutes a day do you exercise enough to make your heart beat faster? 9 or less    How many days per week do you miss taking your medication? 0         Patient Active Problem List   Diagnosis     Hyperlipidemia LDL goal <100     Hypertension goal BP (blood pressure) < 140/90     Essential hypertension     Past Surgical History:   Procedure Laterality Date     ORTHOPEDIC SURGERY       REPAIR TENDON BICEPS DISTAL UPPER EXTREMITY  2014    Procedure: REPAIR TENDON BICEPS DISTAL UPPER EXTREMITY;  Surgeon: Ryan Joe MD;  Location: [facility]     SURGICAL HISTORY OF -       RIGHT knee arthroscopic surgery     SURGICAL HISTORY OF -   97    vasectomy       Social History     Tobacco Use     Smoking status: Former Smoker     Last attempt to quit: 1986     Years since quittin.0     Smokeless tobacco: Never Used   Substance Use Topics     Alcohol use: Yes     Alcohol/week: 0.0 standard drinks     Comment: a few drinks now and then     Family History   Problem Relation Age of Onset     Hypertension Mother      Hypertension Father      C.A.D. Father      Cardiovascular Father      Hypertension Maternal Grandmother      Hypertension Maternal Grandfather      Hypertension Brother          Current Outpatient Medications   Medication Sig Dispense Refill     amLODIPine (NORVASC) 5 MG tablet Take 1.5 tablets (7.5 mg) by mouth daily 135 tablet 3     atorvastatin (LIPITOR) 20 MG tablet Take 1 tablet (20 mg) by mouth daily 90 tablet 3     lisinopril-hydrochlorothiazide (PRINZIDE/ZESTORETIC) 20-25 MG tablet Take 1 tablet by mouth every morning 90 tablet 3     Multiple Vitamins-Minerals (ONE-A-DAY 50 PLUS PO) Take 1 tablet by mouth daily       Omega-3 Fatty Acids (FISH OIL BURP-LESS PO) Take 1 tablet by mouth every morning       sulfamethoxazole-trimethoprim (BACTRIM DS) 800-160 MG tablet Take 1 tablet by mouth 2 times daily for 10 days  "20 tablet 0     Allergies   Allergen Reactions     Nkda [No Known Drug Allergies]      Recent Labs   Lab Test 01/13/20  1612 04/17/19  0631 11/13/18  1327 10/05/18  1425  03/28/17  0644 02/26/16  0650  10/14/14  1617  02/18/13  0708   LDL  --  86  --   --   --  86 69   < >  --    < > 194*   HDL  --  79  --   --   --  83 82   < >  --    < > 84   TRIG  --  138  --   --   --  109 133   < >  --    < > 71   ALT  --   --  34 51  --   --   --   --  32   < > 47   CR 0.92 0.91 0.92 0.91   < > 0.86 0.83  --  1.11   < > 1.03   GFRESTIMATED >90 >90 85 87   < > >90  Non  GFR Calc   >90  Non  GFR Calc    --  70   < > 76   GFRESTBLACK >90 >90 >90 >90   < > >90   GFR Calc   >90   GFR Calc    --  84   < > >90   POTASSIUM 3.2* 3.7 3.6 3.7   < > 3.8 3.8  --  4.2   < > 4.5   TSH  --   --   --   --   --   --   --   --   --   --  1.61    < > = values in this interval not displayed.      BP Readings from Last 3 Encounters:   01/21/20 (!) 146/88   01/13/20 (!) 146/92   04/15/19 138/88    Wt Readings from Last 3 Encounters:   01/21/20 96.2 kg (212 lb)   01/13/20 96 kg (211 lb 9.6 oz)   04/15/19 94.1 kg (207 lb 6.4 oz)                    Reviewed and updated as needed this visit by Provider  Med Hx  Fam Hx         Review of Systems   ROS COMP: Constitutional, HEENT, cardiovascular, pulmonary, GI, , musculoskeletal, neuro, skin, endocrine and psych systems are negative, except as otherwise noted.      Objective    BP (!) 146/88 (BP Location: Left arm, Patient Position: Sitting, Cuff Size: Adult Regular)   Pulse 100   Temp 99.1  F (37.3  C) (Tympanic)   Resp 14   Ht 1.702 m (5' 7\")   Wt 96.2 kg (212 lb)   SpO2 96%   BMI 33.20 kg/m    Body mass index is 33.2 kg/m .  Physical Exam   GENERAL: healthy, alert and no distress  NECK: no adenopathy, no asymmetry, masses, or scars and thyroid normal to palpation  RESP: lungs clear to auscultation - no rales, rhonchi or " "wheezes  CV: regular rate and rhythm, normal S1 S2, no S3 or S4, no murmur, click or rub, no peripheral edema and peripheral pulses strong  ABDOMEN: soft, nontender, no hepatosplenomegaly, no masses and bowel sounds normal  MS: no gross musculoskeletal defects noted, no edema    Diagnostic Test Results:  Labs reviewed in Epic  No results found for this or any previous visit (from the past 24 hour(s)).        Assessment & Plan     1. Hematuria  Resolved - most likely related to urinary tract infection   UC showed e coli.  Symptoms resolved.    - *UA reflex to Microscopic and Culture ([place] and [facility] [facility] (except [place] and [place])    2. Hypertension goal BP (blood pressure) < 140/90  Not controlled.  Increased Norvasc to 10 mg - recheck in 2 weeks with RN and recheck BMP.    - amLODIPine (NORVASC) 10 MG tablet; Take 1 tablet (10 mg) by mouth daily  Dispense: 90 tablet; Refill: 3  - Basic metabolic panel; Future  - Hemoglobin A1c; Future    3. Hyperlipidemia LDL goal <100   Lipids today.    - Lipid panel reflex to direct LDL Fasting; Future    4. Elevated blood sugar     - Hemoglobin A1c; Future     BMI:   Estimated body mass index is 33.2 kg/m  as calculated from the following:    Height as of this encounter: 1.702 m (5' 7\").    Weight as of this encounter: 96.2 kg (212 lb).   Weight management plan: Discussed healthy diet and exercise guidelines        Patient Instructions   Increase daily physical activity/exercise 30 minutes most days of the week. See DASH diet booklet for healthy eating tips. Check your blood pressure twice a week (either at home, a pharmacy, or make nurse-only visits to have it done here) for the next month. Follow up in 2 months for a re-check, we may need to start medication at that time.     Goal would be to have your blood pressure < 140/90, call if repeated readings are higher than this as we may need to initiate blood pressure medication at that time.    Follow-up in 2-3 weeks " with RN for blood pressure recheck and labs - come fasting (10 hours no eating, can have water and black coffee).    Increased Amlodipine to 10 mg daily - you can 2 X 5 mg from what you have at home.  New prescription sent for Amlodipine 10 mg daily.    Given handout on foods higher in potassium.    MARY Ferraro          No follow-ups on file.    Lety Barger NP  [facility]

## 2020-02-06 ENCOUNTER — ALLIED HEALTH/NURSE VISIT (OUTPATIENT)
Dept: FAMILY MEDICINE | Facility: CLINIC | Age: 60
End: 2020-02-06
Payer: COMMERCIAL

## 2020-02-06 VITALS — DIASTOLIC BLOOD PRESSURE: 92 MMHG | SYSTOLIC BLOOD PRESSURE: 166 MMHG

## 2020-02-06 DIAGNOSIS — I10 HYPERTENSION GOAL BP (BLOOD PRESSURE) < 140/90: ICD-10-CM

## 2020-02-06 DIAGNOSIS — R73.9 ELEVATED BLOOD SUGAR: ICD-10-CM

## 2020-02-06 DIAGNOSIS — E78.5 HYPERLIPIDEMIA LDL GOAL <100: ICD-10-CM

## 2020-02-06 DIAGNOSIS — I10 HYPERTENSION GOAL BP (BLOOD PRESSURE) < 140/90: Primary | ICD-10-CM

## 2020-02-06 LAB
ANION GAP SERPL CALCULATED.3IONS-SCNC: 7 MMOL/L (ref 3–14)
BUN SERPL-MCNC: 10 MG/DL (ref 7–30)
CALCIUM SERPL-MCNC: 8.8 MG/DL (ref 8.5–10.1)
CHLORIDE SERPL-SCNC: 106 MMOL/L (ref 94–109)
CHOLEST SERPL-MCNC: 188 MG/DL
CO2 SERPL-SCNC: 28 MMOL/L (ref 20–32)
CREAT SERPL-MCNC: 0.8 MG/DL (ref 0.66–1.25)
GFR SERPL CREATININE-BSD FRML MDRD: >90 ML/MIN/{1.73_M2}
GLUCOSE SERPL-MCNC: 90 MG/DL (ref 70–99)
HBA1C MFR BLD: 5.7 % (ref 0–5.6)
HDLC SERPL-MCNC: 75 MG/DL
LDLC SERPL CALC-MCNC: 93 MG/DL
NONHDLC SERPL-MCNC: 113 MG/DL
POTASSIUM SERPL-SCNC: 3.7 MMOL/L (ref 3.4–5.3)
SODIUM SERPL-SCNC: 141 MMOL/L (ref 133–144)
TRIGL SERPL-MCNC: 101 MG/DL

## 2020-02-06 PROCEDURE — 80061 LIPID PANEL: CPT | Performed by: NURSE PRACTITIONER

## 2020-02-06 PROCEDURE — 36415 COLL VENOUS BLD VENIPUNCTURE: CPT | Performed by: NURSE PRACTITIONER

## 2020-02-06 PROCEDURE — 83036 HEMOGLOBIN GLYCOSYLATED A1C: CPT | Performed by: NURSE PRACTITIONER

## 2020-02-06 PROCEDURE — 99207 ZZC NO CHARGE NURSE ONLY: CPT

## 2020-02-06 PROCEDURE — 80048 BASIC METABOLIC PNL TOTAL CA: CPT | Performed by: NURSE PRACTITIONER

## 2020-02-06 NOTE — PROGRESS NOTES
Chief Complaint   Patient presents with     Hypertension     Blood Pressure Check.     Patient walked in for a blood Pressure check this morning around 7:00am after having blood work. Patient took 10mg of norvasc and lisinopril around 06:30am. Patient right away seemed frustrated and became upset because he had to be to work. I took pts first BP which was elevated at 166/92. I asked patient to sit for another 5 minutes and he refused and walked out. Huddled with Andreea Manning RN shortly after. I told patient we would give him a call with recommendations. Please advise next steps for patient.

## 2020-02-06 NOTE — LETTER
February 6, 2020      Stevendenver Buck  [address] [place] [address]        Dear ,    We are writing to inform you of your test results.    All of your labs are unchanged/normal.   Cholesterol looks great, as well as kidney function.     Resulted Orders   Lipid panel reflex to direct LDL Fasting   Result Value Ref Range    Cholesterol 188 <200 mg/dL    Triglycerides 101 <150 mg/dL    HDL Cholesterol 75 >39 mg/dL    LDL Cholesterol Calculated 93 <100 mg/dL      Comment:      Desirable:       <100 mg/dl    Non HDL Cholesterol 113 <130 mg/dL   Hemoglobin A1c   Result Value Ref Range    Hemoglobin A1C 5.7 (H) 0 - 5.6 %      Comment:      Normal <5.7% Prediabetes 5.7-6.4%  Diabetes 6.5% or higher - adopted from ADA   consensus guidelines.     Basic metabolic panel   Result Value Ref Range    Sodium 141 133 - 144 mmol/L    Potassium 3.7 3.4 - 5.3 mmol/L    Chloride 106 94 - 109 mmol/L    Carbon Dioxide 28 20 - 32 mmol/L    Anion Gap 7 3 - 14 mmol/L    Glucose 90 70 - 99 mg/dL    Urea Nitrogen 10 7 - 30 mg/dL    Creatinine 0.80 0.66 - 1.25 mg/dL    GFR Estimate >90 >60 mL/min/[1.73_m2]      Comment:      Non  GFR Calc  Starting 12/18/2018, serum creatinine based estimated GFR (eGFR) will be   calculated using the Chronic Kidney Disease Epidemiology Collaboration   (CKD-EPI) equation.      GFR Estimate If Black >90 >60 mL/min/[1.73_m2]      Comment:       GFR Calc  Starting 12/18/2018, serum creatinine based estimated GFR (eGFR) will be   calculated using the Chronic Kidney Disease Epidemiology Collaboration   (CKD-EPI) equation.      Calcium 8.8 8.5 - 10.1 mg/dL       If you have any questions or concerns, please call the clinic at the number listed above.       Sincerely,      Lety Barger NP

## 2020-02-06 NOTE — PROGRESS NOTES
Ask patient to check out in the community over the next month or so.    Should Return to clinic with me or RN to recheck blood pressure - schedule this with him to avoid waiting.    MARY Ferraro

## 2020-02-06 NOTE — RESULT ENCOUNTER NOTE
All of your labs are unchanged/normal.    Cholesterol looks great, as well as kidney function.  Please notify patient of results.  MARY Ferraro

## 2020-02-06 NOTE — Clinical Note
Patient walked in for a blood Pressure check this morning around 7:00am after having blood work. Patient took 10mg of norvasc and lisinopril around 06:30am. Patient right away seemed frustrated and became upset because he had to be to work. I took pts first BP which was elevated at 166/92. I asked patient to sit for another 5 minutes and he refused and walked out. Huddled with Andreea Manning RN shortly after. I told patient we would give him a call with recommendations. Please advise next steps for patient.  LAD cardiac stent

## 2020-02-10 NOTE — PROGRESS NOTES
3rd Attempted to contact patient at [phone], not at home today,  left message with wife for patient to call back to clinic.

## 2020-07-07 ENCOUNTER — OFFICE VISIT (OUTPATIENT)
Dept: FAMILY MEDICINE | Facility: CLINIC | Age: 60
End: 2020-07-07
Payer: COMMERCIAL

## 2020-07-07 VITALS
TEMPERATURE: 98.6 F | DIASTOLIC BLOOD PRESSURE: 86 MMHG | WEIGHT: 210.9 LBS | HEIGHT: 67 IN | HEART RATE: 88 BPM | SYSTOLIC BLOOD PRESSURE: 138 MMHG | BODY MASS INDEX: 33.1 KG/M2 | OXYGEN SATURATION: 96 % | RESPIRATION RATE: 12 BRPM

## 2020-07-07 DIAGNOSIS — E78.5 HYPERLIPIDEMIA LDL GOAL <100: ICD-10-CM

## 2020-07-07 DIAGNOSIS — I10 HYPERTENSION GOAL BP (BLOOD PRESSURE) < 140/90: ICD-10-CM

## 2020-07-07 DIAGNOSIS — Z12.5 SPECIAL SCREENING FOR MALIGNANT NEOPLASM OF PROSTATE: ICD-10-CM

## 2020-07-07 DIAGNOSIS — Z00.00 ROUTINE GENERAL MEDICAL EXAMINATION AT A HEALTH CARE FACILITY: Primary | ICD-10-CM

## 2020-07-07 PROCEDURE — 99396 PREV VISIT EST AGE 40-64: CPT | Mod: 25 | Performed by: NURSE PRACTITIONER

## 2020-07-07 PROCEDURE — 90471 IMMUNIZATION ADMIN: CPT | Performed by: NURSE PRACTITIONER

## 2020-07-07 PROCEDURE — 99213 OFFICE O/P EST LOW 20 MIN: CPT | Mod: 25 | Performed by: NURSE PRACTITIONER

## 2020-07-07 PROCEDURE — 90750 HZV VACC RECOMBINANT IM: CPT | Performed by: NURSE PRACTITIONER

## 2020-07-07 RX ORDER — AMLODIPINE BESYLATE 10 MG/1
10 TABLET ORAL DAILY
Qty: 90 TABLET | Refills: 3 | Status: SHIPPED | OUTPATIENT
Start: 2020-07-07 | End: 2021-07-29

## 2020-07-07 RX ORDER — LISINOPRIL AND HYDROCHLOROTHIAZIDE 20; 25 MG/1; MG/1
TABLET ORAL
Qty: 30 TABLET | Refills: 0 | Status: SHIPPED | OUTPATIENT
Start: 2020-07-07 | End: 2020-07-07

## 2020-07-07 RX ORDER — ATORVASTATIN CALCIUM 20 MG/1
20 TABLET, FILM COATED ORAL DAILY
Qty: 90 TABLET | Refills: 3 | Status: SHIPPED | OUTPATIENT
Start: 2020-07-07 | End: 2021-07-06

## 2020-07-07 RX ORDER — LISINOPRIL AND HYDROCHLOROTHIAZIDE 20; 25 MG/1; MG/1
TABLET ORAL
Qty: 90 TABLET | Refills: 3 | Status: SHIPPED | OUTPATIENT
Start: 2020-07-07 | End: 2021-07-14

## 2020-07-07 ASSESSMENT — MIFFLIN-ST. JEOR: SCORE: 1740.27

## 2020-07-07 NOTE — NURSING NOTE
Prior to immunization administration, verified patients identity using patient s name and date of birth. Please see Immunization Activity for additional information.     Screening Questionnaire for Adult Immunization    Are you sick today?   No   Do you have allergies to medications, food, a vaccine component or latex?   No   Have you ever had a serious reaction after receiving a vaccination?   No   Do you have a long-term health problem with heart, lung, kidney, or metabolic disease (e.g., diabetes), asthma, a blood disorder, no spleen, complement component deficiency, a cochlear implant, or a spinal fluid leak?  Are you on long-term aspirin therapy?   No   Do you have cancer, leukemia, HIV/AIDS, or any other immune system problem?   No   Do you have a parent, brother, or sister with an immune system problem?   No   In the past 3 months, have you taken medications that affect  your immune system, such as prednisone, other steroids, or anticancer drugs; drugs for the treatment of rheumatoid arthritis, Crohn s disease, or psoriasis; or have you had radiation treatments?   No   Have you had a seizure, or a brain or other nervous system problem?   No   During the past year, have you received a transfusion of blood or blood    products, or been given immune (gamma) globulin or antiviral drug?   No   For women: Are you pregnant or is there a chance you could become       pregnant during the next month?   No   Have you received any vaccinations in the past 4 weeks?   No     Immunization questionnaire answers were all negative.        Per orders of EDER Barger ANA PAULA, injection of Shingrix given by Simran Caballero. Patient instructed to remain in clinic for 15 minutes afterwards, and to report any adverse reaction to me immediately.       Screening performed by Simran Caballero on 7/7/2020 at 3:47 PM.

## 2020-07-07 NOTE — PROGRESS NOTES
SUBJECTIVE:   CC: Steven Buck is an 59 year old male who presents for preventative health visit.     Healthy Habits:    Getting at least 3 servings of Calcium per day:  Yes    Bi-annual eye exam:  Yes    Dental care twice a year:  Yes    Sleep apnea or symptoms of sleep apnea:  None    Diet:  Regular (no restrictions)    Frequency of exercise::  for [employer]. otherwise no other exercise.    Duration of exercise:  N/A    Taking medications regularly:  Yes    Barriers to taking medications:  None    Medication side effects:  None    PHQ-2 Total Score:    Additional concerns today:  Yes (Needs HTN check)    Ability to successfully perform activities of daily living: Yes, no assistance needed  Home safety:  none identified          Hypertension Follow-up      Do you check your blood pressure regularly outside of the clinic? No     Are you following a low salt diet? No    Are your blood pressures ever more than 140 on the top number (systolic) OR more   than 90 on the bottom number (diastolic), for example 140/90? Yes   Pt needs his lisinopril filled - [facility] however only has 2 pills left and would like one refill sent to [facility] pharmacy and the others sent to [facility].         Today's PHQ-2 Score:   PHQ-2 (  [employer]) 4/15/2019   Q1: Little interest or pleasure in doing things 0   Q2: Feeling down, depressed or hopeless 0   PHQ-2 Score 0       Abuse: Current or Past(Physical, Sexual or Emotional)- No  Do you feel safe in your environment? Yes    Have you ever done Advance Care Planning? (For example, a Health Directive, POLST, or a discussion with a medical provider or your loved ones about your wishes): Pt has a will     Social History     Tobacco Use     Smoking status: Former Smoker     Last attempt to quit: 1986     Years since quittin.5     Smokeless tobacco: Never Used   Substance Use Topics     Alcohol use: Yes     Alcohol/week: 0.0 standard drinks      Comment: a few drinks now and then      If you drink alcohol do you typically have >3 drinks per day or >7 drinks per week? No     No flowsheet data found.    Last PSA:   PSA   Date Value Ref Range Status   2013 1.09 0 - 4 ug/L Final       Reviewed orders with patient. Reviewed health maintenance and updated orders accordingly - Yes  Lab work is in process  Labs reviewed in EPIC  BP Readings from Last 3 Encounters:   20 138/86   20 (!) 166/92   20 (!) 146/88    Wt Readings from Last 3 Encounters:   20 95.7 kg (210 lb 14.4 oz)   20 96.2 kg (212 lb)   20 96 kg (211 lb 9.6 oz)                  Patient Active Problem List   Diagnosis     Hyperlipidemia LDL goal <100     Hypertension goal BP (blood pressure) < 140/90     Essential hypertension     Past Surgical History:   Procedure Laterality Date     ORTHOPEDIC SURGERY       REPAIR TENDON BICEPS DISTAL UPPER EXTREMITY  2014    Procedure: REPAIR TENDON BICEPS DISTAL UPPER EXTREMITY;  Surgeon: Ryan Joe MD;  Location: [facility]     SURGICAL HISTORY OF -       RIGHT knee arthroscopic surgery     SURGICAL HISTORY OF -   97    vasectomy       Social History     Tobacco Use     Smoking status: Former Smoker     Last attempt to quit: 1986     Years since quittin.5     Smokeless tobacco: Never Used   Substance Use Topics     Alcohol use: Yes     Alcohol/week: 0.0 standard drinks     Comment: a few drinks now and then     Family History   Problem Relation Age of Onset     Hypertension Mother      Hypertension Father      C.A.D. Father      Cardiovascular Father      Hypertension Maternal Grandmother      Hypertension Maternal Grandfather      Hypertension Brother          Current Outpatient Medications   Medication Sig Dispense Refill     amLODIPine (NORVASC) 10 MG tablet Take 1 tablet (10 mg) by mouth daily 90 tablet 3     atorvastatin (LIPITOR) 20 MG tablet Take 1 tablet (20 mg) by mouth daily 90 tablet  3     lisinopril-hydrochlorothiazide (ZESTORETIC) 20-25 MG tablet TAKE 1 TABLET EVERY MORNING 90 tablet 3     Multiple Vitamins-Minerals (ONE-A-DAY 50 PLUS PO) Take 1 tablet by mouth daily       Omega-3 Fatty Acids (FISH OIL BURP-LESS PO) Take 1 tablet by mouth every morning       Allergies   Allergen Reactions     Nkda [No Known Drug Allergies]      Recent Labs   Lab Test 02/06/20  0657 01/13/20  1612 04/17/19  0631 11/13/18  1327 10/05/18  1425  03/28/17  0644  10/14/14  1617  02/18/13  0708   A1C 5.7*  --   --   --   --   --   --   --   --   --   --    LDL 93  --  86  --   --   --  86   < >  --    < > 194*   HDL 75  --  79  --   --   --  83   < >  --    < > 84   TRIG 101  --  138  --   --   --  109   < >  --    < > 71   ALT  --   --   --  34 51  --   --   --  32   < > 47   CR 0.80 0.92 0.91 0.92 0.91   < > 0.86   < > 1.11   < > 1.03   GFRESTIMATED >90 >90 >90 85 87   < > >90  Non  GFR Calc     < > 70   < > 76   GFRESTBLACK >90 >90 >90 >90 >90   < > >90  African American GFR Calc     < > 84   < > >90   POTASSIUM 3.7 3.2* 3.7 3.6 3.7   < > 3.8   < > 4.2   < > 4.5   TSH  --   --   --   --   --   --   --   --   --   --  1.61    < > = values in this interval not displayed.        Reviewed and updated as needed this visit by clinical staff         Reviewed and updated as needed this visit by Provider          Past Medical History:   Diagnosis Date     Hypertension       Past Surgical History:   Procedure Laterality Date     ORTHOPEDIC SURGERY       REPAIR TENDON BICEPS DISTAL UPPER EXTREMITY  6/27/2014    Procedure: REPAIR TENDON BICEPS DISTAL UPPER EXTREMITY;  Surgeon: Ryan Joe MD;  Location: [facility]     SURGICAL HISTORY OF -   1995    RIGHT knee arthroscopic surgery     SURGICAL HISTORY OF -   1/16/97    vasectomy     OB History   No obstetric history on file.       Review of Systems  CONSTITUTIONAL: NEGATIVE for fever, chills, change in weight  INTEGUMENTARY/SKIN: NEGATIVE for worrisome  "rashes, moles or lesions  EYES: NEGATIVE for vision changes or irritation  ENT: NEGATIVE for ear, mouth and throat problems  RESP: NEGATIVE for significant cough or SOB  CV: NEGATIVE for chest pain, palpitations or peripheral edema  GI: NEGATIVE for nausea, abdominal pain, heartburn, or change in bowel habits   male: negative for dysuria, hematuria, decreased urinary stream, erectile dysfunction, urethral discharge  MUSCULOSKELETAL: NEGATIVE for significant arthralgias or myalgia  NEURO: NEGATIVE for weakness, dizziness or paresthesias  PSYCHIATRIC: NEGATIVE for changes in mood or affect    OBJECTIVE:   Pulse 112   Temp 98.6  F (37  C) (Tympanic)   Resp 12   Ht 1.702 m (5' 7\")   Wt 95.7 kg (210 lb 14.4 oz)   SpO2 96%   BMI 33.03 kg/m      Physical Exam  GENERAL: healthy, alert and no distress  EYES: Eyes grossly normal to inspection, PERRL and conjunctivae and sclerae normal  HENT: ear canals and TM's normal, nose and mouth without ulcers or lesions  NECK: no adenopathy, no asymmetry, masses, or scars and thyroid normal to palpation  RESP: lungs clear to auscultation - no rales, rhonchi or wheezes  CV: regular rate and rhythm, normal S1 S2, no S3 or S4, no murmur, click or rub, no peripheral edema and peripheral pulses strong  ABDOMEN: soft, nontender, no hepatosplenomegaly, no masses and bowel sounds normal  MS: no gross musculoskeletal defects noted, no edema  SKIN: no suspicious lesions or rashes  NEURO: Normal strength and tone, mentation intact and speech normal  PSYCH: mentation appears normal, affect normal/bright    Diagnostic Test Results:  Labs reviewed in Epic    ASSESSMENT/PLAN:   1. Routine general medical examination at a health care facility       2. Hypertension goal BP (blood pressure) < 140/90  Controlled.    - lisinopril-hydrochlorothiazide (ZESTORETIC) 20-25 MG tablet; TAKE 1 TABLET EVERY MORNING  Dispense: 90 tablet; Refill: 3  - amLODIPine (NORVASC) 10 MG tablet; Take 1 tablet (10 mg) " "by mouth daily  Dispense: 90 tablet; Refill: 3  - Basic metabolic panel; Future  - OFFICE/OUTPT VISIT,EST,LEVL III    3. Hyperlipidemia LDL goal <100   Continue statin.  Recheck Lipids.    - atorvastatin (LIPITOR) 20 MG tablet; Take 1 tablet (20 mg) by mouth daily  Dispense: 90 tablet; Refill: 3  - Lipid panel reflex to direct LDL Fasting; Future  - OFFICE/OUTPT VISIT,EST,LEVL III    4. Special screening for malignant neoplasm of prostate  PSA future - check with labs   - PSA, screen; Future  - OFFICE/OUTPT VISIT,EST,LEVL III    COUNSELING:   Reviewed preventive health counseling, as reflected in patient instructions       Regular exercise       Healthy diet/nutrition       Colon cancer screening    Estimated body mass index is 33.2 kg/m  as calculated from the following:    Height as of 1/21/20: 1.702 m (5' 7\").    Weight as of 1/21/20: 96.2 kg (212 lb).          reports that he quit smoking about 34 years ago. He has never used smokeless tobacco.      Counseling Resources:  ATP IV Guidelines  Pooled Cohorts Equation Calculator  FRAX Risk Assessment  ICSI Preventive Guidelines  Dietary Guidelines for Americans, 2010  [facility] MyPlate  ASA Prophylaxis  Lung CA Screening    Lety Barger, NOLAN  [facility]  "

## 2020-09-14 ENCOUNTER — ALLIED HEALTH/NURSE VISIT (OUTPATIENT)
Dept: FAMILY MEDICINE | Facility: CLINIC | Age: 60
End: 2020-09-14
Payer: COMMERCIAL

## 2020-09-14 DIAGNOSIS — Z23 NEED FOR VACCINATION: ICD-10-CM

## 2020-09-14 DIAGNOSIS — Z23 NEED FOR PROPHYLACTIC VACCINATION AND INOCULATION AGAINST INFLUENZA: Primary | ICD-10-CM

## 2020-09-14 PROCEDURE — 90750 HZV VACC RECOMBINANT IM: CPT

## 2020-09-14 PROCEDURE — 90471 IMMUNIZATION ADMIN: CPT

## 2020-09-14 PROCEDURE — 99207 ZZC NO CHARGE NURSE ONLY: CPT

## 2020-09-14 PROCEDURE — 90472 IMMUNIZATION ADMIN EACH ADD: CPT

## 2020-09-14 PROCEDURE — 90682 RIV4 VACC RECOMBINANT DNA IM: CPT

## 2020-09-14 NOTE — NURSING NOTE
Chief Complaint   Patient presents with     Imm/Inj     Flu Shot, Shingrix #2 , patient stated shingrix is covered by insurance in clinic.       Prior to immunization administration, verified patients identity using patient s name and date of birth. Please see Immunization Activity for additional information.     Screening Questionnaire for Adult Immunization    Are you sick today?   No   Do you have allergies to medications, food, a vaccine component or latex?   No   Have you ever had a serious reaction after receiving a vaccination?   No   Do you have a long-term health problem with heart, lung, kidney, or metabolic disease (e.g., diabetes), asthma, a blood disorder, no spleen, complement component deficiency, a cochlear implant, or a spinal fluid leak?  Are you on long-term aspirin therapy?   No   Do you have cancer, leukemia, HIV/AIDS, or any other immune system problem?   No   Do you have a parent, brother, or sister with an immune system problem?   No   In the past 3 months, have you taken medications that affect  your immune system, such as prednisone, other steroids, or anticancer drugs; drugs for the treatment of rheumatoid arthritis, Crohn s disease, or psoriasis; or have you had radiation treatments?   No   Have you had a seizure, or a brain or other nervous system problem?   No   During the past year, have you received a transfusion of blood or blood    products, or been given immune (gamma) globulin or antiviral drug?   No   For women: Are you pregnant or is there a chance you could become       pregnant during the next month?   No   Have you received any vaccinations in the past 4 weeks?   No     Immunization questionnaire answers were all negative.        Patient instructed to remain in clinic for 15 minutes afterwards, and to report any adverse reaction to me immediately.       Screening performed by Juanito Velasquez CMA on 9/14/2020 at 3:37 PM.

## 2020-10-15 ENCOUNTER — OFFICE VISIT (OUTPATIENT)
Dept: FAMILY MEDICINE | Facility: CLINIC | Age: 60
End: 2020-10-15
Payer: COMMERCIAL

## 2020-10-15 VITALS
BODY MASS INDEX: 33.67 KG/M2 | HEART RATE: 86 BPM | RESPIRATION RATE: 11 BRPM | WEIGHT: 215 LBS | SYSTOLIC BLOOD PRESSURE: 138 MMHG | DIASTOLIC BLOOD PRESSURE: 86 MMHG | OXYGEN SATURATION: 96 % | TEMPERATURE: 99 F

## 2020-10-15 DIAGNOSIS — R10.33 PERIUMBILICAL PAIN: Primary | ICD-10-CM

## 2020-10-15 PROCEDURE — 99214 OFFICE O/P EST MOD 30 MIN: CPT | Performed by: NURSE PRACTITIONER

## 2020-10-15 NOTE — PROGRESS NOTES
Subjective     Steven Buck is a 60 year old male who presents to clinic today for the following health issues:    HPI         Abdominal  Onset/Duration: one day  Description:   Character: tender to the touch  Location: umbilical area  Radiation: None  Intensity: mild to moderate, feeling better than yesterday  Progression of Symptoms:  improving  Accompanying Signs & Symptoms:  Fever/Chills: no  Gas/Bloating: no  Nausea: no  Vomitting: no  Diarrhea: no  Constipation: no  Dysuria or Hematuria: no  History:   Trauma: did fall from a ladder a week ago, but landed on his back  Previous similar pain: no  Previous tests done: none  Precipitating factors:   Does the pain change with:     Food: no    Bowel Movement: no    Urination: no   Other factors:  Can feel when bending over or coughing  Therapies tried and outcome: None    Above HPI reviewed. Additionally, pain at left side of umbilicus. No diffuse abdominal pain, fever, chills, constipation, diarrhea, hematochezia, or melena. Recently moved, lot of heavy lifting but that was several weeks ago.    Past Medical History:   Diagnosis Date     Hypertension      Current Outpatient Medications   Medication     amLODIPine (NORVASC) 10 MG tablet     atorvastatin (LIPITOR) 20 MG tablet     lisinopril-hydrochlorothiazide (ZESTORETIC) 20-25 MG tablet     Multiple Vitamins-Minerals (ONE-A-DAY 50 PLUS PO)     Omega-3 Fatty Acids (FISH OIL BURP-LESS PO)     No current facility-administered medications for this visit.      Past Surgical History:   Procedure Laterality Date     ORTHOPEDIC SURGERY       REPAIR TENDON BICEPS DISTAL UPPER EXTREMITY  6/27/2014    Procedure: REPAIR TENDON BICEPS DISTAL UPPER EXTREMITY;  Surgeon: Ryan Joe MD;  Location: [facility]     SURGICAL HISTORY OF -   1995    RIGHT knee arthroscopic surgery     SURGICAL HISTORY OF -   1/16/97    vasectomy           Review of Systems   Constitutional, HEENT, cardiovascular, pulmonary, gi and gu  systems are negative, except as otherwise noted.      Objective    /86   Pulse 86   Temp 99  F (37.2  C) (Tympanic)   Resp 11   Wt 97.5 kg (215 lb)   SpO2 96%   BMI 33.67 kg/m    Body mass index is 33.67 kg/m .  Physical Exam  Vitals signs and nursing note reviewed.   Constitutional:       Appearance: Normal appearance.   HENT:      Head: Normocephalic and atraumatic.      Mouth/Throat:      Mouth: Mucous membranes are moist.   Neck:      Musculoskeletal: Neck supple.   Cardiovascular:      Rate and Rhythm: Normal rate.   Pulmonary:      Effort: Pulmonary effort is normal.   Abdominal:      Palpations: Abdomen is soft.      Tenderness: There is abdominal tenderness (periumbilical, point tender with associated 1cm palpable mass).   Skin:     General: Skin is warm and dry.      Capillary Refill: Capillary refill takes less than 2 seconds.   Neurological:      General: No focal deficit present.      Mental Status: He is alert.   Psychiatric:         Mood and Affect: Mood normal.         Behavior: Behavior normal.                    Assessment & Plan     Periumbilical pain  Suspect this is a small hernia however I cannot reduce, no red flags. Will get CT, refer to surgery if indicated.  - CT Abdomen Pelvis w Contrast; Future        Patient Instructions   Schedule CT. I will call with results.      Return in about 1 week (around 10/22/2020) for worsening or continued symptoms.    BRET Ramey [facility]

## 2020-10-16 ENCOUNTER — HOSPITAL ENCOUNTER (OUTPATIENT)
Dept: CT IMAGING | Facility: CLINIC | Age: 60
Discharge: HOME | End: 2020-10-16
Attending: NURSE PRACTITIONER | Admitting: NURSE PRACTITIONER
Payer: COMMERCIAL

## 2020-10-16 DIAGNOSIS — R10.33 PERIUMBILICAL PAIN: ICD-10-CM

## 2020-10-16 PROCEDURE — 250N000009 HC RX 250: Performed by: RADIOLOGY

## 2020-10-16 PROCEDURE — 74177 CT ABD & PELVIS W/CONTRAST: CPT

## 2020-10-16 PROCEDURE — 250N000011 HC RX IP 250 OP 636: Performed by: RADIOLOGY

## 2020-10-16 RX ORDER — IOPAMIDOL 755 MG/ML
100 INJECTION, SOLUTION INTRAVASCULAR ONCE
Status: COMPLETED | OUTPATIENT
Start: 2020-10-16 | End: 2020-10-16

## 2020-10-16 RX ADMIN — SODIUM CHLORIDE 66 ML: 9 INJECTION, SOLUTION INTRAVENOUS at 16:21

## 2020-10-16 RX ADMIN — IOPAMIDOL 100 ML: 755 INJECTION, SOLUTION INTRAVENOUS at 16:21

## 2021-02-09 DIAGNOSIS — E78.5 HYPERLIPIDEMIA LDL GOAL <100: ICD-10-CM

## 2021-02-09 DIAGNOSIS — I10 HYPERTENSION GOAL BP (BLOOD PRESSURE) < 140/90: ICD-10-CM

## 2021-02-09 DIAGNOSIS — Z12.5 SPECIAL SCREENING FOR MALIGNANT NEOPLASM OF PROSTATE: ICD-10-CM

## 2021-02-09 LAB
ANION GAP SERPL CALCULATED.3IONS-SCNC: 3 MMOL/L (ref 3–14)
BUN SERPL-MCNC: 16 MG/DL (ref 7–30)
CALCIUM SERPL-MCNC: 9.2 MG/DL (ref 8.5–10.1)
CHLORIDE SERPL-SCNC: 104 MMOL/L (ref 94–109)
CHOLEST SERPL-MCNC: 187 MG/DL
CO2 SERPL-SCNC: 31 MMOL/L (ref 20–32)
CREAT SERPL-MCNC: 0.9 MG/DL (ref 0.66–1.25)
GFR SERPL CREATININE-BSD FRML MDRD: >90 ML/MIN/{1.73_M2}
GLUCOSE SERPL-MCNC: 97 MG/DL (ref 70–99)
HDLC SERPL-MCNC: 91 MG/DL
LDLC SERPL CALC-MCNC: 81 MG/DL
NONHDLC SERPL-MCNC: 96 MG/DL
POTASSIUM SERPL-SCNC: 3.7 MMOL/L (ref 3.4–5.3)
PSA SERPL-ACNC: 0.87 UG/L (ref 0–4)
SODIUM SERPL-SCNC: 138 MMOL/L (ref 133–144)
TRIGL SERPL-MCNC: 75 MG/DL

## 2021-02-09 PROCEDURE — 80061 LIPID PANEL: CPT | Performed by: NURSE PRACTITIONER

## 2021-02-09 PROCEDURE — 36415 COLL VENOUS BLD VENIPUNCTURE: CPT | Performed by: NURSE PRACTITIONER

## 2021-02-09 PROCEDURE — G0103 PSA SCREENING: HCPCS | Performed by: NURSE PRACTITIONER

## 2021-02-09 PROCEDURE — 80048 BASIC METABOLIC PNL TOTAL CA: CPT | Performed by: NURSE PRACTITIONER

## 2021-02-09 NOTE — LETTER
February 9, 2021      Stevendenver Buck  [address] [place] [address]        Dear ,    We are writing to inform you of your test results.  All of your lab results are normal.   PSA screen is normal and unchanged.     Resulted Orders   PSA, screen   Result Value Ref Range    PSA 0.87 0 - 4 ug/L      Comment:      Assay Method:  Chemiluminescence using Siemens Vista analyzer   Basic metabolic panel   Result Value Ref Range    Sodium 138 133 - 144 mmol/L    Potassium 3.7 3.4 - 5.3 mmol/L    Chloride 104 94 - 109 mmol/L    Carbon Dioxide 31 20 - 32 mmol/L    Anion Gap 3 3 - 14 mmol/L    Glucose 97 70 - 99 mg/dL      Comment:      Fasting specimen    Urea Nitrogen 16 7 - 30 mg/dL    Creatinine 0.90 0.66 - 1.25 mg/dL    GFR Estimate >90 >60 mL/min/[1.73_m2]      Comment:      Non  GFR Calc  Starting 12/18/2018, serum creatinine based estimated GFR (eGFR) will be   calculated using the Chronic Kidney Disease Epidemiology Collaboration   (CKD-EPI) equation.      GFR Estimate If Black >90 >60 mL/min/[1.73_m2]      Comment:       GFR Calc  Starting 12/18/2018, serum creatinine based estimated GFR (eGFR) will be   calculated using the Chronic Kidney Disease Epidemiology Collaboration   (CKD-EPI) equation.      Calcium 9.2 8.5 - 10.1 mg/dL   Lipid panel reflex to direct LDL Fasting   Result Value Ref Range    Cholesterol 187 <200 mg/dL    Triglycerides 75 <150 mg/dL      Comment:      Fasting specimen    HDL Cholesterol 91 >39 mg/dL    LDL Cholesterol Calculated 81 <100 mg/dL      Comment:      Desirable:       <100 mg/dl    Non HDL Cholesterol 96 <130 mg/dL       If you have any questions or concerns, please call the clinic at the number listed above.       Sincerely,      Lety Barger NP

## 2021-02-09 NOTE — RESULT ENCOUNTER NOTE
All of your lab results are normal.  PSA screen is normal and unchanged.    Please notify patient of results.  MARY Ferraro

## 2021-07-04 DIAGNOSIS — E78.5 HYPERLIPIDEMIA LDL GOAL <100: ICD-10-CM

## 2021-07-06 RX ORDER — ATORVASTATIN CALCIUM 20 MG/1
TABLET, FILM COATED ORAL
Qty: 90 TABLET | Refills: 1 | Status: SHIPPED | OUTPATIENT
Start: 2021-07-06 | End: 2021-11-04

## 2021-07-11 DIAGNOSIS — I10 HYPERTENSION GOAL BP (BLOOD PRESSURE) < 140/90: ICD-10-CM

## 2021-07-14 RX ORDER — LISINOPRIL AND HYDROCHLOROTHIAZIDE 20; 25 MG/1; MG/1
TABLET ORAL
Qty: 90 TABLET | Refills: 0 | Status: SHIPPED | OUTPATIENT
Start: 2021-07-14 | End: 2021-10-12

## 2021-07-28 DIAGNOSIS — I10 HYPERTENSION GOAL BP (BLOOD PRESSURE) < 140/90: ICD-10-CM

## 2021-07-29 RX ORDER — AMLODIPINE BESYLATE 10 MG/1
TABLET ORAL
Qty: 90 TABLET | Refills: 0 | Status: SHIPPED | OUTPATIENT
Start: 2021-07-29 | End: 2021-10-26

## 2021-10-08 DIAGNOSIS — I10 HYPERTENSION GOAL BP (BLOOD PRESSURE) < 140/90: ICD-10-CM

## 2021-10-12 RX ORDER — LISINOPRIL AND HYDROCHLOROTHIAZIDE 20; 25 MG/1; MG/1
TABLET ORAL
Qty: 90 TABLET | Refills: 0 | Status: SHIPPED | OUTPATIENT
Start: 2021-10-12 | End: 2021-11-04

## 2021-10-25 ENCOUNTER — TELEPHONE (OUTPATIENT)
Dept: FAMILY MEDICINE | Facility: CLINIC | Age: 61
End: 2021-10-25

## 2021-10-25 DIAGNOSIS — I10 HYPERTENSION GOAL BP (BLOOD PRESSURE) < 140/90: ICD-10-CM

## 2021-10-26 RX ORDER — AMLODIPINE BESYLATE 10 MG/1
TABLET ORAL
Qty: 90 TABLET | Refills: 1 | Status: SHIPPED | OUTPATIENT
Start: 2021-10-26 | End: 2021-11-04

## 2021-10-26 NOTE — TELEPHONE ENCOUNTER
"Requested Prescriptions   Pending Prescriptions Disp Refills     amLODIPine (NORVASC) 10 MG tablet [Pharmacy Med Name: AMLODIPINE BESYLATE TABS 10MG] 90 tablet 3     Sig: TAKE 1 TABLET DAILY       Calcium Channel Blockers Protocol  Failed - 10/25/2021 11:19 PM        Failed - Blood pressure under 140/90 in past 12 months     BP Readings from Last 3 Encounters:   10/15/20 138/86   07/07/20 138/86   02/06/20 (!) 166/92                 Failed - Recent (12 mo) or future (30 days) visit within the authorizing provider's specialty     Patient has had an office visit with the authorizing provider or a provider within the authorizing providers department within the previous 12 mos or has a future within next 30 days. See \"Patient Info\" tab in inbasket, or \"Choose Columns\" in Meds & Orders section of the refill encounter.              Passed - Medication is active on med list        Passed - Patient is age 18 or older        Passed - Normal serum creatinine on file in past 12 months     Recent Labs   Lab Test 02/09/21  0706   CR 0.90       Ok to refill medication if creatinine is low               "

## 2021-11-03 NOTE — PROGRESS NOTES
"  Assessment & Plan     Hypertension goal BP (blood pressure) < 140/90  Controlled.  Continue medications.  BP monitoring at home.  The risks, benefits and treatment options of prescribed medications or other treatments have been discussed with the patient. The patient verbalized their understanding and should call or follow up if no improvement or if they develop further problems.    - amLODIPine (NORVASC) 10 MG tablet  Dispense: 90 tablet; Refill: 3  - lisinopril-hydrochlorothiazide (ZESTORETIC) 20-25 MG tablet  Dispense: 90 tablet; Refill: 3  - **Basic metabolic panel FUTURE 6mo    Hyperlipidemia LDL goal <100   Lipids controlled.    - atorvastatin (LIPITOR) 20 MG tablet  Dispense: 90 tablet; Refill: 3  - Lipid panel reflex to direct LDL Fasting    High priority for 2019-nCoV vaccine     - COVID-19,PF,MODERNA (18+ Yrs BOOSTER .25mL)    Need for prophylactic vaccination and inoculation against influenza     - INFLUENZA QUAD, RECOMBINANT, P-FREE (RIV4) (FLUBLOK)    Need for vaccination     - TD PRSERV FREE >=7 YRS ADS IM [[id number]]        BMI:   Estimated body mass index is 29.43 kg/m  as calculated from the following:    Height as of this encounter: 1.715 m (5' 7.5\").    Weight as of this encounter: 86.5 kg (190 lb 11.2 oz).   Weight management plan: Discussed healthy diet and exercise guidelines    Work on weight loss  Regular exercise  Patient Instructions     Patient Education     Controlling High Blood Pressure   High blood pressure (hypertension) is often called the silent killer. This is because many people who have it, don t know it. It can be very dangerous. High blood pressure can raise your risk of heart attack, stroke, heart disease, and heart failure. Controlling your blood pressure can decrease your risk of these problems. It's important to know the appropriate blood pressure range and remember to check your blood pressure regularly. Doing so can save your life.   Blood pressure measurements are given " as 2 numbers. Systolic blood pressure is the upper number. This is the pressure when the heart contracts. Diastolic blood pressure is the lower number. This is the pressure when the heart relaxes between beats.   Blood pressure is categorized as normal, elevated, or stage 1 or stage 2 high blood pressure:     Normal blood pressure is systolic of less than 120 and diastolic of less than 80 (120/80)    Elevated blood pressure is systolic of 120 to 129 and diastolic less than 80    Stage 1 high blood pressure is systolic of 130 to 139 or diastolic between 80 to 89    Stage 2 high blood pressure is when systolic is 140 or higher or the diastolic is 90 or higher  A heart-healthy lifestyle can help you control your blood pressure without medicines. Here are some things you can do to pursue a heart-healthy lifestyle:     Choose heart-healthy foods     Select low-salt, low-fat foods. Limit sodium intake to 2,400 mg per day or the amount suggested by your healthcare provider.    Limit canned, dried, cured, packaged, and fast foods. These can contain a lot of salt.    Eat 8 to 10 servings of fruits and vegetables every day.    Choose lean meats, fish, or chicken.    Eat whole-grain pasta, brown rice, and beans.    Eat 2 to 3 servings of low-fat or fat-free dairy products.    Ask your doctor about the DASH eating plan. This plan helps reduce blood pressure.    When you go to a restaurant, ask that your meal be prepared with no added salt.    Stay at a healthy weight     Ask your healthcare provider how many calories to eat a day. Then stick to that number.    Ask your healthcare provider what weight range is healthiest for you. If you are overweight, a weight loss of only 3% to 5% of your body weight can help lower blood pressure. Generally, a good weight loss goal is to lose 10% of your body weight in a year.    Limit snacks and sweets.    Get regular exercise.    Get up and get active     Find activities you enjoy that can  be done alone or with friends or family. Such activities might include bicycling, dancing, walking, or jogging.    Park farther away from building entrances to walk more.    Use stairs instead of the elevator.    When you can, walk or bike instead of driving.    [place] leaves, garden, or do household repairs.    Be active at a moderate to vigorous level of physical activity for at least 40 minutes for a minimum of 3 to 4 days a week.     Manage stress     Make time to relax and enjoy life. Find time to laugh.    Communicate your concerns with your loved ones and your healthcare provider.    Visit with family and friends, and keep up with hobbies.    Limit alcohol and quit smoking     Men should have no more than 2 drinks per day.    Women should have no more than 1 drink per day.    Talk with your healthcare provider about quitting smoking. Smoking significantly increases your risk for heart disease and stroke. Ask your healthcare provider about community smoking cessation programs and other options.    Medicines  If lifestyle changes aren t enough, your healthcare provider may prescribe high blood pressure medicine. Take all medicines as prescribed. If you have any questions about your medicines, ask your healthcare provider before stopping or changing them.   [employer] last reviewed this educational content on 6/1/2019 2000-2021 [employer]. All rights reserved. This information is not intended as a substitute for professional medical care. Always follow your healthcare professional's instructions.               Return in about 6 months (around 5/4/2022) for Lab Work.    Lety Barger NP  [facility] 61 year [facility] who presents for the following health issues   Chief Complaint   Patient presents with     Hypertension     Pt here for f/u on b/p and cholesterol med.  Pt is fasting if labwork needed.     Imm/Inj     COVID-19 VACCINE     Imm/Inj      "Flu Shot       HPI     Hyperlipidemia Follow-Up      Are you regularly taking any medication or supplement to lower your cholesterol?   Yes- atorvastatin    Are you having muscle aches or other side effects that you think could be caused by your cholesterol lowering medication?  No    Hypertension Follow-up      Do you check your blood pressure regularly outside of the clinic? No     Are you following a low salt diet? Yes, does not add salt    Are your blood pressures ever more than 140 on the top number (systolic) OR more   than 90 on the bottom number (diastolic), for example 140/90? Does not check      How many servings of fruits and vegetables do you eat daily?  0-1    On average, how many sweetened beverages do you drink each day (Examples: soda, juice, sweet tea, etc.  Do NOT count diet or artificially sweetened beverages)?   0    How many days per week do you exercise enough to make your heart beat faster? active    How many minutes a day do you exercise enough to make your heart beat faster? active    How many days per week do you miss taking your medication? 0    BP Readings from Last 6 Encounters:   11/04/21 138/80   10/15/20 138/86   07/07/20 138/86   02/06/20 (!) 166/92   01/21/20 (!) 146/88   01/13/20 (!) 146/92       Retired last year - staying active.  No new complaints.  Doing well overall.  Rare alcohol use.  Not smoking.  NO chest pain or shortness of breath concerns.    Review of Systems   Constitutional, HEENT, cardiovascular, pulmonary, GI, , musculoskeletal, neuro, skin, endocrine and psych systems are negative, except as otherwise noted.      Objective    /80   Pulse 77   Temp 98.3  F (36.8  C) (Tympanic)   Resp 14   Ht 1.715 m (5' 7.5\")   Wt 86.5 kg (190 lb 11.2 oz)   SpO2 98%   BMI 29.43 kg/m    Body mass index is 29.43 kg/m .  Physical Exam   GENERAL: healthy, alert and no distress  NECK: no adenopathy, no asymmetry, masses, or scars and thyroid normal to palpation  RESP: " lungs clear to auscultation - no rales, rhonchi or wheezes  CV: regular rate and rhythm, normal S1 S2, no S3 or S4, no murmur, click or rub, no peripheral edema and peripheral pulses strong  ABDOMEN: soft, nontender, no hepatosplenomegaly, no masses and bowel sounds normal  MS: no gross musculoskeletal defects noted, no edema

## 2021-11-04 ENCOUNTER — OFFICE VISIT (OUTPATIENT)
Dept: FAMILY MEDICINE | Facility: CLINIC | Age: 61
End: 2021-11-04
Payer: COMMERCIAL

## 2021-11-04 VITALS
BODY MASS INDEX: 28.9 KG/M2 | TEMPERATURE: 98.3 F | SYSTOLIC BLOOD PRESSURE: 138 MMHG | RESPIRATION RATE: 14 BRPM | OXYGEN SATURATION: 98 % | DIASTOLIC BLOOD PRESSURE: 80 MMHG | WEIGHT: 190.7 LBS | HEART RATE: 77 BPM | HEIGHT: 68 IN

## 2021-11-04 DIAGNOSIS — Z23 NEED FOR PROPHYLACTIC VACCINATION AND INOCULATION AGAINST INFLUENZA: ICD-10-CM

## 2021-11-04 DIAGNOSIS — Z23 NEED FOR VACCINATION: ICD-10-CM

## 2021-11-04 DIAGNOSIS — E78.5 HYPERLIPIDEMIA LDL GOAL <100: ICD-10-CM

## 2021-11-04 DIAGNOSIS — Z23 HIGH PRIORITY FOR 2019-NCOV VACCINE: ICD-10-CM

## 2021-11-04 DIAGNOSIS — I10 HYPERTENSION GOAL BP (BLOOD PRESSURE) < 140/90: Primary | ICD-10-CM

## 2021-11-04 PROCEDURE — 90682 RIV4 VACC RECOMBINANT DNA IM: CPT | Performed by: NURSE PRACTITIONER

## 2021-11-04 PROCEDURE — 99214 OFFICE O/P EST MOD 30 MIN: CPT | Mod: 25 | Performed by: NURSE PRACTITIONER

## 2021-11-04 PROCEDURE — 90471 IMMUNIZATION ADMIN: CPT | Performed by: NURSE PRACTITIONER

## 2021-11-04 PROCEDURE — 90472 IMMUNIZATION ADMIN EACH ADD: CPT | Performed by: NURSE PRACTITIONER

## 2021-11-04 PROCEDURE — 91301 COVID-19,PF,MODERNA (18+ YRS BOOSTER .25ML): CPT | Performed by: NURSE PRACTITIONER

## 2021-11-04 PROCEDURE — 90714 TD VACC NO PRESV 7 YRS+ IM: CPT | Performed by: NURSE PRACTITIONER

## 2021-11-04 RX ORDER — AMLODIPINE BESYLATE 10 MG/1
10 TABLET ORAL DAILY
Qty: 90 TABLET | Refills: 3 | Status: SHIPPED | OUTPATIENT
Start: 2021-11-04 | End: 2022-03-08

## 2021-11-04 RX ORDER — ATORVASTATIN CALCIUM 20 MG/1
20 TABLET, FILM COATED ORAL DAILY
Qty: 90 TABLET | Refills: 3 | Status: SHIPPED | OUTPATIENT
Start: 2021-11-04 | End: 2022-03-08

## 2021-11-04 RX ORDER — LISINOPRIL AND HYDROCHLOROTHIAZIDE 20; 25 MG/1; MG/1
1 TABLET ORAL DAILY
Qty: 90 TABLET | Refills: 3 | Status: SHIPPED | OUTPATIENT
Start: 2021-11-04 | End: 2022-03-08

## 2021-11-04 ASSESSMENT — MIFFLIN-ST. JEOR: SCORE: 1646.57

## 2021-11-04 NOTE — PATIENT INSTRUCTIONS
Patient Education     Controlling High Blood Pressure   High blood pressure (hypertension) is often called the silent killer. This is because many people who have it, don t know it. It can be very dangerous. High blood pressure can raise your risk of heart attack, stroke, heart disease, and heart failure. Controlling your blood pressure can decrease your risk of these problems. It's important to know the appropriate blood pressure range and remember to check your blood pressure regularly. Doing so can save your life.   Blood pressure measurements are given as 2 numbers. Systolic blood pressure is the upper number. This is the pressure when the heart contracts. Diastolic blood pressure is the lower number. This is the pressure when the heart relaxes between beats.   Blood pressure is categorized as normal, elevated, or stage 1 or stage 2 high blood pressure:     Normal blood pressure is systolic of less than 120 and diastolic of less than 80 (120/80)    Elevated blood pressure is systolic of 120 to 129 and diastolic less than 80    Stage 1 high blood pressure is systolic of 130 to 139 or diastolic between 80 to 89    Stage 2 high blood pressure is when systolic is 140 or higher or the diastolic is 90 or higher  A heart-healthy lifestyle can help you control your blood pressure without medicines. Here are some things you can do to pursue a heart-healthy lifestyle:     Choose heart-healthy foods     Select low-salt, low-fat foods. Limit sodium intake to 2,400 mg per day or the amount suggested by your healthcare provider.    Limit canned, dried, cured, packaged, and fast foods. These can contain a lot of salt.    Eat 8 to 10 servings of fruits and vegetables every day.    Choose lean meats, fish, or chicken.    Eat whole-grain pasta, brown rice, and beans.    Eat 2 to 3 servings of low-fat or fat-free dairy products.    Ask your doctor about the DASH eating plan. This plan helps reduce blood pressure.    When you go  to a restaurant, ask that your meal be prepared with no added salt.    Stay at a healthy weight     Ask your healthcare provider how many calories to eat a day. Then stick to that number.    Ask your healthcare provider what weight range is healthiest for you. If you are overweight, a weight loss of only 3% to 5% of your body weight can help lower blood pressure. Generally, a good weight loss goal is to lose 10% of your body weight in a year.    Limit snacks and sweets.    Get regular exercise.    Get up and get active     Find activities you enjoy that can be done alone or with friends or family. Such activities might include bicycling, dancing, walking, or jogging.    Park farther away from building entrances to walk more.    Use stairs instead of the elevator.    When you can, walk or bike instead of driving.    [place] leaves, garden, or do household repairs.    Be active at a moderate to vigorous level of physical activity for at least 40 minutes for a minimum of 3 to 4 days a week.     Manage stress     Make time to relax and enjoy life. Find time to laugh.    Communicate your concerns with your loved ones and your healthcare provider.    Visit with family and friends, and keep up with hobbies.    Limit alcohol and quit smoking     Men should have no more than 2 drinks per day.    Women should have no more than 1 drink per day.    Talk with your healthcare provider about quitting smoking. Smoking significantly increases your risk for heart disease and stroke. Ask your healthcare provider about community smoking cessation programs and other options.    Medicines  If lifestyle changes aren t enough, your healthcare provider may prescribe high blood pressure medicine. Take all medicines as prescribed. If you have any questions about your medicines, ask your healthcare provider before stopping or changing them.   [employer] last reviewed this educational content on 6/1/2019 2000-2021 [employer]. All  rights reserved. This information is not intended as a substitute for professional medical care. Always follow your healthcare professional's instructions.

## 2022-02-15 ENCOUNTER — E-VISIT (OUTPATIENT)
Dept: FAMILY MEDICINE | Facility: CLINIC | Age: 62
End: 2022-02-15
Payer: COMMERCIAL

## 2022-02-15 ENCOUNTER — TELEPHONE (OUTPATIENT)
Dept: FAMILY MEDICINE | Facility: CLINIC | Age: 62
End: 2022-02-15

## 2022-02-15 DIAGNOSIS — R19.7 DIARRHEA, UNSPECIFIED TYPE: Primary | ICD-10-CM

## 2022-02-15 PROCEDURE — 99421 OL DIG E/M SVC 5-10 MIN: CPT | Performed by: NURSE PRACTITIONER

## 2022-02-15 NOTE — PATIENT INSTRUCTIONS
Thank you for choosing us for your care. Given your symptoms, I would like you to do a lab-only visit to determine what is causing them.  I have placed the orders.  Please schedule an appointment with the lab right here in [facility] [facility], or call [phone].  I will let you know when the results are back and next steps to take.

## 2022-02-17 NOTE — TELEPHONE ENCOUNTER
Lab supplies collected, labels placed, directions, etc are placed up front. Patient is called and notified, he will  on Monday as he is out of town till Sunday. Lab appointment is cancelled as patient will drop off the samples when completed.    SIDDHARTHA Hopkins

## 2022-02-22 ENCOUNTER — LAB (OUTPATIENT)
Dept: LAB | Facility: CLINIC | Age: 62
End: 2022-02-22
Payer: COMMERCIAL

## 2022-02-22 DIAGNOSIS — A04.72 COLITIS DUE TO CLOSTRIDIUM DIFFICILE: Primary | ICD-10-CM

## 2022-02-22 DIAGNOSIS — R19.7 DIARRHEA, UNSPECIFIED TYPE: ICD-10-CM

## 2022-02-22 LAB
C COLI+JEJUNI+LARI FUSA STL QL NAA+PROBE: NOT DETECTED
C DIFF TOX B STL QL: POSITIVE
EC STX1 GENE STL QL NAA+PROBE: NOT DETECTED
EC STX2 GENE STL QL NAA+PROBE: NOT DETECTED
NOROV GI+II ORF1-ORF2 JNC STL QL NAA+PR: NOT DETECTED
RVA NSP5 STL QL NAA+PROBE: NOT DETECTED
SALMONELLA SP RPOD STL QL NAA+PROBE: NOT DETECTED
SHIGELLA SP+EIEC IPAH STL QL NAA+PROBE: NOT DETECTED
V CHOL+PARA RFBL+TRKH+TNAA STL QL NAA+PR: NOT DETECTED
Y ENTERO RECN STL QL NAA+PROBE: NOT DETECTED

## 2022-02-22 PROCEDURE — 87209 SMEAR COMPLEX STAIN: CPT

## 2022-02-22 PROCEDURE — 87506 IADNA-DNA/RNA PROBE TQ 6-11: CPT

## 2022-02-22 PROCEDURE — 87177 OVA AND PARASITES SMEARS: CPT

## 2022-02-22 PROCEDURE — 87493 C DIFF AMPLIFIED PROBE: CPT | Mod: 59

## 2022-02-22 RX ORDER — VANCOMYCIN HYDROCHLORIDE 125 MG/1
125 CAPSULE ORAL 4 TIMES DAILY
Qty: 40 CAPSULE | Refills: 0 | Status: SHIPPED | OUTPATIENT
Start: 2022-02-22 | End: 2022-03-04

## 2022-02-23 LAB — O+P STL MICRO: NEGATIVE

## 2022-02-27 ENCOUNTER — HEALTH MAINTENANCE LETTER (OUTPATIENT)
Age: 62
End: 2022-02-27

## 2022-03-08 ENCOUNTER — OFFICE VISIT (OUTPATIENT)
Dept: FAMILY MEDICINE | Facility: CLINIC | Age: 62
End: 2022-03-08
Payer: COMMERCIAL

## 2022-03-08 VITALS
RESPIRATION RATE: 16 BRPM | WEIGHT: 194 LBS | SYSTOLIC BLOOD PRESSURE: 138 MMHG | DIASTOLIC BLOOD PRESSURE: 84 MMHG | OXYGEN SATURATION: 98 % | HEIGHT: 68 IN | HEART RATE: 104 BPM | BODY MASS INDEX: 29.4 KG/M2

## 2022-03-08 DIAGNOSIS — Z86.19 HISTORY OF CLOSTRIDIUM DIFFICILE INFECTION: ICD-10-CM

## 2022-03-08 DIAGNOSIS — I10 HYPERTENSION GOAL BP (BLOOD PRESSURE) < 140/90: Primary | ICD-10-CM

## 2022-03-08 DIAGNOSIS — E78.5 HYPERLIPIDEMIA LDL GOAL <100: ICD-10-CM

## 2022-03-08 LAB
ANION GAP SERPL CALCULATED.3IONS-SCNC: 6 MMOL/L (ref 3–14)
BUN SERPL-MCNC: 14 MG/DL (ref 7–30)
CALCIUM SERPL-MCNC: 9 MG/DL (ref 8.5–10.1)
CHLORIDE BLD-SCNC: 103 MMOL/L (ref 94–109)
CHOLEST SERPL-MCNC: 193 MG/DL
CO2 SERPL-SCNC: 28 MMOL/L (ref 20–32)
CREAT SERPL-MCNC: 0.78 MG/DL (ref 0.66–1.25)
FASTING STATUS PATIENT QL REPORTED: NO
GFR SERPL CREATININE-BSD FRML MDRD: >90 ML/MIN/1.73M2
GLUCOSE BLD-MCNC: 105 MG/DL (ref 70–99)
HDLC SERPL-MCNC: 103 MG/DL
LDLC SERPL CALC-MCNC: 74 MG/DL
NONHDLC SERPL-MCNC: 90 MG/DL
POTASSIUM BLD-SCNC: 3.4 MMOL/L (ref 3.4–5.3)
SODIUM SERPL-SCNC: 137 MMOL/L (ref 133–144)
TRIGL SERPL-MCNC: 78 MG/DL

## 2022-03-08 PROCEDURE — 80061 LIPID PANEL: CPT | Performed by: NURSE PRACTITIONER

## 2022-03-08 PROCEDURE — 36415 COLL VENOUS BLD VENIPUNCTURE: CPT | Performed by: NURSE PRACTITIONER

## 2022-03-08 PROCEDURE — 99214 OFFICE O/P EST MOD 30 MIN: CPT | Performed by: NURSE PRACTITIONER

## 2022-03-08 PROCEDURE — 80048 BASIC METABOLIC PNL TOTAL CA: CPT | Performed by: NURSE PRACTITIONER

## 2022-03-08 RX ORDER — ATORVASTATIN CALCIUM 20 MG/1
20 TABLET, FILM COATED ORAL DAILY
Qty: 90 TABLET | Refills: 3 | Status: SHIPPED | OUTPATIENT
Start: 2022-03-08 | End: 2023-03-06

## 2022-03-08 RX ORDER — AMLODIPINE BESYLATE 10 MG/1
10 TABLET ORAL DAILY
Qty: 90 TABLET | Refills: 3 | Status: SHIPPED | OUTPATIENT
Start: 2022-03-08 | End: 2023-03-29

## 2022-03-08 RX ORDER — LISINOPRIL 20 MG/1
20 TABLET ORAL
COMMUNITY
End: 2022-03-08

## 2022-03-08 RX ORDER — LISINOPRIL AND HYDROCHLOROTHIAZIDE 20; 25 MG/1; MG/1
1 TABLET ORAL DAILY
Qty: 90 TABLET | Refills: 3 | Status: SHIPPED | OUTPATIENT
Start: 2022-03-08 | End: 2023-03-14

## 2022-03-08 ASSESSMENT — PAIN SCALES - GENERAL: PAINLEVEL: NO PAIN (0)

## 2022-03-08 NOTE — LETTER
March 9, 2022      Jarred NOMI Buck  [address] [place] [address]        Dear ,    We are writing to inform you of your test results.    All of your lab results are normal.    Resulted Orders   **Basic metabolic panel FUTURE 6mo   Result Value Ref Range    Sodium 137 133 - 144 mmol/L    Potassium 3.4 3.4 - 5.3 mmol/L    Chloride 103 94 - 109 mmol/L    Carbon Dioxide (CO2) 28 20 - 32 mmol/L    Anion Gap 6 3 - 14 mmol/L    Urea Nitrogen 14 7 - 30 mg/dL    Creatinine 0.78 0.66 - 1.25 mg/dL    Calcium 9.0 8.5 - 10.1 mg/dL    Glucose 105 (H) 70 - 99 mg/dL    GFR Estimate >90 >60 mL/min/1.73m2      Comment:      Effective December 21, 2021 eGFRcr in adults is calculated using the 2021 CKD-EPI creatinine equation which includes age and gender (Kirsty et al., NEJ, DOI: 10.1056/VZIGow1178322)   Lipid panel reflex to direct LDL Non-fasting   Result Value Ref Range    Cholesterol 193 <200 mg/dL    Triglycerides 78 <150 mg/dL    Direct Measure  >=40 mg/dL    LDL Cholesterol Calculated 74 <=100 mg/dL    Non HDL Cholesterol 90 <130 mg/dL    Patient Fasting > 8hrs? No     Narrative    Cholesterol  Desirable:  <200 mg/dL    Triglycerides  Normal:  Less than 150 mg/dL  Borderline High:  150-199 mg/dL  High:  200-499 mg/dL  Very High:  Greater than or equal to 500 mg/dL    Direct Measure HDL  Female:  Greater than or equal to 50 mg/dL   Male:  Greater than or equal to 40 mg/dL    LDL Cholesterol  Desirable:  <100mg/dL  Above Desirable:  100-129 mg/dL   Borderline High:  130-159 mg/dL   High:  160-189 mg/dL   Very High:  >= 190 mg/dL    Non HDL Cholesterol  Desirable:  130 mg/dL  Above Desirable:  130-159 mg/dL  Borderline High:  160-189 mg/dL  High:  190-219 mg/dL  Very High:  Greater than or equal to 220 mg/dL       If you have any questions or concerns, please call the clinic at the number listed above.       Sincerely,      Lety Barger NP

## 2022-03-08 NOTE — PROGRESS NOTES
Assessment & Plan     Hypertension goal BP (blood pressure) < 140/90  Controlled.  Refilled.  Labs ordered.    - **Basic metabolic panel FUTURE 6mo  - lisinopril-hydrochlorothiazide (ZESTORETIC) 20-25 MG tablet  Dispense: 90 tablet; Refill: 3  - amLODIPine (NORVASC) 10 MG tablet  Dispense: 90 tablet; Refill: 3    Hyperlipidemia LDL goal <100  Continue statin.  Fasting today.    - Lipid panel reflex to direct LDL Fasting  - atorvastatin (LIPITOR) 20 MG tablet  Dispense: 90 tablet; Refill: 3    History of Clostridium difficile infection  Symptoms resolved.  Unsure of how this occurred.          Patient Instructions       Patient Education     Prevention Guidelines, Men Ages 50 to 64  Screening tests and vaccines are an important part of managing your health. A screening test is done to find diseases in people who don't have any symptoms. The goal is to find a disease early so lifestyle changes and checkups can reduce the risk of disease. Or the goal may be to detect it early to treat it most effectively. Screening tests are not used to diagnose a disease. But they are used to see if more testing is needed. Health counseling is important, too. Below are guidelines for these, for men ages 50 to 64. Keep in mind that screening recommendations vary among expert groups. Talk with your healthcare provider about which tests are best for you and to make sure you re up-to-date on what you need.   Screening  Who needs it  How often    Unhealthy alcohol use  All men in this age group  At routine exams   Blood pressure All men in this age group  Yearly checkup if your blood pressure is normal   Normal blood pressure is less than 120/80 mm Hg   If your blood pressure reading is higher than normal, follow the advice of your healthcare provider    Colorectal cancer All men at average risk in this age group  Multiple tests are available and are used at different times. Possible tests include:     Flexible sigmoidoscopy every 5  years, or    Colonoscopy every 10 years, or    CT colonography (virtual colonoscopy) every 5 years, or    Yearly fecal occult blood test, or    Yearly fecal immunochemical test every year, or    Stool DNA test, every 3 years  If you choose a test other than a colonoscopy and have an abnormal test result, you will need to follow-up with a colonoscopy. Screening recommendations advice vary varies among expert groups. Talk with your healthcare provider about which tests are best for you.   Some people should be screened using a different schedule because of their personal or family health history. Talk with your healthcare provider about your health history.    Depression All men in this age group  At routine exams   Type 2 diabetes or prediabetes  All men beginning at age 45 and men without symptoms at any age who are overweight or obese and have 1 or more other risk factors for diabetes  At least every 3 years (yearly if your blood sugar has already begun to rise)    Type 2 diabetes All men with prediabetes  Every year   Hepatitis C Men at increased risk for infection; 1 time for those born between 1945 and 1965  At routine exams; talk with your healthcare provider.    High cholesterol or triglycerides  All men in this age group  At least every 5 years; talk with your healthcare provider about your risk    HIV All males up to age 64 and men at increased risk.  At least once up to age 64 at routine exams; talk with your healthcare provider if you are at risk    Lung cancer Men between the ages of 55 to 74 who in fairly good health and are at higher risk for lung cancer     Currently smoke or who have quit within past 15 years    30-pack year smoking history  a Eligibility criteria and age limit (possibly up to age 80) may vary across major organizations      Yearly lung cancer screening with a low-dose CT scan (LDCT); talk with your healthcare provider    Obesity All men in this age group  At yearly routine exams     BMI (body mass index) All men in this age group Every year, to help find out if you are at a healthy weight for your height    Prostate cancer Starting at age 50, talk with your healthcare provider about risks and benefits of testing with digital rectal exam (LAUREN) and prostate-specific antigen (PSA) screening  At routine exams if you decide to be tested.    Syphilis Men at increased risk for infection  At routine exams; talk with your healthcare provider    Tuberculosis Men at increased risk for infection  Talk with your healthcare provider    Vision All men in this age group  Talk with your healthcare provider   Vaccine Who needs it How often   Chickenpox (varicella)  All men in this age group who have no record of this infection or vaccine  2 doses; second dose should be given at least 4 weeks after the first dose    Hepatitis A Men at increased risk for infection  2 or 3 doses (depending on the vaccine) given at least 6 months apart; talk with your healthcare provider    Hepatitis B Men at increased risk for infection  2 or 3 doses (depending on the vaccine) second dose should be given 1 month after the first dose; if a third dose , it should be given at least 2 months after the second dose and at least 4 months after the first dose; talk with your healthcare provider    Haemophilus influenzae Type B (HIB)  Men at increased risk for infection  1 or 3 doses; talk with your healthcare provider    Influenza (flu) All men in this age group  Once a year   Measles, mumps, rubella (MMR)  Men in this age group born in 1957 or later who have no record of these infections or vaccines  1 or 2 doses; talk with your healthcare provider    Meningococcal ACWY (MenACWY)  Men at increased risk for infection  1 or 2 doses depending on your case. Then a booster every 5 years if you are still at risk. Talk with your healthcare provider.    Meningococcal B (MenB) Men at increased risk for infection 2 or 3 doses, depending on the  vaccine and your case; talk with your healthcare provider    Pneumococcal conjugate vaccine (PCV13) and pneumococcal polysaccharide vaccine (PPSV23)  Men at increased risk for infection  PCV13: 1 dose ages 19 to 65 (protects against 13 types of pneumococcal bacteria)   PPSV23: 1 to 2 doses through age 64, (protects against 23 types of pneumococcal bacteria)    Tetanus/diphtheria/pertussis (Td/Tdap) booster All men in this age group  Td every 10 years, or a 1-time dose of Tdap instead of a Td booster after age 18, then Td every 10 years    Recombinant zoster vaccine (RZV0)  All men in this age group  2 doses; the 2nd dose is given 2 to 6 months after the first. This is given even if you've had shingles before or had a previous zoster live vaccine    Counseling Who needs it How often   Diet and exercise Men who are overweight or obese  When diagnosed, and then at routine exams    Sexually transmitted infection prevention  Men at increased risk for infection  At routine exams; talk with your healthcare provider    Use of daily aspirin  Men ages 50 years and up in this age group who are at high risk for cardiovascular health problems and not at increased risk for bleeding as identified by their healthcare provider y  At routine exams; talk with your healthcare provider    Use of statins Men between the ages of 40 and 75 years who have     An LDL-C level of more than 70 mg/dL but less than 190 mg/dL, no diabetes and borderline to high level of risk    An LDL-C level of 190 mg/dL or greater    A diagnosis of diabetes and LDL-C level of greater than 70mg/dL At routine exams, or more often as directed by your healthcare provider. Statin dosages may vary based on your overall health, risk factors, and other health conditions such as diabetes. Talk with your healthcare provider about your risk .    Use of tobacco and the health effects it can cause  All men in this age group  Every exam   [employer] last reviewed this  educational content on 5/1/2019 2000-2021 [employer]. All rights reserved. This information is not intended as a substitute for professional medical care. Always follow your healthcare professional's instructions.           Patient Education     Established High Blood Pressure    High blood pressure (hypertension) is a long-term (chronic) disease. Often healthcare providers don t know what causes it. But it can be caused by certain health conditions and medicines.  If you have high blood pressure, you may not have any symptoms. If you do have symptoms, they may include:    Headache    Dizziness    Changes in your vision    Chest pain    Shortness of breath  But even without symptoms, high blood pressure that s not treated raises your risk for heart attack, heart failure, kidney disease, and stroke. High blood pressure is a serious health risk and shouldn t be ignored.  Blood pressure measurements are given as 2 numbers. Systolic blood pressure is the upper number. This is the pressure when the heart contracts. Diastolic blood pressure is the lower number. This is the pressure when the heart relaxes between beats. You will see your blood pressure readings written together. For example, a person with a systolic pressure of 118 and a diastolic pressure of 78 will have 118/78 written in the medical record.  Blood pressure is classified as normal, raised (elevated) or stage 1 or stage 2 high blood pressure:    Normal blood pressure. Systolic of less than 120 and diastolic of less than 80 (120/80).    Elevated blood pressure. Systolic of 120 to 129 and diastolic less than 80.    Stage 1 high blood pressure. Systolic is 130 to 139 or diastolic between 80 to 89.    Stage 2 high blood pressure. Systolic is 140 or higher or the diastolic is 90 or higher.  Home care  If you have high blood pressure, follow these home care guidelines to help lower your blood pressure. If you are taking medicines for high blood  pressure, these methods may reduce or end your need for medicines in the future.    Start a weight-loss program if you are overweight.    Cut back on how much salt you get in your diet. Here s how to do this:  ? Don t eat foods that have a lot of salt. These include olives, pickles, smoked meats, and salted potato chips.  ? Don t add salt to your food at the table.  ? Use only small amounts of salt when cooking.    Start an exercise program. Talk with your healthcare provider about the type of exercise program that would be best for you. It doesn't have to be hard. Even brisk walking for 20 minutes 3 times a week is a good form of exercise.    Don t take medicines that stimulate the heart. This includes many over-the-counter cold and sinus decongestant pills and sprays, as well as diet pills. Check the warnings about high blood pressure on the label. Before buying any over-the-counter medicines or supplements, always ask the pharmacist about the product's possible interaction with your high blood pressure and your high blood pressure medicines.    Stimulants such as amphetamine or cocaine could be deadly for someone with high blood pressure. Never take these.    Limit how much caffeine you get in your diet. Switch to caffeine-free products.    Stop smoking. If you are a long-time smoker, this can be hard. Talk with your healthcare provider about medicines and nicotine replacement options to help you. Also join a stop-smoking program . This makes it more likely that you will quit for good.    Learn how to handle stress. This is an important part of any program to lower blood pressure. Learn about relaxation methods such as meditation, yoga, or biofeedback.    If your provider prescribed medicines, take them exactly as directed. Missing doses may cause your blood pressure get out of control.    If you miss a dose, check with your healthcare provider or pharmacist about what to do.    Think about buying an automatic  blood pressure machine to check your blood pressure at home. Ask your provider for a recommendation. You can get one of these at most pharmacies.  Using a home blood pressure monitor  The American Heart Association advises the following guidelines for home blood pressure monitoring:    Don't smoke or drink coffee for 30 minutes before taking your blood pressure.    Go to the bathroom before the test.    Relax for 5 minutes before taking the measurement.    Sit with your back supported (don't sit on a couch or soft chair). Keep your feet on the floor uncrossed. Place your arm on a solid flat surface (such as a table) with the upper part of the arm at heart level. Place the middle of the cuff directly above the bend of the elbow. Check the monitor's instruction manual for an illustration.    Take multiple readings. When you measure, take 2 to 3 readings one minute apart and record all of the results.    Take your blood pressure at the same time every day, or as your healthcare provider advises.    Record the date, time, and blood pressure reading.    Take the record with you to your next healthcare appointment. If your blood pressure monitor has a built-in memory, just take the monitor with you to your next appointment.    Call your provider if you have several high readings. Don't be frightened by one high blood pressure reading. But if you get a few high readings, check in with your healthcare provider.  Follow-up care  You will need to see your healthcare provider regularly. This is to check your blood pressure and to make changes to your medicines. Make a follow-up appointment as directed. Bring the record of your home blood pressure readings to the appointment.  Call 911  Call 911 if you have any of these:    Blood pressure of 180/120 or higher    Chest pain or shortness of breath    Weakness of an arm or leg or one side of the face    Problems speaking or seeing     When to get medical advice  Call your  healthcare provider right away if any of these occur:    Severe headache    Throbbing or rushing sound in the ears    Nosebleed    Sudden severe pain in your belly (abdomen)    Extreme drowsiness, confusion, or fainting    Dizziness or spinning feeling (vertigo)  [employer]olanda last reviewed this educational content on 7/1/2019 2000-2021 [employer]. All rights reserved. This information is not intended as a substitute for professional medical care. Always follow your healthcare professional's instructions.               Return in about 1 year (around 3/8/2023) for Physical Exam.    Lety Barger NP  [facility] VANNESA Stern is a 61 year old who presents for the following health issues     History of Present Illness       Reason for visit:  So i can reup prescriptions    He eats 2-3 servings of fruits and vegetables daily.He consumes 0 sweetened beverage(s) daily.He exercises with enough effort to increase his heart rate 9 or less minutes per day.  He exercises with enough effort to increase his heart rate 3 or less days per week.   He is taking medications regularly.       Hyperlipidemia Follow-Up      Are you regularly taking any medication or supplement to lower your cholesterol?   Yes- takes medications    Are you having muscle aches or other side effects that you think could be caused by your cholesterol lowering medication?  No    Hypertension Follow-up      Do you check your blood pressure regularly outside of the clinic? Yes     Are you following a low salt diet? No    Are your blood pressures ever more than 140 on the top number (systolic) OR more   than 90 on the bottom number (diastolic), for example 140/90? No   Home readings are 130/80's.    History of c diff recently   Feeling better.  Finished antibiotics last week.    Review of Systems   Constitutional, HEENT, cardiovascular, pulmonary, GI, , musculoskeletal, neuro, skin, endocrine and psych systems  "are negative, except as otherwise noted.      Objective    /84   Pulse 104   Resp 16   Ht 1.715 m (5' 7.5\")   Wt 88 kg (194 lb)   SpO2 98%   BMI 29.94 kg/m    Body mass index is 29.94 kg/m .  Physical Exam   GENERAL: healthy, alert and no distress  NECK: no adenopathy, no asymmetry, masses, or scars and thyroid normal to palpation  RESP: lungs clear to auscultation - no rales, rhonchi or wheezes  CV: regular rate and rhythm, normal S1 S2, no S3 or S4, no murmur, click or rub, no peripheral edema and peripheral pulses strong  ABDOMEN: soft, nontender, no hepatosplenomegaly, no masses and bowel sounds normal  MS: no gross musculoskeletal defects noted, no edema            "

## 2022-03-08 NOTE — PATIENT INSTRUCTIONS
Patient Education     Prevention Guidelines, Men Ages 50 to 64  Screening tests and vaccines are an important part of managing your health. A screening test is done to find diseases in people who don't have any symptoms. The goal is to find a disease early so lifestyle changes and checkups can reduce the risk of disease. Or the goal may be to detect it early to treat it most effectively. Screening tests are not used to diagnose a disease. But they are used to see if more testing is needed. Health counseling is important, too. Below are guidelines for these, for men ages 50 to 64. Keep in mind that screening recommendations vary among expert groups. Talk with your healthcare provider about which tests are best for you and to make sure you re up-to-date on what you need.   Screening  Who needs it  How often    Unhealthy alcohol use  All men in this age group  At routine exams   Blood pressure All men in this age group  Yearly checkup if your blood pressure is normal   Normal blood pressure is less than 120/80 mm Hg   If your blood pressure reading is higher than normal, follow the advice of your healthcare provider    Colorectal cancer All men at average risk in this age group  Multiple tests are available and are used at different times. Possible tests include:     Flexible sigmoidoscopy every 5 years, or    Colonoscopy every 10 years, or    CT colonography (virtual colonoscopy) every 5 years, or    Yearly fecal occult blood test, or    Yearly fecal immunochemical test every year, or    Stool DNA test, every 3 years  If you choose a test other than a colonoscopy and have an abnormal test result, you will need to follow-up with a colonoscopy. Screening recommendations advice vary varies among expert groups. Talk with your healthcare provider about which tests are best for you.   Some people should be screened using a different schedule because of their personal or family health history. Talk with your healthcare  provider about your health history.    Depression All men in this age group  At routine exams   Type 2 diabetes or prediabetes  All men beginning at age 45 and men without symptoms at any age who are overweight or obese and have 1 or more other risk factors for diabetes  At least every 3 years (yearly if your blood sugar has already begun to rise)    Type 2 diabetes All men with prediabetes  Every year   Hepatitis C Men at increased risk for infection; 1 time for those born between 1945 and 1965  At routine exams; talk with your healthcare provider.    High cholesterol or triglycerides  All men in this age group  At least every 5 years; talk with your healthcare provider about your risk    HIV All males up to age 64 and men at increased risk.  At least once up to age 64 at routine exams; talk with your healthcare provider if you are at risk    Lung cancer Men between the ages of 55 to 74 who in fairly good health and are at higher risk for lung cancer     Currently smoke or who have quit within past 15 years    30-pack year smoking history  a Eligibility criteria and age limit (possibly up to age 80) may vary across major organizations      Yearly lung cancer screening with a low-dose CT scan (LDCT); talk with your healthcare provider    Obesity All men in this age group  At yearly routine exams    BMI (body mass index) All men in this age group Every year, to help find out if you are at a healthy weight for your height    Prostate cancer Starting at age 50, talk with your healthcare provider about risks and benefits of testing with digital rectal exam (LAUREN) and prostate-specific antigen (PSA) screening  At routine exams if you decide to be tested.    Syphilis Men at increased risk for infection  At routine exams; talk with your healthcare provider    Tuberculosis Men at increased risk for infection  Talk with your healthcare provider    Vision All men in this age group  Talk with your healthcare provider   Vaccine  Who needs it How often   Chickenpox (varicella)  All men in this age group who have no record of this infection or vaccine  2 doses; second dose should be given at least 4 weeks after the first dose    Hepatitis A Men at increased risk for infection  2 or 3 doses (depending on the vaccine) given at least 6 months apart; talk with your healthcare provider    Hepatitis B Men at increased risk for infection  2 or 3 doses (depending on the vaccine) second dose should be given 1 month after the first dose; if a third dose , it should be given at least 2 months after the second dose and at least 4 months after the first dose; talk with your healthcare provider    Haemophilus influenzae Type B (HIB)  Men at increased risk for infection  1 or 3 doses; talk with your healthcare provider    Influenza (flu) All men in this age group  Once a year   Measles, mumps, rubella (MMR)  Men in this age group born in 1957 or later who have no record of these infections or vaccines  1 or 2 doses; talk with your healthcare provider    Meningococcal ACWY (MenACWY)  Men at increased risk for infection  1 or 2 doses depending on your case. Then a booster every 5 years if you are still at risk. Talk with your healthcare provider.    Meningococcal B (MenB) Men at increased risk for infection 2 or 3 doses, depending on the vaccine and your case; talk with your healthcare provider    Pneumococcal conjugate vaccine (PCV13) and pneumococcal polysaccharide vaccine (PPSV23)  Men at increased risk for infection  PCV13: 1 dose ages 19 to 65 (protects against 13 types of pneumococcal bacteria)   PPSV23: 1 to 2 doses through age 64, (protects against 23 types of pneumococcal bacteria)    Tetanus/diphtheria/pertussis (Td/Tdap) booster All men in this age group  Td every 10 years, or a 1-time dose of Tdap instead of a Td booster after age 18, then Td every 10 years    Recombinant zoster vaccine (RZV0)  All men in this age group  2 doses; the 2nd dose is  given 2 to 6 months after the first. This is given even if you've had shingles before or had a previous zoster live vaccine    Counseling Who needs it How often   Diet and exercise Men who are overweight or obese  When diagnosed, and then at routine exams    Sexually transmitted infection prevention  Men at increased risk for infection  At routine exams; talk with your healthcare provider    Use of daily aspirin  Men ages 50 years and up in this age group who are at high risk for cardiovascular health problems and not at increased risk for bleeding as identified by their healthcare provider y  At routine exams; talk with your healthcare provider    Use of statins Men between the ages of 40 and 75 years who have     An LDL-C level of more than 70 mg/dL but less than 190 mg/dL, no diabetes and borderline to high level of risk    An LDL-C level of 190 mg/dL or greater    A diagnosis of diabetes and LDL-C level of greater than 70mg/dL At routine exams, or more often as directed by your healthcare provider. Statin dosages may vary based on your overall health, risk factors, and other health conditions such as diabetes. Talk with your healthcare provider about your risk .    Use of tobacco and the health effects it can cause  All men in this age group  Every exam   [employer] last reviewed this educational content on 5/1/2019 2000-2021 [employer]. All rights reserved. This information is not intended as a substitute for professional medical care. Always follow your healthcare professional's instructions.           Patient Education     Established High Blood Pressure    High blood pressure (hypertension) is a long-term (chronic) disease. Often healthcare providers don t know what causes it. But it can be caused by certain health conditions and medicines.  If you have high blood pressure, you may not have any symptoms. If you do have symptoms, they may include:    Headache    Dizziness    Changes in your  vision    Chest pain    Shortness of breath  But even without symptoms, high blood pressure that s not treated raises your risk for heart attack, heart failure, kidney disease, and stroke. High blood pressure is a serious health risk and shouldn t be ignored.  Blood pressure measurements are given as 2 numbers. Systolic blood pressure is the upper number. This is the pressure when the heart contracts. Diastolic blood pressure is the lower number. This is the pressure when the heart relaxes between beats. You will see your blood pressure readings written together. For example, a person with a systolic pressure of 118 and a diastolic pressure of 78 will have 118/78 written in the medical record.  Blood pressure is classified as normal, raised (elevated) or stage 1 or stage 2 high blood pressure:    Normal blood pressure. Systolic of less than 120 and diastolic of less than 80 (120/80).    Elevated blood pressure. Systolic of 120 to 129 and diastolic less than 80.    Stage 1 high blood pressure. Systolic is 130 to 139 or diastolic between 80 to 89.    Stage 2 high blood pressure. Systolic is 140 or higher or the diastolic is 90 or higher.  Home care  If you have high blood pressure, follow these home care guidelines to help lower your blood pressure. If you are taking medicines for high blood pressure, these methods may reduce or end your need for medicines in the future.    Start a weight-loss program if you are overweight.    Cut back on how much salt you get in your diet. Here s how to do this:  ? Don t eat foods that have a lot of salt. These include olives, pickles, smoked meats, and salted potato chips.  ? Don t add salt to your food at the table.  ? Use only small amounts of salt when cooking.    Start an exercise program. Talk with your healthcare provider about the type of exercise program that would be best for you. It doesn't have to be hard. Even brisk walking for 20 minutes 3 times a week is a good form of  exercise.    Don t take medicines that stimulate the heart. This includes many over-the-counter cold and sinus decongestant pills and sprays, as well as diet pills. Check the warnings about high blood pressure on the label. Before buying any over-the-counter medicines or supplements, always ask the pharmacist about the product's possible interaction with your high blood pressure and your high blood pressure medicines.    Stimulants such as amphetamine or cocaine could be deadly for someone with high blood pressure. Never take these.    Limit how much caffeine you get in your diet. Switch to caffeine-free products.    Stop smoking. If you are a long-time smoker, this can be hard. Talk with your healthcare provider about medicines and nicotine replacement options to help you. Also join a stop-smoking program . This makes it more likely that you will quit for good.    Learn how to handle stress. This is an important part of any program to lower blood pressure. Learn about relaxation methods such as meditation, yoga, or biofeedback.    If your provider prescribed medicines, take them exactly as directed. Missing doses may cause your blood pressure get out of control.    If you miss a dose, check with your healthcare provider or pharmacist about what to do.    Think about buying an automatic blood pressure machine to check your blood pressure at home. Ask your provider for a recommendation. You can get one of these at most pharmacies.  Using a home blood pressure monitor  The American [employer] Association advises the following guidelines for home blood pressure monitoring:    Don't smoke or drink coffee for 30 minutes before taking your blood pressure.    Go to the bathroom before the test.    Relax for 5 minutes before taking the measurement.    Sit with your back supported (don't sit on a couch or soft chair). Keep your feet on the floor uncrossed. Place your arm on a solid flat surface (such as a table) with the upper  part of the arm at heart level. Place the middle of the cuff directly above the bend of the elbow. Check the monitor's instruction manual for an illustration.    Take multiple readings. When you measure, take 2 to 3 readings one minute apart and record all of the results.    Take your blood pressure at the same time every day, or as your healthcare provider advises.    Record the date, time, and blood pressure reading.    Take the record with you to your next healthcare appointment. If your blood pressure monitor has a built-in memory, just take the monitor with you to your next appointment.    Call your provider if you have several high readings. Don't be frightened by one high blood pressure reading. But if you get a few high readings, check in with your healthcare provider.  Follow-up care  You will need to see your healthcare provider regularly. This is to check your blood pressure and to make changes to your medicines. Make a follow-up appointment as directed. Bring the record of your home blood pressure readings to the appointment.  Call 911  Call 911 if you have any of these:    Blood pressure of 180/120 or higher    Chest pain or shortness of breath    Weakness of an arm or leg or one side of the face    Problems speaking or seeing     When to get medical advice  Call your healthcare provider right away if any of these occur:    Severe headache    Throbbing or rushing sound in the ears    Nosebleed    Sudden severe pain in your belly (abdomen)    Extreme drowsiness, confusion, or fainting    Dizziness or spinning feeling (vertigo)  [employer] last reviewed this educational content on 7/1/2019 2000-2021 [employer]. All rights reserved. This information is not intended as a substitute for professional medical care. Always follow your healthcare professional's instructions.

## 2022-03-28 ENCOUNTER — TELEPHONE (OUTPATIENT)
Dept: FAMILY MEDICINE | Facility: CLINIC | Age: 62
End: 2022-03-28
Payer: COMMERCIAL

## 2022-03-28 NOTE — TELEPHONE ENCOUNTER
Form received for appeal from [employer]. I tried to call and get Fax number I was not able to through the automatic system and it would not let me talk to a person. I called patient and he is up [place] so I told him I am mailing him a copy. That I am mailing [employer] the forms to that I am not able to get a fax number.   Put copy in scanning and in basket.    Brandi Bailey PSC on 3/28/2022 at 8:41 AM

## 2022-07-18 ENCOUNTER — HOSPITAL ENCOUNTER (EMERGENCY)
Facility: CLINIC | Age: 62
Discharge: HOME | End: 2022-07-18
Attending: PHYSICIAN ASSISTANT | Admitting: PHYSICIAN ASSISTANT
Payer: COMMERCIAL

## 2022-07-18 ENCOUNTER — APPOINTMENT (OUTPATIENT)
Dept: CT IMAGING | Facility: CLINIC | Age: 62
End: 2022-07-18
Attending: PHYSICIAN ASSISTANT
Payer: COMMERCIAL

## 2022-07-18 VITALS
HEIGHT: 69 IN | DIASTOLIC BLOOD PRESSURE: 86 MMHG | TEMPERATURE: 98.7 F | HEART RATE: 97 BPM | SYSTOLIC BLOOD PRESSURE: 151 MMHG | WEIGHT: 195 LBS | RESPIRATION RATE: 18 BRPM | OXYGEN SATURATION: 95 % | BODY MASS INDEX: 28.88 KG/M2

## 2022-07-18 DIAGNOSIS — J90 PLEURAL EFFUSION: ICD-10-CM

## 2022-07-18 DIAGNOSIS — S22.41XA CLOSED FRACTURE OF MULTIPLE RIBS OF RIGHT SIDE, INITIAL ENCOUNTER: ICD-10-CM

## 2022-07-18 LAB
ALBUMIN SERPL-MCNC: 4 G/DL (ref 3.4–5)
ALP SERPL-CCNC: 65 U/L (ref 40–150)
ALT SERPL W P-5'-P-CCNC: 39 U/L (ref 0–70)
ANION GAP SERPL CALCULATED.3IONS-SCNC: 8 MMOL/L (ref 3–14)
AST SERPL W P-5'-P-CCNC: 16 U/L (ref 0–45)
BASOPHILS # BLD AUTO: 0.1 10E3/UL (ref 0–0.2)
BASOPHILS NFR BLD AUTO: 1 %
BILIRUB SERPL-MCNC: 1.1 MG/DL (ref 0.2–1.3)
BUN SERPL-MCNC: 15 MG/DL (ref 7–30)
CALCIUM SERPL-MCNC: 9.7 MG/DL (ref 8.5–10.1)
CHLORIDE BLD-SCNC: 106 MMOL/L (ref 94–109)
CO2 SERPL-SCNC: 29 MMOL/L (ref 20–32)
CREAT SERPL-MCNC: 0.75 MG/DL (ref 0.66–1.25)
EOSINOPHIL # BLD AUTO: 0.1 10E3/UL (ref 0–0.7)
EOSINOPHIL NFR BLD AUTO: 2 %
ERYTHROCYTE [DISTWIDTH] IN BLOOD BY AUTOMATED COUNT: 13 % (ref 10–15)
GFR SERPL CREATININE-BSD FRML MDRD: >90 ML/MIN/1.73M2
GLUCOSE BLD-MCNC: 103 MG/DL (ref 70–99)
HCT VFR BLD AUTO: 42.3 % (ref 40–53)
HGB BLD-MCNC: 14.2 G/DL (ref 13.3–17.7)
HOLD SPECIMEN: NORMAL
HOLD SPECIMEN: NORMAL
IMM GRANULOCYTES # BLD: 0 10E3/UL
IMM GRANULOCYTES NFR BLD: 0 %
LYMPHOCYTES # BLD AUTO: 1.4 10E3/UL (ref 0.8–5.3)
LYMPHOCYTES NFR BLD AUTO: 16 %
MCH RBC QN AUTO: 30.7 PG (ref 26.5–33)
MCHC RBC AUTO-ENTMCNC: 33.6 G/DL (ref 31.5–36.5)
MCV RBC AUTO: 91 FL (ref 78–100)
MONOCYTES # BLD AUTO: 0.8 10E3/UL (ref 0–1.3)
MONOCYTES NFR BLD AUTO: 9 %
NEUTROPHILS # BLD AUTO: 6.3 10E3/UL (ref 1.6–8.3)
NEUTROPHILS NFR BLD AUTO: 72 %
NRBC # BLD AUTO: 0 10E3/UL
NRBC BLD AUTO-RTO: 0 /100
PLATELET # BLD AUTO: 268 10E3/UL (ref 150–450)
POTASSIUM BLD-SCNC: 3.6 MMOL/L (ref 3.4–5.3)
PROT SERPL-MCNC: 8 G/DL (ref 6.8–8.8)
RBC # BLD AUTO: 4.63 10E6/UL (ref 4.4–5.9)
SODIUM SERPL-SCNC: 143 MMOL/L (ref 133–144)
WBC # BLD AUTO: 8.7 10E3/UL (ref 4–11)

## 2022-07-18 PROCEDURE — 99285 EMERGENCY DEPT VISIT HI MDM: CPT | Performed by: PHYSICIAN ASSISTANT

## 2022-07-18 PROCEDURE — 74177 CT ABD & PELVIS W/CONTRAST: CPT

## 2022-07-18 PROCEDURE — 85025 COMPLETE CBC W/AUTO DIFF WBC: CPT | Performed by: PHYSICIAN ASSISTANT

## 2022-07-18 PROCEDURE — 250N000011 HC RX IP 250 OP 636: Performed by: PHYSICIAN ASSISTANT

## 2022-07-18 PROCEDURE — 99285 EMERGENCY DEPT VISIT HI MDM: CPT | Mod: 25 | Performed by: PHYSICIAN ASSISTANT

## 2022-07-18 PROCEDURE — 96374 THER/PROPH/DIAG INJ IV PUSH: CPT | Mod: 59 | Performed by: PHYSICIAN ASSISTANT

## 2022-07-18 PROCEDURE — 70450 CT HEAD/BRAIN W/O DYE: CPT

## 2022-07-18 PROCEDURE — 80053 COMPREHEN METABOLIC PANEL: CPT | Performed by: PHYSICIAN ASSISTANT

## 2022-07-18 PROCEDURE — 36415 COLL VENOUS BLD VENIPUNCTURE: CPT | Performed by: PHYSICIAN ASSISTANT

## 2022-07-18 PROCEDURE — 250N000009 HC RX 250: Performed by: PHYSICIAN ASSISTANT

## 2022-07-18 RX ORDER — IOPAMIDOL 755 MG/ML
95 INJECTION, SOLUTION INTRAVASCULAR ONCE
Status: DISCONTINUED | OUTPATIENT
Start: 2022-07-18 | End: 2022-07-18 | Stop reason: CLARIF

## 2022-07-18 RX ORDER — IOPAMIDOL 755 MG/ML
99 INJECTION, SOLUTION INTRAVASCULAR ONCE
Status: DISCONTINUED | OUTPATIENT
Start: 2022-07-18 | End: 2022-07-18 | Stop reason: CLARIF

## 2022-07-18 RX ORDER — IOPAMIDOL 755 MG/ML
86 INJECTION, SOLUTION INTRAVASCULAR ONCE
Status: COMPLETED | OUTPATIENT
Start: 2022-07-18 | End: 2022-07-18

## 2022-07-18 RX ORDER — HYDROMORPHONE HYDROCHLORIDE 1 MG/ML
0.5 INJECTION, SOLUTION INTRAMUSCULAR; INTRAVENOUS; SUBCUTANEOUS
Status: COMPLETED | OUTPATIENT
Start: 2022-07-18 | End: 2022-07-18

## 2022-07-18 RX ORDER — LIDOCAINE 4 G/G
PATCH TOPICAL
Qty: 7 PATCH | Refills: 0 | Status: SHIPPED | OUTPATIENT
Start: 2022-07-18 | End: 2023-03-29

## 2022-07-18 RX ORDER — OXYCODONE HYDROCHLORIDE 5 MG/1
5 TABLET ORAL EVERY 6 HOURS PRN
Qty: 12 TABLET | Refills: 0 | Status: SHIPPED | OUTPATIENT
Start: 2022-07-18 | End: 2023-03-29

## 2022-07-18 RX ADMIN — HYDROMORPHONE HYDROCHLORIDE 0.5 MG: 1 INJECTION, SOLUTION INTRAMUSCULAR; INTRAVENOUS; SUBCUTANEOUS at 15:44

## 2022-07-18 RX ADMIN — SODIUM CHLORIDE 64 ML: 9 INJECTION, SOLUTION INTRAVENOUS at 16:19

## 2022-07-18 RX ADMIN — IOPAMIDOL 86 ML: 755 INJECTION, SOLUTION INTRAVENOUS at 16:19

## 2022-07-18 ASSESSMENT — ENCOUNTER SYMPTOMS
SHORTNESS OF BREATH: 0
HEADACHES: 0
COUGH: 0
NEUROLOGICAL NEGATIVE: 1
ABDOMINAL PAIN: 1
FEVER: 0
VOMITING: 0
CONSTITUTIONAL NEGATIVE: 1
NAUSEA: 0
RESPIRATORY NEGATIVE: 1
CONSTIPATION: 1

## 2022-07-18 NOTE — ED PROVIDER NOTES
"  History     Chief Complaint   Patient presents with     Rib Pain     HPI  Steven Buck is a 61 year old male who presents with complaints of right-sided rib and chest wall pain since he slipped and fell 3 days ago.  [place] 3 days ago and fell, landing [place] the adjacent [place] with [place].  Patient does not believe he hit his head or lost consciousness but does have a bump to the back of his head.  He states he called 911 and was evaluated by EMS as he was alone.  They told him that he \"probably had broken ribs\" but he was not transported for treatment.  Patient states his wife picked him up today and drove him back home and into the ER for evaluation.  He states he has had persistent severe right-sided rib and chest wall pain since the fall 3 days ago.  He sustained a large bruise and abrasion to the right side of his chest and flank region.  Patient is concerned as he has not had a bowel movement since the fall.  His abdomen has become more tender.  No vomiting or headaches.  He denies associated shortness of breath.  He has been treating his symptoms at home with ibuprofen.  Denies fevers, chills, cough, nausea, vomiting, or hematuria.  No lower extremity injury.  Patient has been ambulating without difficulties.  He does not take blood thinners.      Allergies:  Allergies   Allergen Reactions     Nkda [No Known Drug Allergies]        Problem List:    Patient Active Problem List    Diagnosis Date Noted     History of Clostridium difficile infection 03/08/2022     Priority: Medium     Hypertension goal BP (blood pressure) < 140/90 08/19/2013     Priority: Medium     Hyperlipidemia LDL goal <100 03/08/2013     Priority: Medium        Past Medical History:    Past Medical History:   Diagnosis Date     Hypertension        Past Surgical History:    Past Surgical History:   Procedure Laterality Date     " "ORTHOPEDIC SURGERY       REPAIR TENDON BICEPS DISTAL UPPER EXTREMITY  2014    Procedure: REPAIR TENDON BICEPS DISTAL UPPER EXTREMITY;  Surgeon: Ryan Joe MD;  Location: [facility]     SURGICAL HISTORY OF -       RIGHT knee arthroscopic surgery     SURGICAL HISTORY OF -   97    vasectomy       Family History:    Family History   Problem Relation Age of Onset     Hypertension Mother      Hypertension Father      C.A.D. Father      Cardiovascular Father      Hypertension Maternal Grandmother      Hypertension Maternal Grandfather      Hypertension Brother        Social History:  Marital Status:   [2]  Social History     Tobacco Use     Smoking status: Former Smoker     Years: 1.00     Types: Cigarettes     Quit date: 1986     Years since quittin.5     Smokeless tobacco: Never Used   Vaping Use     Vaping Use: Never used   Substance Use Topics     Alcohol use: Yes     Alcohol/week: 0.0 standard drinks     Comment: a few drinks now and then     Drug use: No        Medications:    Lidocaine (LIDOCARE) 4 % Patch  oxyCODONE (ROXICODONE) 5 MG tablet  amLODIPine (NORVASC) 10 MG tablet  atorvastatin (LIPITOR) 20 MG tablet  lisinopril-hydrochlorothiazide (ZESTORETIC) 20-25 MG tablet  Multiple Vitamins-Minerals (ONE-A-DAY 50 PLUS PO)  Omega-3 Fatty Acids (FISH OIL BURP-LESS PO)          Review of Systems   Constitutional: Negative.  Negative for fever.   Respiratory: Negative.  Negative for cough and shortness of breath.    Cardiovascular: Positive for chest pain.        Right-sided rib and chest wall pain   Gastrointestinal: Positive for abdominal pain and constipation. Negative for nausea and vomiting.   Skin:        Ecchymosis and abrasion to right chest wall and flank region   Neurological: Negative.  Negative for headaches.   All other systems reviewed and are negative.      Physical Exam   BP: (!) 149/83  Pulse: 59  Temp: 98.7  F (37.1  C)  Resp: 18  Height: 175.3 cm (5' 9\")  Weight: " 88.5 kg (195 lb)  SpO2: 96 %      Physical Exam  Constitutional:       General: He is not in acute distress.     Appearance: Normal appearance. He is well-developed. He is not ill-appearing, toxic-appearing or diaphoretic.      Comments: Patient is standing without any discomfort but has increased pain with changing positions   HENT:      Head: Normocephalic. Contusion present. No raccoon eyes, Mansfield's sign or abrasion.      Comments: Area of swelling to right posterior scalp/occipital region     Right Ear: External ear normal.      Left Ear: External ear normal.      Nose: Nose normal. No congestion or rhinorrhea.      Mouth/Throat:      Lips: Pink.      Mouth: Mucous membranes are moist.      Pharynx: Oropharynx is clear. Uvula midline. No pharyngeal swelling, oropharyngeal exudate or posterior oropharyngeal erythema.   Eyes:      Extraocular Movements: Extraocular movements intact.      Conjunctiva/sclera: Conjunctivae normal.      Pupils: Pupils are equal, round, and reactive to light.   Cardiovascular:      Rate and Rhythm: Normal rate and regular rhythm.      Heart sounds: Normal heart sounds.   Pulmonary:      Effort: Pulmonary effort is normal. No respiratory distress.      Breath sounds: Normal breath sounds. No stridor. No wheezing, rhonchi or rales.   Chest:      Chest wall: Tenderness present.      Comments: Tenderness to palpation along right lateral and posterior ribs with overlying ecchymosis and abrasion  Abdominal:      General: There is no distension.      Palpations: Abdomen is soft.      Tenderness: There is no abdominal tenderness. There is no guarding or rebound.       Musculoskeletal:         General: No swelling, tenderness, deformity or signs of injury. Normal range of motion.      Cervical back: Full passive range of motion without pain, normal range of motion and neck supple. No rigidity. No spinous process tenderness or muscular tenderness.        Back:    Lymphadenopathy:      Cervical:  No cervical adenopathy.   Skin:     General: Skin is warm and dry.   Neurological:      General: No focal deficit present.      Mental Status: He is alert and oriented to person, place, and time.      Cranial Nerves: No cranial nerve deficit.      Sensory: Sensation is intact.      Motor: Motor function is intact.      Coordination: Coordination is intact.      Gait: Gait is intact.   Psychiatric:         Mood and Affect: Mood normal.         Behavior: Behavior is cooperative.         ED Course                 Procedures      Results for orders placed or performed during the hospital encounter of 07/18/22 (from the past 24 hour(s))   CBC with platelets differential    Narrative    The following orders were created for panel order CBC with platelets differential.  Procedure                               Abnormality         Status                     ---------                               -----------         ------                     CBC with platelets and d...[[id number]]                      Final result                 Please view results for these tests on the individual orders.   Comprehensive metabolic panel   Result Value Ref Range    Sodium 143 133 - 144 mmol/L    Potassium 3.6 3.4 - 5.3 mmol/L    Chloride 106 94 - 109 mmol/L    Carbon Dioxide (CO2) 29 20 - 32 mmol/L    Anion Gap 8 3 - 14 mmol/L    Urea Nitrogen 15 7 - 30 mg/dL    Creatinine 0.75 0.66 - 1.25 mg/dL    Calcium 9.7 8.5 - 10.1 mg/dL    Glucose 103 (H) 70 - 99 mg/dL    Alkaline Phosphatase 65 40 - 150 U/L    AST 16 0 - 45 U/L    ALT 39 0 - 70 U/L    Protein Total 8.0 6.8 - 8.8 g/dL    Albumin 4.0 3.4 - 5.0 g/dL    Bilirubin Total 1.1 0.2 - 1.3 mg/dL    GFR Estimate >90 >60 mL/min/1.73m2   [place] Draw    Narrative    The following orders were created for panel order [place] Draw.  Procedure                               Abnormality         Status                     ---------                               -----------         ------                      Extra Blue Top Tube[[id number]]                              Final result               Extra Red Top Tube[[id number]]                               Final result                 Please view results for these tests on the individual orders.   CBC with platelets and differential   Result Value Ref Range    WBC Count 8.7 4.0 - 11.0 10e3/uL    RBC Count 4.63 4.40 - 5.90 10e6/uL    Hemoglobin 14.2 13.3 - 17.7 g/dL    Hematocrit 42.3 40.0 - 53.0 %    MCV 91 78 - 100 fL    MCH 30.7 26.5 - 33.0 pg    MCHC 33.6 31.5 - 36.5 g/dL    RDW 13.0 10.0 - 15.0 %    Platelet Count 268 150 - 450 10e3/uL    % Neutrophils 72 %    % Lymphocytes 16 %    % Monocytes 9 %    % Eosinophils 2 %    % Basophils 1 %    % Immature Granulocytes 0 %    NRBCs per 100 WBC 0 <1 /100    Absolute Neutrophils 6.3 1.6 - 8.3 10e3/uL    Absolute Lymphocytes 1.4 0.8 - 5.3 10e3/uL    Absolute Monocytes 0.8 0.0 - 1.3 10e3/uL    Absolute Eosinophils 0.1 0.0 - 0.7 10e3/uL    Absolute Basophils 0.1 0.0 - 0.2 10e3/uL    Absolute Immature Granulocytes 0.0 <=0.4 10e3/uL    Absolute NRBCs 0.0 10e3/uL   Extra Blue Top Tube   Result Value Ref Range    Hold Specimen JIC    Extra Red Top Tube   Result Value Ref Range    Hold Specimen JIC    Head CT w/o contrast    Narrative    CT SCAN OF THE HEAD WITHOUT CONTRAST   7/18/2022 4:36 PM     HISTORY: Fall off dock three days ago, swelling to posterior scalp, no  known loss of consciousness.    TECHNIQUE: Axial images of the head and coronal reformations without  IV contrast material. Radiation dose for this scan was reduced using  automated exposure control, adjustment of the mA and/or kV according  to patient size, or iterative reconstruction technique.    COMPARISON: None.    FINDINGS: Mild volume loss is present. White matter hypoattenuation  likely represents mild chronic small vessel ischemic change. The  cerebral hemispheres, brainstem, and cerebellum otherwise demonstrate  normal morphology and attenuation. No evidence  of acute ischemia,  hemorrhage, mass, mass effect or hydrocephalus. The visualized  calvarium, tympanic cavities, mastoid cavities, and paranasal sinuses  are unremarkable.      Impression    IMPRESSION: No acute intracranial abnormality.    MIGUEL CLARKE MD         SYSTEM ID:  [id number]   CT Chest/Abdomen/Pelvis w Contrast    Narrative    CT CHEST/ABDOMEN/PELVIS W CONTRAST 7/18/2022 4:37 PM    CLINICAL HISTORY: trauma 3 days ago, fell off pontoon against dock,  large ecchymosis to right flank and lower chest wall and into abdomen,  right rib pain and right-sided abdominal pain    TECHNIQUE: CT scan of the chest, abdomen, and pelvis was performed  following injection of IV contrast. Multiplanar reformats were  obtained. Dose reduction techniques were used.   CONTRAST: 86 ml Isovue 370    COMPARISON: CT 10/16/2020    FINDINGS:   LUNGS AND PLEURA: There is no evidence of laceration or contusion.  There is a small right pleural effusion. No pneumothorax.    MEDIASTINUM/AXILLAE: No evidence of acute injury. No suspicious  lymphadenopathy.    CORONARY ARTERY CALCIFICATION: Severe.    HEPATOBILIARY: Normal.    PANCREAS: Normal.    SPLEEN: Normal.    ADRENAL GLANDS: Normal.    KIDNEYS/BLADDER: No hydronephrosis. Urinary bladder is unremarkable.    BOWEL: No obstruction or inflammatory change. No evidence of  mesenteric hematoma or pneumoperitoneum.    PELVIC ORGANS: Normal.    ADDITIONAL FINDINGS: Scattered calcified atherosclerosis.    MUSCULOSKELETAL: There are displaced fractures of the posterior right  9th, 10th and 11th ribs as well as anterior fractures of the 7th, 8th,  9th and 10th ribs.  There is surrounding stranding in the right chest  wall and flank, which likely corresponds to known ecchymosis. Severe  degenerative changes in the left hip and shoulders.      Impression    IMPRESSION:  1.  Displaced fractures of the right 7th through 11th ribs, 9th and  10th are segmental.   2.  Small right pleural effusion.  "No pneumothorax.  3.  Subcutaneous contusion in the right chest wall and flank.  4.  No evidence of acute trauma in the abdomen or pelvis.    MANDI ALVAREZ MD         SYSTEM ID:  [id number]       Medications   HYDROmorphone (PF) (DILAUDID) injection 0.5 mg (0.5 mg Intravenous Given 7/18/22 1544)   iopamidol (ISOVUE-370) solution 86 mL (86 mLs Intravenous Given 7/18/22 1619)   sodium chloride 0.9 % bag 500mL for CT scan flush use (64 mLs As instructed Given 7/18/22 1619)       Assessments & Plan (with Medical Decision Making)     Pt is a 61 year old male who presents with complaints of right-sided rib and chest wall pain since he slipped and fell 3 days ago.  [place] 3 days ago and fell, [place] adjacent [place] with [place].  Patient does not believe he hit his head or lost consciousness but does have a bump to the back of his head.  He states he called 911 and was evaluated by EMS as he was alone.  They told him that he \"probably had broken ribs\" but he was not transported for treatment.  Patient states his wife picked him up today and drove him back home and into the ER for evaluation.  He states he has had persistent severe right-sided rib and chest wall pain since the fall 3 days ago.  He sustained a large bruise and abrasion to the right side of his chest and flank region.  He does not take blood thinners.     Pt is afebrile on arrival.  Exam as above.  Patient is not hypoxic.  He is in no respiratory distress.  Patient has tenderness to palpation along the right lateral and posterior ribs with overlying ecchymosis and abrasion that extends into his right flank and abdomen.  Comprehensive metabolic panel is unremarkable with normal electrolytes, kidney function, and liver function tests.  Normal hemoglobin and white count on CBC.  CT of the chest/abdomen/pelvis shows displaced fractures of the right " seventh through 11th ribs with ninth and 10th ribs being segmental fractures.  There is a small right pleural effusion.  No pneumothorax.  Subcutaneous contusion noted.  No acute trauma noted to the abdomen or pelvis.  Head CT was negative for intracranial abnormality.  Discussed results with patient.  Recommended he be admitted to the hospital given his numerous rib fractures, 2 of which are segmental (his injury was 3 days ago).  Patient declines admission.  He states he lives with his wife and they are retired and he will monitor his symptoms and return should anything change.  I again stressed the recommendation and importance of admission and the potential complications that can develop from numerous rib fractures in his case.  He expresses understanding of this and continues to decline admission.  He was sent with an incentive spirometer.  Get well loop referral for rib fractures was placed.  We will send with pain medications.  He initially declined anything for pain here but was subsequently given Dilaudid with improvement in pain.  Follow-up appointment in the clinic was made for him for this week.  Stressed the importance of returning to the emergency department should he develop any shortness of breath, fevers, worsening pain, or any other symptoms of concern.  Return precautions were reviewed.  Hand-outs were provided.    Patient was sent with oxycodone and lidocaine patches and was instructed to follow-up with PCP as scheduled this week for recheck and for continued care and management or sooner if new or worsening symptoms.  He is to return to the ED for persistent and/or worsening symptoms.  Patient expressed understanding of the diagnosis and plan and was discharged home in good condition.    I have reviewed the nursing notes.    I have reviewed the findings, diagnosis, plan and need for follow up with the patient.    Discharge Medication List as of 7/18/2022  5:45 PM      START taking these  medications    Details   Lidocaine (LIDOCARE) 4 % Patch 1 patch to affected area for 12 hours, then remove. There should be a 12 hour patch free time per day.Disp-7 patch, [id number]      oxyCODONE (ROXICODONE) 5 MG tablet Take 1 tablet (5 mg) by mouth every 6 hours as needed for severe pain, Disp-12 tablet, R-0, Local Print             Final diagnoses:   Closed fracture of multiple ribs of right side, initial encounter   Pleural effusion       7/18/2022   [facility] EMERGENCY DEPT      Disclaimer:  This note consists of symbols derived from keyboarding, dictation and/or voice recognition software.  As a result, there may be errors in the script that have gone undetected.  Please consider this when interpreting information found in this chart.     Aisha Guerra PA-C  07/18/22 [id number]

## 2022-07-18 NOTE — ED TRIAGE NOTES
Pt here with right side/rib pain after slipping on a pontoon lift on Friday. Pt called 911, EMS checked pt out they said he probably had broken ribs. Pt said the pain is unbearable rating 10/10. Large bruise noted to right side rib area. Pt also concerned because he has not had a bowel movement since then and he usually goes daily.      Triage Assessment     Row Name 07/18/22 9579       Triage Assessment (Adult)    Airway WDL WDL       Respiratory WDL    Respiratory WDL WDL       Skin Circulation/Temperature WDL    Skin Circulation/Temperature WDL X  large bruise right side       Cardiac WDL    Cardiac WDL X;rhythm    Cardiac Rhythm tachycardic       Cognitive/Neuro/Behavioral WDL    Cognitive/Neuro/Behavioral WDL WDL

## 2022-07-18 NOTE — DISCHARGE INSTRUCTIONS
[employer] Enrollment  After you get back home, we'd like to check on you through a program called [employer]. Look for a text or email inviting you to activate your free account (unless you're already active in [employer]).   Learn more at [email or web address]. If you haven't received your invitation to sign up, and it's been more than 24 hours since leaving the hospital, please call [phone].                Using an Incentive Spirometer    An incentive spirometer is a device that helps you do deep breathing exercises after surgery. Or it helps lower the risk for breathing problems if you have a lung disease or condition. These exercises expand your lungs, aid in circulation, and help prevent pneumonia. Deep breathing exercises also help you breathe better and improve the function of your lungs by:  Keeping your lungs clear  Strengthening your breathing muscles  Helping prevent respiratory complications or problems  The incentive spirometer gives you a way to take an active part in your recovery. A nurse or respiratory therapist will teach you breathing exercises. To do these exercises, you will breathe in through your mouth and not your nose. The incentive spirometer only works correctly if you breathe in through your mouth.  Your healthcare provider or his/her staff will tell you how to use the device, your targeted volume(s), and provide other helpful tips to prevent complications (such as pain, dizziness, feeling lightheaded) when blowing in the incentive spirometer.  Steps to clear lungs  Step 1. Exhale normally. Then, inhale normally.  Relax and breathe out.  Step 2. Place your lips tightly around the mouthpiece.  Make sure the device is upright and not tilted.  Sit up and breathe out (exhale) fully  Tightly seal your lips around the mouthpiece  Step 3. Inhale as much air as you can through the mouthpiece. Don't breathe through your nose.  Breathe in (inhale) slowly and deeply.  Hold your  breath long enough to keep the balls, piston, or disk raised for at least 3 to 5 seconds, or as instructed by your healthcare provider.  Exhale slowly to allow the balls, piston, or disk to fall before repeating again.  Note: Some spirometers have an indicator to let you know that you are breathing in too fast. If the indicator goes off, breathe in more slowly.  Step 4. Repeat the exercise regularly.  Do sets of 10 exercises every hour while you're awake, or as instructed by your healthcare provider. Don't do more than 30 breaths in each set.  If you were taught deep breathing and coughing exercises, do them regularly as instructed by your healthcare provider, nurse, or respiratory therapist.     Follow-up care  Make a follow up appointment, or as directed by your healthcare provider. Also, follow up with your healthcare provider as advised or if your symptoms don't improve or continue to get worse.  When to call your healthcare provider  Call your healthcare provider right away if you have any of the following:  Fever 100.4  (38 C) or higher, or as directed by your healthcare provider  Brownish, bloody, or smelly sputum (phlegm that you cough up)  Call 911  Call 911 if any of these occur:   Shortness of breath that doesn't get better after taking your medicine  Cool, moist, pale or blue skin  Trouble breathing or swallowing, wheezing  Fainting or loss of consciousness  Feeling of dizziness or weakness, or a sudden drop in blood pressure  Feeling very ill  Lightheadedness  Chest pain or rapid heart rate  [employer] last reviewed this educational content on 6/1/2019 2000-2021 [employer]. All rights reserved. This information is not intended as a substitute for professional medical care. Always follow your healthcare professional's instructions.            Rib Fracture    You broke one or more ribs. This is called a rib fracture. Rib fractures don't need a cast like other bones. They will heal by  themselves in about 4 to 6 weeks. The first 3 to 4 weeks will be the most painful. During this time deep breathing, coughing, or changing position from sitting to lying down, may cause the broken ends to move slightly.   Home care  Rest. You should not be doing any heavy lifting or strenuous exertion until the pain goes away.  It hurts to breathe when you have a broken rib. This puts you at risk of getting pneumonia from poor airflow through your lungs. To prevent this:  Take several very deep breaths once an hour while you're awake. Breathe out through pursed lips as if you are blowing up a balloon. If possible, actually blow up a balloon or a rubber glove. This exercise builds up pressure inside the lung and prevents collapse of the small air sacs of the lung. This exercise may cause some pain at the site of injury. This is normal.  You may have gotten a breathing exercise device called an incentive spirometer. Use it at least 4 times a day, or as directed.  Apply an ice pack over the injured area for 15 to 20 minutes every 1 to 2 hours. You should do this for the first 24 to 48 hours. To make an ice pack, put ice cubes in a plastic bag that seals at the top. Wrap the bag in a clean, thin towel or cloth. Never put ice or an ice pack directly on the skin. Keep using ice packs as needed for the relief of pain and swelling.  You may use over-the-counter pain medicine to control pain, unless another pain medicine was prescribed. If you have chronic liver or kidney disease or ever had a stomach ulcer or GI (gastrointestinal) bleeding, talk with your healthcare provider before using these medicines.  If your pain is not controlled, contact your healthcare provider. Sometimes a stronger pain medicine may be needed. A nerve block can be done in case of severe pain. It will numb the nerve between the ribs.    Follow-up care  Follow up with your healthcare provider, or as advised. In rare cases, a broken rib will cause  complications in the first few days that may not be clearly seen during your initial exam. This can include collapsed lung, bleeding around the lung or into the belly (abdomen), or pneumonia. So watch for the signs below.   If X-rays were taken, you will be told of any new findings that may affect your care.  Call 911  Call 911 if you have:   Dizziness, weakness or fainting  Shortness of breath with or without chest discomfort  New or worsening abdominal pain  Discomfort in other areas of your upper body such as your shoulders, jaw, neck, or arms  When to seek medical advice  Call your healthcare provider right away if any of these occur:  Increasing chest pain with breathing  Fever of 100.4 F (38 C) or above, or as directed by your healthcare provider  Congested cough, nausea, or vomiting  [employer] last reviewed this educational content on 4/1/2018 2000-2021 [employer]. All rights reserved. This information is not intended as a substitute for professional medical care. Always follow your healthcare professional's instructions.

## 2022-07-21 ENCOUNTER — OFFICE VISIT (OUTPATIENT)
Dept: FAMILY MEDICINE | Facility: CLINIC | Age: 62
End: 2022-07-21
Payer: COMMERCIAL

## 2022-07-21 VITALS
RESPIRATION RATE: 16 BRPM | DIASTOLIC BLOOD PRESSURE: 78 MMHG | HEART RATE: 82 BPM | BODY MASS INDEX: 29.47 KG/M2 | HEIGHT: 69 IN | OXYGEN SATURATION: 96 % | SYSTOLIC BLOOD PRESSURE: 114 MMHG | WEIGHT: 199 LBS

## 2022-07-21 DIAGNOSIS — S22.41XD CLOSED FRACTURE OF MULTIPLE RIBS OF RIGHT SIDE WITH ROUTINE HEALING, SUBSEQUENT ENCOUNTER: Primary | ICD-10-CM

## 2022-07-21 PROCEDURE — 99213 OFFICE O/P EST LOW 20 MIN: CPT | Performed by: NURSE PRACTITIONER

## 2022-07-21 RX ORDER — OXYCODONE HYDROCHLORIDE 5 MG/1
5 TABLET ORAL EVERY 6 HOURS PRN
Qty: 20 TABLET | Refills: 0 | Status: SHIPPED | OUTPATIENT
Start: 2022-07-21 | End: 2023-03-29

## 2022-07-21 ASSESSMENT — PAIN SCALES - GENERAL: PAINLEVEL: NO PAIN (0)

## 2022-07-21 NOTE — PROGRESS NOTES
"  Assessment & Plan     Closed fracture of multiple ribs of right side with routine healing, subsequent encounter  Patient reports that he is doing well.  He is vitally stable today, lung sounds are clear bilaterally.  Pain has been well controlled with oxycodone, and he is beginning to wean himself off pain medication.  We discussed scheduled acetaminophen and ibuprofen.  I did write him a new prescription for oxycodone should he need it.  He will continue to use lidocaine patches as needed.  He will continue to use incentive spirometry for the next several weeks.  He will follow-up with me if things are not improving.  We did discuss reasons that should prompt emergent evaluation.  - oxyCODONE (ROXICODONE) 5 MG tablet; Take 1 tablet (5 mg) by mouth every 6 hours as needed for pain    [id number]}     BMI:   Estimated body mass index is 29.39 kg/m  as calculated from the following:    Height as of this encounter: 1.753 m (5' 9\").    Weight as of this encounter: 90.3 kg (199 lb).       Patient Instructions   Continue spirometer.  I will send more pain meds, can use plain Tylenol and ibuprofen.  Let me know if things are not improving.  Narcotics Discharge Instructions: (Roxicodone, Dilaudid, Ultram)  You have been prescribed a narcotic pain medication that has risk for addiction with prolonged use, so please use sparingly.  Additionally, narcotics are medications that are sedating (will make you sleepy), so do not drive or operate machinery while taking this medication.  Avoid alcohol or other sedating medicines such as benzodiazepines while taking narcotics due to risk for increased sedation and difficulty breathing.      Narcotics will cause constipation.  If you need to take this medication please consider taking an over-the-counter stool softener and laxative, such as Senna Plus, to prevent constipation from developing.  Nausea is a side effect of narcotic use.  Possible additional side effects include vomiting, " itching, and dizziness/lightheadedness.        Return in 1 week (on 7/28/2022) for worsening or continued symptoms.    BRET Ramey CNP  [facility] VANNESA Stern is a 61 year old, presenting for the following health issues:  Hospital F/U      [facility]       Hospital Follow-up Visit:    Hospital/Nursing Home/IP Rehab Facility: [facility]  Date of Admission: 07/18/2022  Date of Discharge: 07/18/2022  Reason(s) for Admission: broken ribs ,patient fell     Was your hospitalization related to COVID-19? No   Problems taking medications regularly:  None  Medication changes since discharge: None  Problems adhering to non-medication therapy:  None    Summary of hospitalization:  [facility] discharge summary reviewed  Diagnostic Tests/Treatments reviewed.  Follow up needed: none  Other Healthcare Providers Involved in Patient s Care:         None  Update since discharge: improved. Post Medication Reconciliation Status:        Plan of care communicated with patient           Above [facility] reviewed. Additionally, patient presented to the emergency department on July 18 for evaluation of right lateral chest pain after a fall on July 15 from his pontoon left onto the adjacent dog landing on his right lateral chest wall.  He was evaluated by EMS, but chose not to transport at that time.  He had continuing worsening right-sided chest pain and subsequently presented to the emergency department here at [facility] for evaluation.  He was fairly stable.  CT of the chest abdomen and pelvis showed displaced fractures of right ribs 7 through 11 with segmental fractures of the ninth and 10th ribs.  He also had a small right pleural effusion without pneumothorax and subcutaneous contusion of the right chest wall and flank.  It was recommended that he be admitted to the hospital, however he declined this.  He was sent home with strict return precautions,  "incentive spirometry and pain medication.    Since discharge, he reports that he has been doing well.  Pain has been well managed with oxycodone.  He reports that he takes his last tablet this afternoon and then would like to try to see if he has continued need for pain medication.  He has not had any shortness of breath or new or worsening pain.  He has had no abdominal pain.  He is using his incentive spirometer multiple times daily and is able to get volumes of around 3000 mL.  Denies cough or fever.        Review of Systems   Constitutional, HEENT, cardiovascular, pulmonary, gi and gu systems are negative, except as otherwise noted.      Objective    /78 (BP Location: Right arm, Patient Position: Sitting, Cuff Size: Adult Regular)   Pulse 82   Resp 16   Ht 1.753 m (5' 9\")   Wt 90.3 kg (199 lb)   SpO2 96%   BMI 29.39 kg/m    Body mass index is 29.39 kg/m .  Physical Exam  Vitals and nursing note reviewed.   Constitutional:       Appearance: Normal appearance.   HENT:      Head: Normocephalic and atraumatic.      Mouth/Throat:      Mouth: Mucous membranes are moist.   Eyes:      Comments: Non-icteric   Cardiovascular:      Rate and Rhythm: Normal rate and regular rhythm.      Pulses: Normal pulses.      Heart sounds: Normal heart sounds, S1 normal and S2 normal. Heart sounds not distant. No murmur heard.    No friction rub. No gallop.   Pulmonary:      Effort: Pulmonary effort is normal.      Breath sounds: Normal breath sounds.   Chest:      Comments: Significant ecchymosis to the right lateral chest wall and right lower abdomen.  No crepitus.  Abdominal:      General: Abdomen is flat. Bowel sounds are normal.      Palpations: Abdomen is soft.      Tenderness: There is no abdominal tenderness. There is no right CVA tenderness, left CVA tenderness or guarding.   Musculoskeletal:      Cervical back: Neck supple.      Right lower leg: No edema.      Left lower leg: No edema.   Skin:     General: Skin is " warm and dry.      Capillary Refill: Capillary refill takes less than 2 seconds.   Neurological:      General: No focal deficit present.      Mental Status: He is alert and oriented to person, place, and time.   Psychiatric:         Mood and Affect: Mood normal.         Behavior: Behavior normal.         Thought Content: Thought content normal.         Judgment: Judgment normal.              .  ..

## 2022-07-21 NOTE — PATIENT INSTRUCTIONS
Continue spirometer.  I will send more pain meds, can use plain Tylenol and ibuprofen.  Let me know if things are not improving.  Narcotics Discharge Instructions: (Roxicodone, Dilaudid, Ultram)  You have been prescribed a narcotic pain medication that has risk for addiction with prolonged use, so please use sparingly.  Additionally, narcotics are medications that are sedating (will make you sleepy), so do not drive or operate machinery while taking this medication.  Avoid alcohol or other sedating medicines such as benzodiazepines while taking narcotics due to risk for increased sedation and difficulty breathing.      Narcotics will cause constipation.  If you need to take this medication please consider taking an over-the-counter stool softener and laxative, such as Senna Plus, to prevent constipation from developing.  Nausea is a side effect of narcotic use.  Possible additional side effects include vomiting, itching, and dizziness/lightheadedness.

## 2022-11-19 ENCOUNTER — HEALTH MAINTENANCE LETTER (OUTPATIENT)
Age: 62
End: 2022-11-19

## 2023-01-30 ENCOUNTER — E-VISIT (OUTPATIENT)
Dept: FAMILY MEDICINE | Facility: CLINIC | Age: 63
End: 2023-01-30
Payer: COMMERCIAL

## 2023-01-30 DIAGNOSIS — H92.02 EARACHE SYMPTOMS IN LEFT EAR: Primary | ICD-10-CM

## 2023-01-30 PROCEDURE — 99207 PR NON-BILLABLE SERV PER CHARTING: CPT | Performed by: NURSE PRACTITIONER

## 2023-01-31 ENCOUNTER — MYC MEDICAL ADVICE (OUTPATIENT)
Dept: FAMILY MEDICINE | Facility: CLINIC | Age: 63
End: 2023-01-31
Payer: COMMERCIAL

## 2023-01-31 NOTE — PATIENT INSTRUCTIONS
Thank you for choosing us for your care. I think an in-clinic visit would be best next steps based on your symptoms. Please schedule a clinic appointment; you won t be charged for this eVisit.      You can schedule an appointment right here in [facility], or call [phone]

## 2023-02-01 ENCOUNTER — HOSPITAL ENCOUNTER (EMERGENCY)
Facility: CLINIC | Age: 63
Discharge: HOME | End: 2023-02-01
Attending: PHYSICIAN ASSISTANT | Admitting: PHYSICIAN ASSISTANT
Payer: COMMERCIAL

## 2023-02-01 VITALS
HEART RATE: 103 BPM | DIASTOLIC BLOOD PRESSURE: 81 MMHG | TEMPERATURE: 100.3 F | RESPIRATION RATE: 20 BRPM | OXYGEN SATURATION: 98 % | SYSTOLIC BLOOD PRESSURE: 149 MMHG

## 2023-02-01 DIAGNOSIS — H60.12 CELLULITIS OF LEFT EAR: ICD-10-CM

## 2023-02-01 DIAGNOSIS — H60.392 INFECTIVE OTITIS EXTERNA, LEFT: ICD-10-CM

## 2023-02-01 DIAGNOSIS — H92.02 OTALGIA, LEFT: ICD-10-CM

## 2023-02-01 PROCEDURE — G0463 HOSPITAL OUTPT CLINIC VISIT: HCPCS | Performed by: PHYSICIAN ASSISTANT

## 2023-02-01 PROCEDURE — 99214 OFFICE O/P EST MOD 30 MIN: CPT | Performed by: PHYSICIAN ASSISTANT

## 2023-02-01 RX ORDER — OFLOXACIN 3 MG/ML
10 SOLUTION AURICULAR (OTIC) 2 TIMES DAILY
Qty: 7 ML | Refills: 0 | Status: SHIPPED | OUTPATIENT
Start: 2023-02-01 | End: 2023-02-08

## 2023-02-01 ASSESSMENT — ENCOUNTER SYMPTOMS
RESPIRATORY NEGATIVE: 1
VOICE CHANGE: 0
GASTROINTESTINAL NEGATIVE: 1
FACIAL SWELLING: 0
CARDIOVASCULAR NEGATIVE: 1
HEADACHES: 0
EYES NEGATIVE: 1
APPETITE CHANGE: 0
FEVER: 1
TROUBLE SWALLOWING: 0
ACTIVITY CHANGE: 0
MUSCULOSKELETAL NEGATIVE: 1
DIZZINESS: 0
SORE THROAT: 0

## 2023-02-01 NOTE — ED PROVIDER NOTES
History     Chief Complaint   Patient presents with     Otalgia     Patient presents today with left ear pain and discharge. Symptoms started Sunday night. Arrived to urgent care ambulatory       HPI    Steven Buck is a 62 year old male who presents with left ear pain, fullness, muffled hearing, and discharge for 3 day(s).   Severity: moderate   Additional symptoms include none.      History of recurrent otitis: no  Patient denies ear plugs, ear buds, swimming, or trauma.   He states drainage started last night and swelling to ear is getting worse.   Patient states no fevers other than slightly elevated temp at time of triage. Patient states pain radiating into left jaw today. Patient denies dysphonia, trismus, dysphagia, facial redness or swelling.     Problem list, Medication list, Allergies, and Medical/Social/Surgical histories reviewed in [facility] and updated as appropriate.    Allergies:  Allergies   Allergen Reactions     Nkda [No Known Drug Allergies]        Problem List:    Patient Active Problem List    Diagnosis Date Noted     History of Clostridium difficile infection 03/08/2022     Priority: Medium     Hypertension goal BP (blood pressure) < 140/90 08/19/2013     Priority: Medium     Hyperlipidemia LDL goal <100 03/08/2013     Priority: Medium        Past Medical History:    Past Medical History:   Diagnosis Date     Hypertension        Past Surgical History:    Past Surgical History:   Procedure Laterality Date     ORTHOPEDIC SURGERY       REPAIR TENDON BICEPS DISTAL UPPER EXTREMITY  6/27/2014    Procedure: REPAIR TENDON BICEPS DISTAL UPPER EXTREMITY;  Surgeon: Ryan Joe MD;  Location: [facility] OR     SURGICAL HISTORY OF -   1995    RIGHT knee arthroscopic surgery     SURGICAL HISTORY OF -   1/16/97    vasectomy       Family History:    Family History   Problem Relation Age of Onset     Hypertension Mother      Hypertension Father      C.A.D. Father      Cardiovascular Father       Hypertension Maternal Grandmother      Hypertension Maternal Grandfather      Hypertension Brother        Social History:  Marital Status:   [2]  Social History     Tobacco Use     Smoking status: Former     Years: 1.00     Types: Cigarettes     Quit date: 1986     Years since quittin.1     Smokeless tobacco: Never   Vaping Use     Vaping Use: Never used   Substance Use Topics     Alcohol use: Yes     Alcohol/week: 0.0 standard drinks     Comment: a few drinks now and then     Drug use: No        Medications:    amoxicillin-clavulanate (AUGMENTIN) 875-125 MG tablet  ofloxacin (FLOXIN) 0.3 % otic solution  amLODIPine (NORVASC) 10 MG tablet  atorvastatin (LIPITOR) 20 MG tablet  Lidocaine (LIDOCARE) 4 % Patch  lisinopril-hydrochlorothiazide (ZESTORETIC) 20-25 MG tablet  Multiple Vitamins-Minerals (ONE-A-DAY 50 PLUS PO)  Omega-3 Fatty Acids (FISH OIL BURP-LESS PO)  oxyCODONE (ROXICODONE) 5 MG tablet  oxyCODONE (ROXICODONE) 5 MG tablet          Review of Systems   Constitutional: Positive for fever. Negative for activity change and appetite change.   HENT: Positive for ear discharge and ear pain. Negative for facial swelling, sore throat, tinnitus, trouble swallowing and voice change.    Eyes: Negative.    Respiratory: Negative.    Cardiovascular: Negative.    Gastrointestinal: Negative.    Musculoskeletal: Negative.    Skin: Negative.    Neurological: Negative for dizziness and headaches.   All other systems reviewed and are negative.      Physical Exam   BP: (!) 149/81  Pulse: 103  Temp: 100.3  F (37.9  C)  Resp: 20  SpO2: 98 %      Physical Exam    BP (!) 149/81   Pulse 103   Temp 100.3  F (37.9  C) (Tympanic)   Resp 20   SpO2 98%    Right TM is normal: no effusions, no erythema, and normal landmarks     The Right auditory canal is normal and without drainage, edema or erythema  Left TM is unable to visualize TM due to ear canal swelling and drainage.   The Left auditory canal is erythematous,  tender, obstructed by drainage and swollen ear canal and left external ear  Oropharynx exam is normal: no lesions, erythema, adenopathy or exudate.  GENERAL: no acute distress  EYES: EOMI,  PERRL, conjunctiva clear  NECK: supple, non-tender to palpation, no adenopathy noted  RESP: lungs clear to auscultation - no rales, rhonchi or wheezes  SKIN: no suspicious lesions or rashes   CV: regular rates and rhythm, normal    No results found for this or any previous visit (from the past 24 hour(s)).      ED Course                 Procedures  Ear wick placed in left ear canal without issue           Critical Care time:  none               No results found for this or any previous visit (from the past 24 hour(s)).    Medications - No data to display    Assessments & Plan (with Medical Decision Making)     I have reviewed the nursing notes.    I have reviewed the findings, diagnosis, plan and need for follow up with the patient.    Steven Buck is a 62 year old male who presents with left ear pain, fullness, muffled hearing, and discharge for 3 day(s).   Severity: moderate   Additional symptoms include none.      History of recurrent otitis: no  Patient denies ear plugs, ear buds, swimming, or trauma.   He states drainage started last night and swelling to ear is getting worse.   Patient states no fevers other than slightly elevated temp at time of triage. Patient states pain radiating into left jaw today. Patient denies dysphonia, trismus, dysphagia, facial redness or swelling.     Exam findings consistent with otitis externa, cellulitis of the ear.  Cannot visualize TM due to drainage so cannot say if there is a ruptured TM or not.  We will treat with Vantin and Floxin otic eardrops.  Recommend ENT follow-up with primary care follow-up in 1 to 2 weeks.  Warm compresses, Tylenol, ibuprofen, ear wick placed in the left ear canal without complication.  Patient discharged in stable condition informed return if symptoms  worsen or change.  No facial cellulitis or deep tissue infection noted at this time.          Discharge Medication List as of 2/1/2023  2:00 PM      START taking these medications    Details   amoxicillin-clavulanate (AUGMENTIN) 875-125 MG tablet Take 1 tablet by mouth 2 times daily for 10 days, Disp-20 tablet, R-0, E-Prescribe      ofloxacin (FLOXIN) 0.3 % otic solution Place 10 drops Into the left ear 2 times daily for 7 days, Disp-7 mL, R-0, E-Prescribe             Final diagnoses:   Infective otitis externa, left   Cellulitis of left ear   Otalgia, left       2/1/2023   [facility] EMERGENCY DEPT

## 2023-02-01 NOTE — DISCHARGE INSTRUCTIONS
Use medications as directed.     Ear wick placed in left ear canal. This will fall out on its own in a few days.     Tylenol/ibuprofen over the counter as needed for pain as long as no allergies or contraindications.   Warm compress to ear.     Go to the emergency department if facial redness, swelling, worsening fevers, sore throat, difficulty swallowing, change in voice or changes worsening symptoms occur.    Use both oral and topical antibiotics.    Follow up with primary care provider for recheck in 1-2 weeks or with ENT  Return sooner if symptoms worsen/change.

## 2023-03-02 DIAGNOSIS — E78.5 HYPERLIPIDEMIA LDL GOAL <100: ICD-10-CM

## 2023-03-06 ENCOUNTER — MYC MEDICAL ADVICE (OUTPATIENT)
Dept: INTERNAL MEDICINE | Facility: CLINIC | Age: 63
End: 2023-03-06
Payer: COMMERCIAL

## 2023-03-06 RX ORDER — ATORVASTATIN CALCIUM 20 MG/1
TABLET, FILM COATED ORAL
Qty: 90 TABLET | Refills: 0 | Status: SHIPPED | OUTPATIENT
Start: 2023-03-06 | End: 2023-04-14

## 2023-03-12 DIAGNOSIS — I10 HYPERTENSION GOAL BP (BLOOD PRESSURE) < 140/90: ICD-10-CM

## 2023-03-13 NOTE — TELEPHONE ENCOUNTER
Pending Prescriptions:                       Disp   Refills    lisinopril-hydrochlorothiazide (ZESTORETIC*90 tab*3        Sig: TAKE 1 TABLET DAILY        Routing refill request to provider for review/approval because:  Blood pressure under 140/90 in past 12 months        BP Readings from Last 3 Encounters:   02/01/23 (!) 149/81   07/21/22 114/78   07/18/22 (!) 151/86     Last Written Prescription Date:  3/8/22  Last Fill Quantity: 90,  # refills: 3   Last office visit: 3/8/22 with prescribing provider. Most recently E-Visit on 1/30/23.  Has upcoming appt per below.   Future Office Visit:   Next 5 appointments (look out 90 days)      Apr 14, 2023  8:00 AM  (Arrive by 7:40 AM)  Adult Preventative Visit with Lety Barger NP  [facility] ([facility] [place] )  Arrive at: Clinic [facility] [facility] [address]  [phone]             Ramandeep Quintero RN

## 2023-03-14 RX ORDER — LISINOPRIL AND HYDROCHLOROTHIAZIDE 20; 25 MG/1; MG/1
1 TABLET ORAL DAILY
Qty: 90 TABLET | Refills: 0 | Status: SHIPPED | OUTPATIENT
Start: 2023-03-14 | End: 2023-04-14

## 2023-03-27 DIAGNOSIS — I10 HYPERTENSION GOAL BP (BLOOD PRESSURE) < 140/90: ICD-10-CM

## 2023-03-28 NOTE — TELEPHONE ENCOUNTER
Pending Prescriptions:                       Disp   Refills    amLODIPine (NORVASC) 10 MG tablet [Pharma*90 tab*3            Sig: TAKE 1 TABLET DAILY    Routing refill request to provider for review/approval because:  Labs not current:    BP Readings from Last 3 Encounters:   02/01/23 (!) 149/81   07/21/22 114/78   07/18/22 (!) 151/86         Pablo Elizabeth RN

## 2023-03-29 DIAGNOSIS — E78.5 HYPERLIPIDEMIA LDL GOAL <100: Primary | ICD-10-CM

## 2023-03-29 DIAGNOSIS — I10 HYPERTENSION GOAL BP (BLOOD PRESSURE) < 140/90: ICD-10-CM

## 2023-03-29 RX ORDER — AMLODIPINE BESYLATE 10 MG/1
10 TABLET ORAL DAILY
Qty: 90 TABLET | Refills: 0 | Status: SHIPPED | OUTPATIENT
Start: 2023-03-29 | End: 2023-04-14

## 2023-03-29 RX ORDER — AMLODIPINE BESYLATE 10 MG/1
TABLET ORAL
Qty: 90 TABLET | Refills: 3 | OUTPATIENT
Start: 2023-03-29

## 2023-03-29 NOTE — TELEPHONE ENCOUNTER
Patient already has an appointment scheduled on 4/14/2023. Denita is the one that prescribed it for the year on 3/8/2022    Brandi Bailey PSC on 3/29/2023 at 5:11 PM

## 2023-04-09 ENCOUNTER — HEALTH MAINTENANCE LETTER (OUTPATIENT)
Age: 63
End: 2023-04-09

## 2023-04-14 ENCOUNTER — OFFICE VISIT (OUTPATIENT)
Dept: FAMILY MEDICINE | Facility: CLINIC | Age: 63
End: 2023-04-14
Payer: COMMERCIAL

## 2023-04-14 VITALS
BODY MASS INDEX: 29.33 KG/M2 | DIASTOLIC BLOOD PRESSURE: 72 MMHG | WEIGHT: 198 LBS | HEIGHT: 69 IN | SYSTOLIC BLOOD PRESSURE: 136 MMHG | OXYGEN SATURATION: 99 % | HEART RATE: 78 BPM | TEMPERATURE: 97.5 F | RESPIRATION RATE: 18 BRPM

## 2023-04-14 DIAGNOSIS — E78.5 HYPERLIPIDEMIA LDL GOAL <100: ICD-10-CM

## 2023-04-14 DIAGNOSIS — Z00.00 ROUTINE HISTORY AND PHYSICAL EXAMINATION OF ADULT: Primary | ICD-10-CM

## 2023-04-14 DIAGNOSIS — Z12.5 SCREENING FOR PROSTATE CANCER: ICD-10-CM

## 2023-04-14 DIAGNOSIS — I10 HYPERTENSION GOAL BP (BLOOD PRESSURE) < 140/90: ICD-10-CM

## 2023-04-14 DIAGNOSIS — Z12.11 SCREEN FOR COLON CANCER: ICD-10-CM

## 2023-04-14 LAB
ANION GAP SERPL CALCULATED.3IONS-SCNC: 13 MMOL/L (ref 7–15)
BUN SERPL-MCNC: 14.7 MG/DL (ref 8–23)
CALCIUM SERPL-MCNC: 9.3 MG/DL (ref 8.8–10.2)
CHLORIDE SERPL-SCNC: 105 MMOL/L (ref 98–107)
CHOLEST SERPL-MCNC: 198 MG/DL
CREAT SERPL-MCNC: 0.8 MG/DL (ref 0.67–1.17)
DEPRECATED HCO3 PLAS-SCNC: 26 MMOL/L (ref 22–29)
GFR SERPL CREATININE-BSD FRML MDRD: >90 ML/MIN/1.73M2
GLUCOSE SERPL-MCNC: 108 MG/DL (ref 70–99)
HDLC SERPL-MCNC: 74 MG/DL
LDLC SERPL CALC-MCNC: 109 MG/DL
NONHDLC SERPL-MCNC: 124 MG/DL
POTASSIUM SERPL-SCNC: 4.2 MMOL/L (ref 3.4–5.3)
PSA FREE MFR SERPL: 14.63 %
PSA FREE SERPL-MCNC: 0.3 NG/ML
PSA SERPL DL<=0.01 NG/ML-MCNC: 2.05 NG/ML (ref 0–4.5)
SODIUM SERPL-SCNC: 144 MMOL/L (ref 136–145)
TRIGL SERPL-MCNC: 75 MG/DL

## 2023-04-14 PROCEDURE — 36415 COLL VENOUS BLD VENIPUNCTURE: CPT | Performed by: NURSE PRACTITIONER

## 2023-04-14 PROCEDURE — 84154 ASSAY OF PSA FREE: CPT | Performed by: NURSE PRACTITIONER

## 2023-04-14 PROCEDURE — 80048 BASIC METABOLIC PNL TOTAL CA: CPT | Performed by: NURSE PRACTITIONER

## 2023-04-14 PROCEDURE — 99214 OFFICE O/P EST MOD 30 MIN: CPT | Mod: 25 | Performed by: NURSE PRACTITIONER

## 2023-04-14 PROCEDURE — 99396 PREV VISIT EST AGE 40-64: CPT | Performed by: NURSE PRACTITIONER

## 2023-04-14 PROCEDURE — 84153 ASSAY OF PSA TOTAL: CPT | Performed by: NURSE PRACTITIONER

## 2023-04-14 PROCEDURE — 80061 LIPID PANEL: CPT | Performed by: NURSE PRACTITIONER

## 2023-04-14 RX ORDER — ATORVASTATIN CALCIUM 20 MG/1
20 TABLET, FILM COATED ORAL DAILY
Qty: 90 TABLET | Refills: 3 | Status: SHIPPED | OUTPATIENT
Start: 2023-04-14 | End: 2024-03-05

## 2023-04-14 RX ORDER — AMLODIPINE BESYLATE 10 MG/1
10 TABLET ORAL DAILY
Qty: 90 TABLET | Refills: 3 | Status: SHIPPED | OUTPATIENT
Start: 2023-04-14 | End: 2024-03-05

## 2023-04-14 RX ORDER — LISINOPRIL AND HYDROCHLOROTHIAZIDE 20; 25 MG/1; MG/1
1 TABLET ORAL DAILY
Qty: 90 TABLET | Refills: 3 | Status: SHIPPED | OUTPATIENT
Start: 2023-04-14 | End: 2024-03-05

## 2023-04-14 ASSESSMENT — ENCOUNTER SYMPTOMS
DYSURIA: 0
FEVER: 0
ARTHRALGIAS: 0
PARESTHESIAS: 0
HEMATURIA: 0
SORE THROAT: 0
COUGH: 0
EYE PAIN: 0
MYALGIAS: 0
JOINT SWELLING: 0
NAUSEA: 0
PALPITATIONS: 0
DIZZINESS: 0
WEAKNESS: 0
ABDOMINAL PAIN: 0
FREQUENCY: 0
HEMATOCHEZIA: 0
HEADACHES: 0
CHILLS: 0
SHORTNESS OF BREATH: 0
HEARTBURN: 0
NERVOUS/ANXIOUS: 0
CONSTIPATION: 0
DIARRHEA: 0

## 2023-04-14 ASSESSMENT — PAIN SCALES - GENERAL: PAINLEVEL: NO PAIN (0)

## 2023-04-14 NOTE — PROGRESS NOTES
SUBJECTIVE:   CC: Jarred is an 62 year old who presents for preventative health visit.       4/14/2023     7:44 AM   Additional Questions   Roomed by Jana Covington mA   Accompanied by Self      Patient has been advised of split billing requirements and indicates understanding: Yes  Healthy Habits:     Getting at least 3 servings of Calcium per day:  Yes    Bi-annual eye exam:  Yes    Dental care twice a year:  Yes    Sleep apnea or symptoms of sleep apnea:  None    Diet:  Regular (no restrictions)    Frequency of exercise:  4-5 days/week    Duration of exercise:  15-30 minutes    Taking medications regularly:  Yes    Medication side effects:  None    PHQ-2 Total Score: 0    Additional concerns today:  No        Hyperlipidemia Follow-Up      Are you regularly taking any medication or supplement to lower your cholesterol?   Yes- Atorvastatin    Are you having muscle aches or other side effects that you think could be caused by your cholesterol lowering medication?  No    Hypertension Follow-up      Do you check your blood pressure regularly outside of the clinic? No     Are you following a low salt diet? Yes    Are your blood pressures ever more than 140 on the top number (systolic) OR more   than 90 on the bottom number (diastolic), for example 140/90? Yes      Today's PHQ-2 Score:       4/14/2023     7:38 AM   PHQ-2 ( 1999 [employer])   Q1: Little interest or pleasure in doing things 0   Q2: Feeling down, depressed or hopeless 0   PHQ-2 Score 0   Q1: Little interest or pleasure in doing things Not at all    Not at all   Q2: Feeling down, depressed or hopeless Not at all    Not at all   PHQ-2 Score 0    0     Have you ever done Advance Care Planning? (For example, a Health Directive, POLST, or a discussion with a medical provider or your loved ones about your wishes): No, advance care planning information given to patient to review.  Patient plans to discuss their wishes with loved ones or provider.      Social History      Tobacco Use     Smoking status: Former     Years: 1.00     Types: Cigarettes     Quit date: 1986     Years since quittin.2     Smokeless tobacco: Never   Vaping Use     Vaping status: Never Used   Substance Use Topics     Alcohol use: Yes     Alcohol/week: 0.0 standard drinks of alcohol     Comment: a few drinks now and then              2023     7:38 AM   Alcohol Use   Prescreen: >3 drinks/day or >7 drinks/week? No     Last PSA:   PSA   Date Value Ref Range Status   2021 0.87 0 - 4 ug/L Final     Comment:     Assay Method:  Chemiluminescence using Siemens Vista analyzer       Reviewed orders with patient. Reviewed health maintenance and updated orders accordingly - Yes  Lab work is in process  Labs reviewed in EPIC  BP Readings from Last 3 Encounters:   23 136/72   23 (!) 149/81   22 114/78    Wt Readings from Last 3 Encounters:   23 89.8 kg (198 lb)   22 90.3 kg (199 lb)   22 88.5 kg (195 lb)                  Patient Active Problem List   Diagnosis     Hyperlipidemia LDL goal <100     Hypertension goal BP (blood pressure) < 140/90     History of Clostridium difficile infection     Past Surgical History:   Procedure Laterality Date     ORTHOPEDIC SURGERY       REPAIR TENDON BICEPS DISTAL UPPER EXTREMITY  2014    Procedure: REPAIR TENDON BICEPS DISTAL UPPER EXTREMITY;  Surgeon: Ryan Joe MD;  Location: [facility]     SURGICAL HISTORY OF -       RIGHT knee arthroscopic surgery     SURGICAL HISTORY OF -   97    vasectomy       Social History     Tobacco Use     Smoking status: Former     Years: 1.00     Types: Cigarettes     Quit date: 1986     Years since quittin.2     Smokeless tobacco: Never   Vaping Use     Vaping status: Never Used   Substance Use Topics     Alcohol use: Yes     Alcohol/week: 0.0 standard drinks of alcohol     Comment: a few drinks now and then     Family History   Problem Relation Age of Onset      Hypertension Mother      Hypertension Father      C.A.D. Father      Cardiovascular Father      Hypertension Maternal Grandmother      Hypertension Maternal Grandfather      Hypertension Brother          Current Outpatient Medications   Medication Sig Dispense Refill     amLODIPine (NORVASC) 10 MG tablet Take 1 tablet (10 mg) by mouth daily 90 tablet 3     atorvastatin (LIPITOR) 20 MG tablet Take 1 tablet (20 mg) by mouth daily 90 tablet 3     lisinopril-hydrochlorothiazide (ZESTORETIC) 20-25 MG tablet Take 1 tablet by mouth daily 90 tablet 3     Multiple Vitamins-Minerals (ONE-A-DAY 50 PLUS PO) Take 1 tablet by mouth daily       Omega-3 Fatty Acids (FISH OIL BURP-LESS PO) Take 1 tablet by mouth every morning       Allergies   Allergen Reactions     Nkda [No Known Drug Allergies]      Recent Labs   Lab Test 07/18/22  1541 03/08/22  1203 02/09/21  0706 02/06/20  0657 04/17/19  0631 11/13/18  1327 10/05/18  1425   A1C  --   --   --  5.7*  --   --   --    LDL  --  74 81 93   < >  --   --    HDL  --  103 91 75   < >  --   --    TRIG  --  78 75 101   < >  --   --    ALT 39  --   --   --   --  34 51   CR 0.75 0.78 0.90 0.80   < > 0.92 0.91   GFRESTIMATED >90 >90 >90 >90   < > 85 87   GFRESTBLACK  --   --  >90 >90   < > >90 >90   POTASSIUM 3.6 3.4 3.7 3.7   < > 3.6 3.7    < > = values in this interval not displayed.        Reviewed and updated as needed this visit by clinical staff   Tobacco  Allergies  Meds              Reviewed and updated as needed this visit by Provider                   Past Medical History:   Diagnosis Date     Hypertension       Past Surgical History:   Procedure Laterality Date     ORTHOPEDIC SURGERY       REPAIR TENDON BICEPS DISTAL UPPER EXTREMITY  6/27/2014    Procedure: REPAIR TENDON BICEPS DISTAL UPPER EXTREMITY;  Surgeon: Ryan Joe MD;  Location: [facility]     SURGICAL HISTORY OF -   1995    RIGHT knee arthroscopic surgery     SURGICAL HISTORY OF -   1/16/97    vasectomy     OB  "History   No obstetric history on file.       Review of Systems   Constitutional: Negative for chills and fever.   HENT: Negative for congestion, ear pain, hearing loss and sore throat.    Eyes: Negative for pain and visual disturbance.   Respiratory: Negative for cough and shortness of breath.    Cardiovascular: Negative for chest pain, palpitations and peripheral edema.   Gastrointestinal: Negative for abdominal pain, constipation, diarrhea, heartburn, hematochezia and nausea.   Genitourinary: Negative for dysuria, frequency, genital sores, hematuria, impotence, penile discharge and urgency.   Musculoskeletal: Negative for arthralgias, joint swelling and myalgias.   Skin: Negative for rash.   Neurological: Negative for dizziness, weakness, headaches and paresthesias.   Psychiatric/Behavioral: Negative for mood changes. The patient is not nervous/anxious.         OBJECTIVE:   /72   Pulse 78   Temp 97.5  F (36.4  C) (Tympanic)   Resp 18   Ht 1.753 m (5' 9\")   Wt 89.8 kg (198 lb)   SpO2 99%   BMI 29.24 kg/m      Physical Exam  GENERAL: healthy, alert and no distress  EYES: Eyes grossly normal to inspection, PERRL and conjunctivae and sclerae normal  HENT: ear canals and TM's normal, nose and mouth without ulcers or lesions  NECK: no adenopathy, no asymmetry, masses, or scars and thyroid normal to palpation  RESP: lungs clear to auscultation - no rales, rhonchi or wheezes  CV: regular rate and rhythm, normal S1 S2, no S3 or S4, no murmur, click or rub, no peripheral edema and peripheral pulses strong  ABDOMEN: soft, nontender, no hepatosplenomegaly, no masses and bowel sounds normal  MS: no gross musculoskeletal defects noted, no edema  SKIN: no suspicious lesions or rashes  NEURO: Normal strength and tone, mentation intact and speech normal  PSYCH: mentation appears normal, affect normal/bright    Diagnostic Test Results:  Labs reviewed in Epic    ASSESSMENT/PLAN:   (Z00.00) Routine history and physical " "examination of adult  (primary encounter diagnosis)  Comment:    Plan:      (I10) Hypertension goal BP (blood pressure) < 140/90  Comment:    Plan: amLODIPine (NORVASC) 10 MG tablet,         lisinopril-hydrochlorothiazide (ZESTORETIC)         20-25 MG tablet           Controlled.     (E78.5) Hyperlipidemia LDL goal <100  Comment:    Plan: atorvastatin (LIPITOR) 20 MG tablet         Continue statin - no side effects reported.    (Z12.11) Screen for colon cancer  Comment:    Plan: Colonoscopy Screening  Referral         Due for colonoscopy    (Z12.5) Screening for prostate cancer  Comment:    Plan: PSA, total and free         Due for PSA screen - baseline      Patient has been advised of split billing requirements and indicates understanding: Yes      COUNSELING:   Reviewed preventive health counseling, as reflected in patient instructions       Regular exercise       Healthy diet/nutrition       Colorectal cancer screening       Prostate cancer screening      BMI:   Estimated body mass index is 29.24 kg/m  as calculated from the following:    Height as of this encounter: 1.753 m (5' 9\").    Weight as of this encounter: 89.8 kg (198 lb).   Weight management plan: Discussed healthy diet and exercise guidelines      He reports that he quit smoking about 37 years ago. His smoking use included cigarettes. He has never used smokeless tobacco.         Lety Barger NP  [facility]  "

## 2023-04-14 NOTE — RESULT ENCOUNTER NOTE
All of your lab results are normal.  Cholesterol is good.  Prostate screening is pending.    Please notify patient of results.  MARY Ferraro

## 2023-07-20 ENCOUNTER — TELEPHONE (OUTPATIENT)
Dept: FAMILY MEDICINE | Facility: CLINIC | Age: 63
End: 2023-07-20
Payer: COMMERCIAL

## 2023-07-20 NOTE — TELEPHONE ENCOUNTER
Patient Quality Outreach    Patient is due for the following:   Colon Cancer Screening    Next Steps:   Schedule Colonoscopy    Type of outreach:    Sent [employer] Mobile Digital message.      Questions for provider review:    None           April Markus

## 2024-01-22 ENCOUNTER — TELEPHONE (OUTPATIENT)
Dept: FAMILY MEDICINE | Facility: CLINIC | Age: 64
End: 2024-01-22
Payer: COMMERCIAL

## 2024-01-22 NOTE — LETTER
January 22, 2024      Steven Buck  [address] [place] [address]        Dear Steven,     Your team at [facility] cares about your health. We have reviewed your chart and based on our findings; we are making the following recommendations to better manage your health.     You are in particular need of attention regarding the following:     Call or MyChart message your clinic to schedule a colonoscopy, schedule/ a FIT Test, or order a Cologuard test. If you are unsure what type of test you need, please call your clinic and speak to clinic staff.   Colon cancer is now the second leading cause of cancer-related deaths in the [facility] [place] for both men and women and there are over 130,000 new cases and 50,000 deaths per year from colon cancer. Colonoscopies can prevent 90-95% of these deaths. Problem lesions can be removed before they ever become cancer. This test is not only looking for cancer, but also getting rid of precancerous lesions.   If you are under/uninsured, we recommend you contact the [employer] Program.[employer] is a free colorectal cancer screening program that provides colonoscopies for eligible under/uninsured [place] men and women. If you are interested in receiving a free colonoscopy, please call [employer] at t [phone] (mention code [employer]) to see if you're eligible. Please have them send us the results.     If you have already completed these items, please contact the clinic via phone or   Fund [employer] so your care team can review and update your records. Thank you for   choosing [facility] for your healthcare needs. For any questions,   concerns, or to schedule an appointment please contact our clinic.      Sincerely,        Lety Barger, DNP

## 2024-01-22 NOTE — TELEPHONE ENCOUNTER
Patient Quality Outreach    Patient is due for the following:   Colon Cancer Screening    Next Steps:   Due for colon cancer screen     Type of outreach:    Sent letter.      Questions for provider review:    None           Juanito Velasquez, CMA

## 2024-02-12 ENCOUNTER — HOSPITAL ENCOUNTER (OUTPATIENT)
Facility: CLINIC | Age: 64
End: 2024-02-12
Attending: SURGERY | Admitting: SURGERY
Payer: COMMERCIAL

## 2024-02-12 ENCOUNTER — MYC MEDICAL ADVICE (OUTPATIENT)
Dept: FAMILY MEDICINE | Facility: CLINIC | Age: 64
End: 2024-02-12
Payer: COMMERCIAL

## 2024-02-12 DIAGNOSIS — Z12.11 SCREEN FOR COLON CANCER: Primary | ICD-10-CM

## 2024-02-12 NOTE — TELEPHONE ENCOUNTER
[facility].   Referral was placed 4/14/23 and patient can't get in until May, which will be just over a year, so needs new referral-pended for signature.     Ramandeep Quintero RN  [facility]

## 2024-03-02 ENCOUNTER — MYC MEDICAL ADVICE (OUTPATIENT)
Dept: FAMILY MEDICINE | Facility: CLINIC | Age: 64
End: 2024-03-02
Payer: COMMERCIAL

## 2024-03-02 DIAGNOSIS — E78.5 HYPERLIPIDEMIA LDL GOAL <100: ICD-10-CM

## 2024-03-02 DIAGNOSIS — I10 HYPERTENSION GOAL BP (BLOOD PRESSURE) < 140/90: ICD-10-CM

## 2024-03-05 RX ORDER — AMLODIPINE BESYLATE 10 MG/1
10 TABLET ORAL DAILY
Qty: 90 TABLET | Refills: 0 | Status: SHIPPED | OUTPATIENT
Start: 2024-03-05 | End: 2024-04-19

## 2024-03-05 RX ORDER — ATORVASTATIN CALCIUM 20 MG/1
20 TABLET, FILM COATED ORAL DAILY
Qty: 90 TABLET | Refills: 0 | Status: SHIPPED | OUTPATIENT
Start: 2024-03-05 | End: 2024-04-19

## 2024-03-05 RX ORDER — LISINOPRIL AND HYDROCHLOROTHIAZIDE 20; 25 MG/1; MG/1
1 TABLET ORAL DAILY
Qty: 90 TABLET | Refills: 0 | Status: SHIPPED | OUTPATIENT
Start: 2024-03-05 | End: 2024-04-19

## 2024-03-11 NOTE — TELEPHONE ENCOUNTER
MyChart reply sent to patient and closing encounter.     Ramandeep Quintero RN  [facility]

## 2024-04-19 ENCOUNTER — ANCILLARY PROCEDURE (OUTPATIENT)
Dept: GENERAL RADIOLOGY | Facility: CLINIC | Age: 64
End: 2024-04-19
Attending: NURSE PRACTITIONER
Payer: COMMERCIAL

## 2024-04-19 ENCOUNTER — OFFICE VISIT (OUTPATIENT)
Dept: FAMILY MEDICINE | Facility: CLINIC | Age: 64
End: 2024-04-19
Payer: COMMERCIAL

## 2024-04-19 ENCOUNTER — MYC MEDICAL ADVICE (OUTPATIENT)
Dept: FAMILY MEDICINE | Facility: CLINIC | Age: 64
End: 2024-04-19

## 2024-04-19 VITALS
SYSTOLIC BLOOD PRESSURE: 136 MMHG | HEART RATE: 91 BPM | TEMPERATURE: 97.2 F | BODY MASS INDEX: 30.96 KG/M2 | WEIGHT: 209 LBS | HEIGHT: 69 IN | RESPIRATION RATE: 20 BRPM | OXYGEN SATURATION: 98 % | DIASTOLIC BLOOD PRESSURE: 86 MMHG

## 2024-04-19 DIAGNOSIS — Z12.5 SCREENING FOR PROSTATE CANCER: ICD-10-CM

## 2024-04-19 DIAGNOSIS — E78.5 HYPERLIPIDEMIA LDL GOAL <100: ICD-10-CM

## 2024-04-19 DIAGNOSIS — I10 HYPERTENSION GOAL BP (BLOOD PRESSURE) < 140/90: ICD-10-CM

## 2024-04-19 DIAGNOSIS — M54.42 ACUTE LEFT-SIDED LOW BACK PAIN WITH LEFT-SIDED SCIATICA: ICD-10-CM

## 2024-04-19 DIAGNOSIS — Z00.00 ROUTINE GENERAL MEDICAL EXAMINATION AT A HEALTH CARE FACILITY: Primary | ICD-10-CM

## 2024-04-19 DIAGNOSIS — M25.561 ARTHRALGIA OF BOTH KNEES: ICD-10-CM

## 2024-04-19 DIAGNOSIS — M25.562 ARTHRALGIA OF BOTH KNEES: ICD-10-CM

## 2024-04-19 LAB
ANION GAP SERPL CALCULATED.3IONS-SCNC: 11 MMOL/L (ref 7–15)
BUN SERPL-MCNC: 14.6 MG/DL (ref 8–23)
CALCIUM SERPL-MCNC: 9.7 MG/DL (ref 8.8–10.2)
CHLORIDE SERPL-SCNC: 104 MMOL/L (ref 98–107)
CHOLEST SERPL-MCNC: 220 MG/DL
CREAT SERPL-MCNC: 0.9 MG/DL (ref 0.67–1.17)
DEPRECATED HCO3 PLAS-SCNC: 29 MMOL/L (ref 22–29)
EGFRCR SERPLBLD CKD-EPI 2021: >90 ML/MIN/1.73M2
FASTING STATUS PATIENT QL REPORTED: YES
GLUCOSE SERPL-MCNC: 108 MG/DL (ref 70–99)
HDLC SERPL-MCNC: 107 MG/DL
LDLC SERPL CALC-MCNC: 102 MG/DL
NONHDLC SERPL-MCNC: 113 MG/DL
POTASSIUM SERPL-SCNC: 4.1 MMOL/L (ref 3.4–5.3)
SODIUM SERPL-SCNC: 144 MMOL/L (ref 135–145)
TRIGL SERPL-MCNC: 55 MG/DL

## 2024-04-19 PROCEDURE — 73560 X-RAY EXAM OF KNEE 1 OR 2: CPT | Mod: TC | Performed by: RADIOLOGY

## 2024-04-19 PROCEDURE — 84153 ASSAY OF PSA TOTAL: CPT | Performed by: NURSE PRACTITIONER

## 2024-04-19 PROCEDURE — 36415 COLL VENOUS BLD VENIPUNCTURE: CPT | Performed by: NURSE PRACTITIONER

## 2024-04-19 PROCEDURE — 84154 ASSAY OF PSA FREE: CPT | Performed by: NURSE PRACTITIONER

## 2024-04-19 PROCEDURE — 99214 OFFICE O/P EST MOD 30 MIN: CPT | Mod: 25 | Performed by: NURSE PRACTITIONER

## 2024-04-19 PROCEDURE — 99396 PREV VISIT EST AGE 40-64: CPT | Performed by: NURSE PRACTITIONER

## 2024-04-19 PROCEDURE — 80048 BASIC METABOLIC PNL TOTAL CA: CPT | Performed by: NURSE PRACTITIONER

## 2024-04-19 PROCEDURE — 80061 LIPID PANEL: CPT | Performed by: NURSE PRACTITIONER

## 2024-04-19 RX ORDER — AMLODIPINE BESYLATE 10 MG/1
10 TABLET ORAL DAILY
Qty: 90 TABLET | Refills: 3 | Status: SHIPPED | OUTPATIENT
Start: 2024-04-19

## 2024-04-19 RX ORDER — ATORVASTATIN CALCIUM 20 MG/1
20 TABLET, FILM COATED ORAL DAILY
Qty: 90 TABLET | Refills: 3 | Status: SHIPPED | OUTPATIENT
Start: 2024-04-19

## 2024-04-19 RX ORDER — LISINOPRIL AND HYDROCHLOROTHIAZIDE 20; 25 MG/1; MG/1
1 TABLET ORAL DAILY
Qty: 90 TABLET | Refills: 3 | Status: SHIPPED | OUTPATIENT
Start: 2024-04-19

## 2024-04-19 ASSESSMENT — PAIN SCALES - GENERAL: PAINLEVEL: NO PAIN (0)

## 2024-04-19 NOTE — NURSING NOTE
"Chief Complaint   Patient presents with    Physical    Lipids    Hypertension       Initial There were no vitals taken for this visit. Estimated body mass index is 29.24 kg/m  as calculated from the following:    Height as of 4/14/23: 1.753 m (5' 9\").    Weight as of 4/14/23: 89.8 kg (198 lb).    Patient presents to the clinic using No DME    Is there anyone who you would like to be able to receive your results? No  If yes have patient fill out ANISHA      "

## 2024-04-19 NOTE — PATIENT INSTRUCTIONS
Preventive Care Advice   This is general advice given by our system to help you stay healthy. However, your care team may have specific advice just for you. Please talk to your care team about your preventive care needs.  Nutrition  Eat 5 or more servings of fruits and vegetables each day.  Try wheat bread, brown rice and whole grain pasta (instead of white bread, rice, and pasta).  Get enough calcium and vitamin D. Check the label on foods and aim for 100% of the RDA (recommended daily allowance).  Lifestyle  Exercise at least 150 minutes each week   (30 minutes a day, 5 days a week).  Do muscle strengthening activities 2 days a week. These help control your weight and prevent disease.  No smoking.  Wear sunscreen to prevent skin cancer.  Have a dental exam and cleaning every 6 months.  Yearly exams  See your health care team every year to talk about:  Any changes in your health.  Any medicines your care team has prescribed.  Preventive care, family planning, and ways to prevent chronic diseases.  Shots (vaccines)   HPV shots (up to age 26), if you've never had them before.  Hepatitis B shots (up to age 59), if you've never had them before.  COVID-19 shot: Get this shot when it's due.  Flu shot: Get a flu shot every year.  Tetanus shot: Get a tetanus shot every 10 years.  Pneumococcal, hepatitis A, and RSV shots: Ask your care team if you need these based on your risk.  Shingles shot (for age 50 and up).  General health tests  Diabetes screening:  Starting at age 35, Get screened for diabetes at least every 3 years.  If you are younger than age 35, ask your care team if you should be screened for diabetes.  Cholesterol test: At age 39, start having a cholesterol test every 5 years, or more often if advised.  Bone density scan (DEXA): At age 50, ask your care team if you should have this scan for osteoporosis (brittle bones).  Hepatitis C: Get tested at least once in your life.  STIs (sexually transmitted  infections)  Before age 24: Ask your care team if you should be screened for STIs.  After age 24: Get screened for STIs if you're at risk. You are at risk for STIs (including HIV) if:  You are sexually active with more than one person.  You don't use condoms every time.  You or a partner was diagnosed with a sexually transmitted infection.  If you are at risk for HIV, ask about PrEP medicine to prevent HIV.  Get tested for HIV at least once in your life, whether you are at risk for HIV or not.  Cancer screening tests  Cervical cancer screening: If you have a cervix, begin getting regular cervical cancer screening tests at age 21. Most people who have regular screenings with normal results can stop after age 65. Talk about this with your provider.  Breast cancer scan (mammogram): If you've ever had breasts, begin having regular mammograms starting at age 40. This is a scan to check for breast cancer.  Colon cancer screening: It is important to start screening for colon cancer at age 45.  Have a colonoscopy test every 10 years (or more often if you're at risk) Or, ask your provider about stool tests like a FIT test every year or Cologuard test every 3 years.  To learn more about your testing options, visit: https://www.[employer]/504221.pdf.  For help making a decision, visit: https://bit.ly/hl64684.  Prostate cancer screening test: If you have a prostate and are age 55 to 69, ask your provider if you would benefit from a yearly prostate cancer screening test.  Lung cancer screening: If you are a current or former smoker age 50 to 80, ask your care team if ongoing lung cancer screenings are right for you.  For informational purposes only. Not to replace the advice of your health care provider. Copyright   2023 [employer]. All rights reserved. Clinically reviewed by the [facility]. [employer] [id number] - REV 01/24.

## 2024-04-19 NOTE — PROGRESS NOTES
"Preventive Care Visit  [facility]  Lety Barger DNP, Family Medicine  Apr 19, 2024      Assessment & Plan     Routine general medical examination at a health care facility       Acute left-sided low back pain with left-sided sciatica  Discussed home treatment. Recommended Lidocaine patches, over the counter ibuprofen prn, home stretches.    Hyperlipidemia LDL goal <100  Discussed joint pain and statin use.  Unlikely the cause but will hold statin for 1-2 weeks and Mychart me with update on symptoms.  Restart if no change in symptoms.    - Lipid panel reflex to direct LDL Non-fasting  - atorvastatin (LIPITOR) 20 MG tablet  Dispense: 90 tablet; Refill: 3    Hypertension goal BP (blood pressure) < 140/90  Controlled.    - amLODIPine (NORVASC) 10 MG tablet  Dispense: 90 tablet; Refill: 3  - lisinopril-hydrochlorothiazide (ZESTORETIC) 20-25 MG tablet  Dispense: 90 tablet; Refill: 3  - Basic metabolic panel  (Ca, Cl, CO2, Creat, Gluc, K, Na, BUN)    Arthralgia of both knees  Xray today to rule out OA.  Discussed treatment options. Bracing, over the counter Tylenol.    - XR Knee Bilateral 1/2 Views    Screening for prostate cancer     - PSA, total and free         BMI  Estimated body mass index is 30.85 kg/m  as calculated from the following:    Height as of this encounter: 1.753 m (5' 9.02\").    Weight as of this encounter: 94.8 kg (209 lb).   Weight management plan: Discussed healthy diet and exercise guidelines      See Patient Instructions    William Stern is a 63 year old, presenting for the following:  Physical, Lipids, and Hypertension        4/19/2024     8:21 AM   Additional Questions   Roomed by min daugherty cma        Health Care Directive  Patient does not have a Health Care Directive or Living Will: Discussed advance care planning with patient; information given to patient to review.    HPI       Hyperlipidemia Follow-Up    Are you regularly taking any medication or " supplement to lower your cholesterol?   Yes- lipitor  Are you having muscle aches or other side effects that you think could be caused by your cholesterol lowering medication?  Yes- both knees ache     Hypertension Follow-up    Do you check your blood pressure regularly outside of the clinic? No   Are you following a low salt diet? No  Are your blood pressures ever more than 140 on the top number (systolic) OR more   than 90 on the bottom number (diastolic), for example 140/90? No    Concern - bilateral knee pain and left lower back pain  Onset: a few weeks/month ago - intermittent - worse with prolonged standing and walking  Description: ache and occaso  Intensity: mild  Progression of Symptoms:  worsening  Accompanying Signs & Symptoms: radiating to left hip/lower buttocks  Previous history of similar problem: Similar low back pain  Precipitating factors:        Worsened by: walking, kneeling (knees)  Alleviating factors:        Improved by: Rest  Therapies tried and outcome: over the counter Tylenol and Ibuprofen with some relief. Has tried Fabio Munoz with some improvement in LBP            2023   Nutrition   Three or more servings of calcium each day? Yes   Diet: regular (no restrictions)         2023   Exercise   Frequency of exercise: 4-5 days/week            2023   Dental   Dentist two times every year? Yes            Today's PHQ-2 Score:       2024     8:18 AM   PHQ-2 (  [employer])   Q1: Little interest or pleasure in doing things 0   Q2: Feeling down, depressed or hopeless 0   PHQ-2 Score 0   Q1: Little interest or pleasure in doing things Not at all   Q2: Feeling down, depressed or hopeless Not at all   PHQ-2 Score 0           2023   Substance Use   Alcohol more than 3/day or more than 7/wk No     Social History     Tobacco Use    Smoking status: Former     Current packs/day: 0.00     Types: Cigarettes     Start date: 1985     Quit date: 1986     Years since quittin.3     Smokeless tobacco: Never   Vaping Use    Vaping status: Never Used   Substance Use Topics    Alcohol use: Yes     Alcohol/week: 0.0 standard drinks of alcohol     Comment: a few drinks now and then    Drug use: No       Last PSA:   PSA   Date Value Ref Range Status   02/09/2021 0.87 0 - 4 ug/L Final     Comment:     Assay Method:  Chemiluminescence using Siemens Vista analyzer     PSA Tumor Marker   Date Value Ref Range Status   04/14/2023 2.05 0.00 - 4.50 ng/mL Final     ASCVD Risk   The ASCVD Risk score (Trent BEDOYA, et al., 2019) failed to calculate for the following reasons:    The valid HDL cholesterol range is 20 to 100 mg/dL     Reviewed and updated as needed this visit by Provider     Meds  Problems               Past Medical History:   Diagnosis Date    Hypertension      Past Surgical History:   Procedure Laterality Date    ORTHOPEDIC SURGERY      REPAIR TENDON BICEPS DISTAL UPPER EXTREMITY  6/27/2014    Procedure: REPAIR TENDON BICEPS DISTAL UPPER EXTREMITY;  Surgeon: Ryan Joe MD;  Location: [facility]    SURGICAL HISTORY OF -   1995    RIGHT knee arthroscopic surgery    SURGICAL HISTORY OF -   1/16/97    vasectomy     OB History   No obstetric history on file.     Lab work is in process  Labs reviewed in EPIC  BP Readings from Last 3 Encounters:   04/19/24 136/86   04/14/23 136/72   02/01/23 (!) 149/81    Wt Readings from Last 3 Encounters:   04/19/24 94.8 kg (209 lb)   04/14/23 89.8 kg (198 lb)   07/21/22 90.3 kg (199 lb)                  Patient Active Problem List   Diagnosis    Hyperlipidemia LDL goal <100    Hypertension goal BP (blood pressure) < 140/90    History of Clostridium difficile infection    Acute left-sided low back pain with left-sided sciatica    Arthralgia of both knees     Past Surgical History:   Procedure Laterality Date    ORTHOPEDIC SURGERY      REPAIR TENDON BICEPS DISTAL UPPER EXTREMITY  6/27/2014    Procedure: REPAIR TENDON BICEPS DISTAL UPPER EXTREMITY;   Surgeon: Ryan Joe MD;  Location: [facility]    SURGICAL HISTORY OF -       RIGHT knee arthroscopic surgery    SURGICAL HISTORY OF -   97    vasectomy       Social History     Tobacco Use    Smoking status: Former     Current packs/day: 0.00     Types: Cigarettes     Start date: 1985     Quit date: 1986     Years since quittin.3    Smokeless tobacco: Never   Substance Use Topics    Alcohol use: Yes     Alcohol/week: 0.0 standard drinks of alcohol     Comment: a few drinks now and then     Family History   Problem Relation Age of Onset    Hypertension Mother     Hypertension Father     C.A.D. Father     Cardiovascular Father     Hypertension Maternal Grandmother     Hypertension Maternal Grandfather     Hypertension Brother          Current Outpatient Medications   Medication Sig Dispense Refill    amLODIPine (NORVASC) 10 MG tablet Take 1 tablet (10 mg) by mouth daily 90 tablet 3    atorvastatin (LIPITOR) 20 MG tablet Take 1 tablet (20 mg) by mouth daily 90 tablet 3    lisinopril-hydrochlorothiazide (ZESTORETIC) 20-25 MG tablet Take 1 tablet by mouth daily 90 tablet 3    Multiple Vitamins-Minerals (ONE-A-DAY 50 PLUS PO) Take 1 tablet by mouth daily      Omega-3 Fatty Acids (FISH OIL BURP-LESS PO) Take 1 tablet by mouth every morning       Allergies   Allergen Reactions    Nkda [No Known Drug Allergy]      Recent Labs   Lab Test 24  0916 23  0817 22  1541 22  1203 22  1203 21  0706 20  0657 19  0631 18  1327 10/05/18  1425   A1C  --   --   --   --   --   --  5.7*  --   --   --    * 109*  --   --  74 81 93   < >  --   --     74  --   --  103 91 75   < >  --   --    TRIG 55 75  --   --  78 75 101   < >  --   --    ALT  --   --  39  --   --   --   --   --  34 51   CR 0.90 0.80 0.75   < > 0.78 0.90 0.80   < > 0.92 0.91   GFRESTIMATED >90 >90 >90   < > >90 >90 >90   < > 85 87   GFRESTBLACK  --   --   --   --   --  >90 >90   <  "> >90 >90   POTASSIUM 4.1 4.2 3.6   < > 3.4 3.7 3.7   < > 3.6 3.7    < > = values in this interval not displayed.          Review of Systems  Constitutional, HEENT, cardiovascular, pulmonary, GI, , musculoskeletal, neuro, skin, endocrine and psych systems are negative, except as otherwise noted.     Objective    Exam  /86 (BP Location: Right arm, Patient Position: Sitting, Cuff Size: Adult Large)   Pulse 91   Temp 97.2  F (36.2  C) (Tympanic)   Resp 20   Ht 1.753 m (5' 9.02\")   Wt 94.8 kg (209 lb)   SpO2 98%   BMI 30.85 kg/m     Estimated body mass index is 30.85 kg/m  as calculated from the following:    Height as of this encounter: 1.753 m (5' 9.02\").    Weight as of this encounter: 94.8 kg (209 lb).    Physical Exam  GENERAL: alert and no distress  EYES: Eyes grossly normal to inspection, PERRL and conjunctivae and sclerae normal  HENT: ear canals and TM's normal, nose and mouth without ulcers or lesions  NECK: no adenopathy, no asymmetry, masses, or scars  RESP: lungs clear to auscultation - no rales, rhonchi or wheezes  CV: regular rate and rhythm, normal S1 S2, no S3 or S4, no murmur, click or rub, no peripheral edema  ABDOMEN: soft, nontender, no hepatosplenomegaly, no masses and bowel sounds normal  MS: extremities normal- no gross deformities noted, bilateral knees with some medial joint line tenderness bilateral, + Rach right lateral, full ROM but with pain on full flexion.  Bilateral hip with full ROM and back with full ROM.  Lower para lumbar spine with mild tenderness.   SKIN: no suspicious lesions or rashes  NEURO: Normal strength and tone, mentation intact and speech normal  PSYCH: mentation appears normal, affect normal/bright        Signed Electronically by: Lety Barger DNP           "

## 2024-04-19 NOTE — RESULT ENCOUNTER NOTE
All of your lab results are normal.  Your glucose is mildly elevated - indicating pre diabetes. Continue work on daily exercise and less sugar, carbohydrates in diet.  Prostate screening is pending.    Please notify patient of results.  MARY Ferraro

## 2024-04-20 LAB
PSA FREE MFR SERPL: 11.24 %
PSA FREE SERPL-MCNC: 0.3 NG/ML
PSA SERPL DL<=0.01 NG/ML-MCNC: 2.67 NG/ML (ref 0–4.5)

## 2024-04-23 ENCOUNTER — MYC MEDICAL ADVICE (OUTPATIENT)
Dept: FAMILY MEDICINE | Facility: CLINIC | Age: 64
End: 2024-04-23
Payer: COMMERCIAL

## 2024-04-23 DIAGNOSIS — M25.561 ARTHRALGIA OF BOTH KNEES: Primary | ICD-10-CM

## 2024-04-23 DIAGNOSIS — M25.562 ARTHRALGIA OF BOTH KNEES: Primary | ICD-10-CM

## 2024-04-23 NOTE — TELEPHONE ENCOUNTER
Lety  Pt requests order for steroid injections in his knees as you recommended.     Pablo Elizabeth RN

## 2024-04-25 NOTE — TELEPHONE ENCOUNTER
Called patient and gave him number listed on the referral. He will call if he doesn't hear anything by tomorrow.     YOUNG Pham

## 2024-05-07 ENCOUNTER — OFFICE VISIT (OUTPATIENT)
Dept: ORTHOPEDICS | Facility: CLINIC | Age: 64
End: 2024-05-07
Attending: NURSE PRACTITIONER
Payer: COMMERCIAL

## 2024-05-07 VITALS
HEIGHT: 69 IN | SYSTOLIC BLOOD PRESSURE: 143 MMHG | DIASTOLIC BLOOD PRESSURE: 83 MMHG | WEIGHT: 209 LBS | BODY MASS INDEX: 30.96 KG/M2

## 2024-05-07 DIAGNOSIS — M25.561 ARTHRALGIA OF BOTH KNEES: ICD-10-CM

## 2024-05-07 DIAGNOSIS — M25.562 BILATERAL CHRONIC KNEE PAIN: Primary | ICD-10-CM

## 2024-05-07 DIAGNOSIS — G89.29 BILATERAL CHRONIC KNEE PAIN: Primary | ICD-10-CM

## 2024-05-07 DIAGNOSIS — M17.0 BILATERAL PRIMARY OSTEOARTHRITIS OF KNEE: ICD-10-CM

## 2024-05-07 DIAGNOSIS — M25.561 BILATERAL CHRONIC KNEE PAIN: Primary | ICD-10-CM

## 2024-05-07 DIAGNOSIS — M25.562 ARTHRALGIA OF BOTH KNEES: ICD-10-CM

## 2024-05-07 PROCEDURE — 20611 DRAIN/INJ JOINT/BURSA W/US: CPT | Mod: 50 | Performed by: FAMILY MEDICINE

## 2024-05-07 PROCEDURE — 99203 OFFICE O/P NEW LOW 30 MIN: CPT | Mod: 25 | Performed by: FAMILY MEDICINE

## 2024-05-07 RX ORDER — TRIAMCINOLONE ACETONIDE 40 MG/ML
40 INJECTION, SUSPENSION INTRA-ARTICULAR; INTRAMUSCULAR
Status: SHIPPED | OUTPATIENT
Start: 2024-05-07

## 2024-05-07 RX ORDER — ROPIVACAINE HYDROCHLORIDE 5 MG/ML
3 INJECTION, SOLUTION EPIDURAL; INFILTRATION; PERINEURAL
Status: SHIPPED | OUTPATIENT
Start: 2024-05-07

## 2024-05-07 RX ADMIN — TRIAMCINOLONE ACETONIDE 40 MG: 40 INJECTION, SUSPENSION INTRA-ARTICULAR; INTRAMUSCULAR at 09:00

## 2024-05-07 RX ADMIN — ROPIVACAINE HYDROCHLORIDE 3 ML: 5 INJECTION, SOLUTION EPIDURAL; INFILTRATION; PERINEURAL at 09:00

## 2024-05-07 NOTE — LETTER
2024         RE: Steven Buck  [address] [place] [address]        Dear Colleague,    Thank you for referring your patient, Steven Buck, to the [facility] [place]. Please see a copy of my visit note below.    Steven Buck  :  1960  DOS: 2024  MRN: [id number]    Sports Medicine Clinic Visit    PCP: Lety Barger    Steven Buck is a 63 year old male who is seen in consultation at the request of  Lety Barger  DNP presenting with chronic bilateral knee pain.    Injury: Gradual onset of pain over the past 1+ years, potentially started following tick bite 2 years ago.  Pain located over bilateral deep medial knee, nonradiating.  Additional Features:  Positive: swelling, grinding, antalgic gaits, and joint stiffness.  Symptoms are better with Ibuprofen and Rest.  Symptoms are worse with: prolonged sitting, walking, going from sit to stand, ascending stairs, bending knees.  Other evaluation and/or treatments so far consists of: Ice, Ibuprofen, and Rest.  Recent imaging completed: X-rays completed 24.  Prior History of related problems: none    Social History: retired - works part-time as electrical field tech    Review of Systems  Musculoskeletal: as above  Remainder of review of systems is negative including constitutional, CV, pulmonary, GI, Skin and Neurologic except as noted in HPI or medical history.    Past Medical History:   Diagnosis Date     Hypertension      Past Surgical History:   Procedure Laterality Date     ORTHOPEDIC SURGERY       REPAIR TENDON BICEPS DISTAL UPPER EXTREMITY  2014    Procedure: REPAIR TENDON BICEPS DISTAL UPPER EXTREMITY;  Surgeon: Ryan Jeo MD;  Location: [facility]     SURGICAL HISTORY OF -       RIGHT knee arthroscopic surgery     SURGICAL HISTORY OF -   97    vasectomy     Family History   Problem Relation Age of Onset     Hypertension Mother      Hypertension  "Father      RICKY. Father      Cardiovascular Father      Hypertension Maternal Grandmother      Hypertension Maternal Grandfather      Hypertension Brother        Objective  BP (!) 143/83   Ht 1.753 m (5' 9\")   Wt 94.8 kg (209 lb)   BMI 30.86 kg/m      General: healthy, alert and in no distress    HEENT: no scleral icterus or conjunctival erythema   Skin: no suspicious lesions or rash. No jaundice.   CV: regular rhythm by palpation, 2+ distal pulses, no pedal edema    Resp: normal respiratory effort without conversational dyspnea   Psych: normal mood and affect    Gait: antalgic, appropriate coordination and balance   Neuro: normal light touch sensory exam of the extremities. Motor strength as noted below     Bilateral Knee exam    ROM:        Mildly decreased terminal  active and passive ROM with flexion and extension    Inspection:       no visible ecchymosis        effusion noted trace b/l       Left quad atrophy    Skin:       no visible deformities       well perfused       capillary refill brisk    Patellar Motion:        Normal patellar tracking noted through range of motion       Crepitus noted in the patellofemoral joint    Tender:        lateral patellar border       medial joint line       lateral joint line    Non Tender:         remainder of knee area    Special Tests:        Painful b/l Rach       neg (-) Lachman       neg (-) anterior drawer       neg (-) posterior drawer       neg (-) varus at 0 deg and 30 deg       neg (-) valgus at 0 deg and 30 deg       mild pain with forced extension R>L    Evaluation of ipsilateral kinetic chain       normal strength with hip extension and abduction      Radiology  Results for orders placed or performed in visit on 04/19/24   XR Knee Bilateral 1/2 Views    Narrative    KNEE BILATERAL ONE TO TWO VIEWS  4/19/2024 9:24 AM     HISTORY: Bilateral knee pain, right greater than left. Arthralgia of  both knees.    COMPARISON: 7/10/2018.       Impression    " IMPRESSION: Single AP view of both knees shows advanced medial  compartment arthrosis right knee and mild to moderate joint space  narrowing involving the left knee.     Atherosclerotic vascular calcifications.     GILDA BLANK MD         SYSTEM ID:  [id number]     Large Joint Injection/Arthocentesis: bilateral knee    Date/Time: 5/7/2024 9:00 AM    Performed by: Jose Wick DO  Authorized by: Jose Wick DO    Indications:  Pain and osteoarthritis  Needle Size:  22 G  Guidance: ultrasound    Approach:  Superolateral  Location:  Knee  Laterality:  Bilateral      Medications (Right):  40 mg triamcinolone 40 MG/ML; 3 mL ROPivacaine 5 MG/ML  Medications (Left):  40 mg triamcinolone 40 MG/ML; 3 mL ROPivacaine 5 MG/ML  Outcome:  Tolerated well, no immediate complications  Procedure discussed: discussed risks, benefits, and alternatives    Consent Given by:  Patient  Timeout: timeout called immediately prior to procedure    Prep: patient was prepped and draped in usual sterile fashion     Ultrasound images of procedure were permanently stored.         Assessment:  1. Bilateral chronic knee pain    2. Bilateral primary osteoarthritis of knee    3. Arthralgia of both knees        Plan:  Discussed the assessment with the patient, issue is chronic and worsening  Follow up: prn based on short term clinical progress  Moderate left and advanced right knee DJD, hx of meniscectomy on the right remotely  XR images independently visualized and reviewed with patient today in clinic  Reviewed wt loss, activity modification and progressive increase in activity as tolerated and guided by pain  Reviewed options for potential steroid vs viscosupplementation injections and the possibility for future orthopedic referral prn  Reviewed safe and appropriate OTC medication choices, try tylenol first  Up to 3000mg daily of tylenol is generally safe, NSAID dosing and duration limitations reviewed  Discussed nature of  degenerative arthrosis of the knee.   Discussed symptom treatment with ice or heat, topical treatments, and rest if needed.   Trial of US guided CSI to bilateral knees today, consider visco trial in the future if/when pain recurs  Reviewed benefits of strengthening as well as low impact activity strategies  Pt options, bracing and assistive device strategies reviewed  Expectations and goals of CSI reviewed  Often 2-3 days for steroid effect, and can take up to two weeks for maximum effect  We discussed modified progressive pain-free activity as tolerated  Do not overuse in first two weeks if feeling better due to concern for vulnerability while steroid is working  Supportive care reviewed  All questions were answered today  Contact us with additional questions or concerns  Signs and sx of concern reviewed    Thanks very much for sending this nice gentleman to us, I will keep you updated with his progress      Jose Wick DO, JONO  Sports Medicine Physician  [facility]            Disclaimer: This note consists of symbols derived from keyboarding, dictation and/or voice recognition software. As a result, there may be errors in the script that have gone undetected. Please consider this when interpreting information found in this chart.      Again, thank you for allowing me to participate in the care of your patient.        Sincerely,        Jose Wick DO

## 2024-08-05 ENCOUNTER — TELEPHONE (OUTPATIENT)
Dept: FAMILY MEDICINE | Facility: CLINIC | Age: 64
End: 2024-08-05
Payer: COMMERCIAL

## 2024-08-05 NOTE — TELEPHONE ENCOUNTER
Patient Quality Outreach    Patient is due for the following:   Colon Cancer Screening    Next Steps:   Due for colon cancer screening     Type of outreach:    Sent [employer] message.      Questions for provider review:    None           Juanito Velasquez, CMA

## 2024-11-05 ENCOUNTER — TELEPHONE (OUTPATIENT)
Dept: FAMILY MEDICINE | Facility: CLINIC | Age: 64
End: 2024-11-05
Payer: COMMERCIAL

## 2024-11-05 NOTE — TELEPHONE ENCOUNTER
Patient Quality Outreach    Patient is due for the following:   Colon Cancer Screening    Next Steps:   Due for colon cancer screen     Type of outreach:    Sent [employer] message.      Questions for provider review:    None           Juanito Velasquez, CMA

## 2024-12-12 ENCOUNTER — OFFICE VISIT (OUTPATIENT)
Dept: ORTHOPEDICS | Facility: CLINIC | Age: 64
End: 2024-12-12
Payer: COMMERCIAL

## 2024-12-12 ENCOUNTER — ANCILLARY PROCEDURE (OUTPATIENT)
Dept: GENERAL RADIOLOGY | Facility: CLINIC | Age: 64
End: 2024-12-12
Attending: PEDIATRICS
Payer: COMMERCIAL

## 2024-12-12 VITALS — WEIGHT: 209 LBS | BODY MASS INDEX: 30.96 KG/M2 | HEIGHT: 69 IN

## 2024-12-12 DIAGNOSIS — M17.10 ARTHRITIS OF KNEE: Primary | ICD-10-CM

## 2024-12-12 DIAGNOSIS — M17.0 BILATERAL PRIMARY OSTEOARTHRITIS OF KNEE: ICD-10-CM

## 2024-12-12 DIAGNOSIS — M25.461 EFFUSION OF RIGHT KNEE: ICD-10-CM

## 2024-12-12 RX ORDER — NAPROXEN 500 MG/1
500 TABLET ORAL 2 TIMES DAILY PRN
Qty: 30 TABLET | Refills: 0 | Status: SHIPPED | OUTPATIENT
Start: 2024-12-12

## 2024-12-12 NOTE — LETTER
12/12/2024      Steven Buck  [address] [place] [address]      Dear Colleague,    Thank you for referring your patient, Steven Buck, to the [facility] [place]. Please see a copy of my visit note below.    ASSESSMENT & PLAN    Jarred was seen today for pain.    Diagnoses and all orders for this visit:    Arthritis of knee  -     XR Knee Right 1/2 Views; Future  -     naproxen (NAPROSYN) 500 MG tablet; Take 1 tablet (500 mg) by mouth 2 times daily as needed for moderate pain.    Effusion of right knee  -     naproxen (NAPROSYN) 500 MG tablet; Take 1 tablet (500 mg) by mouth 2 times daily as needed for moderate pain.      This issue is acute on chronic and Worsening.        ICD-10-CM    1. Arthritis of knee  M17.10 XR Knee Right 1/2 Views     naproxen (NAPROSYN) 500 MG tablet      2. Effusion of right knee  M25.461 naproxen (NAPROSYN) 500 MG tablet        Patient Instructions   Discussed nature of degenerative arthrosis of the knee. Discussed symptom treatment with over-the-counter medications, ice or heat, topical treatments, and rest if needed. Discussed use of sleeve or wrap for comfort. Discussed benefits of exercise and physical therapy. Discussed injection therapy. Also briefly discussed future consideration of referral to orthopedic surgery for further evaluation and discussion of arthroplasty.    Discussed repeat corticosteroid injection given flare of arthritis with effusion. Discussed landmark guided injection today v. follow up tomorrow for aspiration and injection. Patient will keep tomorrow's appointment with Dr. Cervantes.    Plan:  - Today's Plan of Care:  Keep appointment tomorrow - would recommend US guided aspiration and knee corticosteroid injection    Will trial Naproxen (STOP ibuprofen)  as needed (I reviewed the mechanism of action as well as risk/benefit profile of medications. Questions answered.)    Continue with relative rest and activity  "modification, Ice, Compression, and Elevation.  Can apply ice 10-15 minutes 3-4 times per day as needed. OTC medications as needed.    Home Exercise Program    -We also discussed other future treatment options:  Consideration of US guided Hyaluronic Acid Injection (call first, requires insurance approval)  Referral to Orthopedic Surgery    Follow Up: as needed  Can continue to follow up with me for further treatment recommendations    Concerning signs and symptoms were reviewed and all questions were answered at this time.    Mattie Yates MD [facility]  Sports Medicine Physician  [facility]    -----  Chief Complaint   Patient presents with     Right Knee - Pain       SUBJECTIVE  Stevendenver Buck is a/an 64 year old male who is seen as a self referral WALK IN for evaluation of right knee.     The patient is seen by themselves.    Onset: 1 day(s) ago, reports insidious onset without acute precipitating event - recalls that he had a buckling issues about 7-8 weeks ago with walking, swelling has increasingly getting worse    Location of Pain: right knee, both anterior and posterior knee  Worsened by: Walking, stairs, twisting or turning. Getting in or out of a car or truck  Better with: nothing, rest  Treatments tried: ice, copperfit compression sleeve  Associated symptoms: swelling    Orthopedic/Surgical history:  Last seen Dr. Wick where bilateral knee US Guided steroid injections were performed on 5/7/24 (notes a couple of months of relief), no aspiration at that time  Social History/Occupation: Retired, part-time works as a       REVIEW OF SYSTEMS:  Review of Systems    OBJECTIVE:  Ht 1.753 m (5' 9\")   Wt 94.8 kg (209 lb)   BMI 30.86 kg/m     General: healthy, alert and in no distress  Skin: no suspicious lesions or rash.  CV: distal perfusion intact   Resp: normal respiratory effort without conversational dyspnea   Psych: normal mood and affect  Gait: NORMAL  Neuro: Normal light " sensory exam of lower extremity    Right Knee exam    Inspection:      moderate effusion right    Patella:      Crepitus noted in the patellofemoral joint right    Tender:      medial patellar border right       lateral patellar border right       medial joint line right       lateral joint line right    Non Tender:      remainder of knee area right    Knee ROM:      Range of motion limited by 0 deg and 30 deg right    Hip ROM:     Full active and passive ROM bilateral    Strength:      5-/5 with knee extension right    Special Tests:     pain with Rach right       neg (-) anterior drawer right       neg (-) posterior drawer right       neg (-) varus at 0 deg and 30 deg right       neg (-) valgus at 0 deg and 30 deg right    Gait:      normal    Neurovascular:      2+ peripheral pulses bilaterally and brisk capillary refill       sensation grossly intact     RADIOLOGY:  Final results and radiologist's interpretation, available in the [facility] health record.  Images were reviewed with the patient in the office today.  My personal interpretation of the performed imaging:     [place] bilateral and lateral right knee XR views reviewed compared to prior AP bilateral XRs 4/19/2024: no acute bony abnormality, significant tricompartmental degenerative changes  - will follow official read    KNEE BILATERAL ONE TO TWO VIEWS  4/19/2024 9:24 AM   HISTORY: Bilateral knee pain, right greater than left. Arthralgia of  both knees.  COMPARISON: 7/10/2018.                                                           IMPRESSION: Single AP view of both knees shows advanced medial  compartment arthrosis right knee and mild to moderate joint space  narrowing involving the left knee.   Atherosclerotic vascular calcifications.   GILDA BLANK MD     Review of the result(s) of each unique test - XRs             Again, thank you for allowing me to participate in the care of your patient.        Sincerely,        Mattie Yates MD

## 2024-12-12 NOTE — Clinical Note
This patient already had an appointment with you tomorrow, walked in today. I think he would still benefit from US guided aspiration and injection so he's keeping the appointment tomorrow for the procedure only. I don't think you need an official referral order/ note but let me know if you do. Mattie!

## 2024-12-12 NOTE — PROGRESS NOTES
ASSESSMENT & PLAN    Jarred was seen today for pain.    Diagnoses and all orders for this visit:    Arthritis of knee  -     XR Knee Right 1/2 Views; Future  -     naproxen (NAPROSYN) 500 MG tablet; Take 1 tablet (500 mg) by mouth 2 times daily as needed for moderate pain.    Effusion of right knee  -     naproxen (NAPROSYN) 500 MG tablet; Take 1 tablet (500 mg) by mouth 2 times daily as needed for moderate pain.      This issue is acute on chronic and Worsening.        ICD-10-CM    1. Arthritis of knee  M17.10 XR Knee Right 1/2 Views     naproxen (NAPROSYN) 500 MG tablet      2. Effusion of right knee  M25.461 naproxen (NAPROSYN) 500 MG tablet        Patient Instructions   Discussed nature of degenerative arthrosis of the knee. Discussed symptom treatment with over-the-counter medications, ice or heat, topical treatments, and rest if needed. Discussed use of sleeve or wrap for comfort. Discussed benefits of exercise and physical therapy. Discussed injection therapy. Also briefly discussed future consideration of referral to orthopedic surgery for further evaluation and discussion of arthroplasty.    Discussed repeat corticosteroid injection given flare of arthritis with effusion. Discussed landmark guided injection today v. follow up tomorrow for aspiration and injection. Patient will keep tomorrow's appointment with Dr. Cervantes.    Plan:  - Today's Plan of Care:  Keep appointment tomorrow - would recommend US guided aspiration and knee corticosteroid injection    Will trial Naproxen (STOP ibuprofen)  as needed (I reviewed the mechanism of action as well as risk/benefit profile of medications. Questions answered.)    Continue with relative rest and activity modification, Ice, Compression, and Elevation.  Can apply ice 10-15 minutes 3-4 times per day as needed. OTC medications as needed.    Home Exercise Program    -We also discussed other future treatment options:  Consideration of US guided Hyaluronic Acid  "Injection (call first, requires insurance approval)  Referral to Orthopedic Surgery    Follow Up: as needed  Can continue to follow up with me for further treatment recommendations    Concerning signs and symptoms were reviewed and all questions were answered at this time.    Mattie Yates MD [facility]  Sports Medicine Physician  [facility]    -----  Chief Complaint   Patient presents with    Right Knee - Pain       SUBJECTIVE  Steven Buck is a/an 64 year old male who is seen as a self referral WALK IN for evaluation of right knee.     The patient is seen by themselves.    Onset: 1 day(s) ago, reports insidious onset without acute precipitating event - recalls that he had a buckling issues about 7-8 weeks ago with walking, swelling has increasingly getting worse    Location of Pain: right knee, both anterior and posterior knee  Worsened by: Walking, stairs, twisting or turning. Getting in or out of a car or truck  Better with: nothing, rest  Treatments tried: ice, copperfit compression sleeve  Associated symptoms: swelling    Orthopedic/Surgical history:  Last seen Dr. Wick where bilateral knee US Guided steroid injections were performed on 5/7/24 (notes a couple of months of relief), no aspiration at that time  Social History/Occupation: Retired, part-time works as a       REVIEW OF SYSTEMS:  Review of Systems    OBJECTIVE:  Ht 1.753 m (5' 9\")   Wt 94.8 kg (209 lb)   BMI 30.86 kg/m     General: healthy, alert and in no distress  Skin: no suspicious lesions or rash.  CV: distal perfusion intact   Resp: normal respiratory effort without conversational dyspnea   Psych: normal mood and affect  Gait: NORMAL  Neuro: Normal light sensory exam of lower extremity    Right Knee exam    Inspection:      moderate effusion right    Patella:      Crepitus noted in the patellofemoral joint right    Tender:      medial patellar border right       lateral patellar border right       medial joint " line right       lateral joint line right    Non Tender:      remainder of knee area right    Knee ROM:      Range of motion limited by 0 deg and 30 deg right    Hip ROM:     Full active and passive ROM bilateral    Strength:      5-/5 with knee extension right    Special Tests:     pain with Rach right       neg (-) anterior drawer right       neg (-) posterior drawer right       neg (-) varus at 0 deg and 30 deg right       neg (-) valgus at 0 deg and 30 deg right    Gait:      normal    Neurovascular:      2+ peripheral pulses bilaterally and brisk capillary refill       sensation grossly intact     RADIOLOGY:  Final results and radiologist's interpretation, available in the [facility] health record.  Images were reviewed with the patient in the office today.  My personal interpretation of the performed imaging:     [place] bilateral and lateral right knee XR views reviewed compared to prior AP bilateral XRs 4/19/2024: no acute bony abnormality, significant tricompartmental degenerative changes  - will follow official read    KNEE BILATERAL ONE TO TWO VIEWS  4/19/2024 9:24 AM   HISTORY: Bilateral knee pain, right greater than left. Arthralgia of  both knees.  COMPARISON: 7/10/2018.                                                           IMPRESSION: Single AP view of both knees shows advanced medial  compartment arthrosis right knee and mild to moderate joint space  narrowing involving the left knee.   Atherosclerotic vascular calcifications.   GILDA BLANK MD     Review of the result(s) of each unique test - XRs

## 2024-12-12 NOTE — PATIENT INSTRUCTIONS
Discussed nature of degenerative arthrosis of the knee. Discussed symptom treatment with over-the-counter medications, ice or heat, topical treatments, and rest if needed. Discussed use of sleeve or wrap for comfort. Discussed benefits of exercise and physical therapy. Discussed injection therapy. Also briefly discussed future consideration of referral to orthopedic surgery for further evaluation and discussion of arthroplasty.    Discussed repeat corticosteroid injection given flare of arthritis with effusion. Discussed landmark guided injection today v. follow up tomorrow for aspiration and injection. Patient will keep tomorrow's appointment with Dr. Cervantes.    Plan:  - Today's Plan of Care:  Keep appointment tomorrow - would recommend US guided aspiration and knee corticosteroid injection    Will trial Naproxen (STOP ibuprofen)  as needed (I reviewed the mechanism of action as well as risk/benefit profile of medications. Questions answered.)    Continue with relative rest and activity modification, Ice, Compression, and Elevation.  Can apply ice 10-15 minutes 3-4 times per day as needed. OTC medications as needed.    Home Exercise Program    -We also discussed other future treatment options:  Consideration of US guided Hyaluronic Acid Injection (call first, requires insurance approval)  Referral to Orthopedic Surgery    Follow Up: as needed  Can continue to follow up with me for further treatment recommendations    If you have any further questions for your physician or physician s care team you can call [phone].

## 2025-01-08 ENCOUNTER — TELEPHONE (OUTPATIENT)
Dept: GASTROENTEROLOGY | Facility: CLINIC | Age: 65
End: 2025-01-08
Payer: COMMERCIAL

## 2025-01-08 NOTE — TELEPHONE ENCOUNTER
Endoscopy Scheduling Screen      What insurance is in the chart?  Other:  [facility]    Ordering/Referring Provider: Lety Barger   (If ordering provider performs procedure, schedule with ordering provider unless otherwise instructed. )    BMI: There is no height or weight on file to calculate BMI.  30.86    Sedation Ordered  general anesthesia.   BMI<= 45 45 < BMI <= 48 48 < BMI < = 50  BMI > 50   No Restrictions No [facility]  No [facility]  [facility] with exceptions Hospital Only OR Only       Do you have a history of malignant hyperthermia?  NO    (Females) Are you currently pregnant?   NO     Are you currently on dialysis?   NO    Do you need assistance transferring?   NO    BMI: There is no height or weight on file to calculate BMI.     Is patients BMI > 50?  NO    BMI > 40?  NO    Do you have a diagnosis of diabetes?  NO    Do you take an injectable medication for weight loss or diabetes (excluding insulin)?  NO    Do you take the medication Naltrexone?  NO    Do you take blood thinners?  NO    Prep   Are you currently on dialysis or do you have chronic kidney disease?  NO    Do you have a diagnosis of cystic fibrosis (CF)?  NO    On a regular basis do you go 3 -5 days between bowel movements?  NO    Preferred Pharmacy:    [facility] [place], [place] - [address]  [address] [address]  Phone: [phone] Fax: [phone] Alternate Fax: [phone], [phone]    [id number] - [place], [place] - [address]  [address] [address]  Phone: [phone] Fax: [phone]    [facility]  User for all [employer] all locations.  New  [facility] pt must call [phone] to  verify address.  Phone: [phone] Fax: [phone]    [facility] - [place], [place] - [address]  [address] [address]  Phone: [phone] Fax: [phone]    [facility] - [facility]  [place], [place] - [address]  [address] [address]  Phone: [phone] Fax: [phone]      Final Scheduling Details     Procedure scheduled  Colonoscopy    Surgeon:  Xavier     Date of procedure:  3-14-25     Location  [place]       What is your communication preference for Instructions and/or Bowel Prep?   [employer]    Patient Reminders:    You will receive a call from a Nurse to review instructions and health history.  This assessment must be completed prior to your procedure.  Failure to complete the Nurse assessment may result in the procedure being cancelled.       On the day of your procedure, please designate an adult(s) who can drive you home stay with you for the next 24 hours. The medicines used in the exam will make you sleepy. You will not be able to drive.       You cannot take public transportation, ride share services, or non-medical taxi service without a responsible caregiver.  Medical transport services are allowed with the requirement that a responsible caregiver will receive you at your destination.  We require that drivers and caregivers are confirmed prior to your procedure.

## 2025-02-25 ENCOUNTER — MYC MEDICAL ADVICE (OUTPATIENT)
Dept: FAMILY MEDICINE | Facility: CLINIC | Age: 65
End: 2025-02-25
Payer: COMMERCIAL

## 2025-02-25 NOTE — TELEPHONE ENCOUNTER
Replied to patient asking if he wanted to wait to do the testing until his appt in April and offered E-visit to get order.    Anette WASHBURN LPN

## 2025-02-28 ENCOUNTER — MYC MEDICAL ADVICE (OUTPATIENT)
Dept: FAMILY MEDICINE | Facility: CLINIC | Age: 65
End: 2025-02-28
Payer: COMMERCIAL

## 2025-03-03 ENCOUNTER — TELEPHONE (OUTPATIENT)
Dept: FAMILY MEDICINE | Facility: CLINIC | Age: 65
End: 2025-03-03
Payer: COMMERCIAL

## 2025-03-03 NOTE — TELEPHONE ENCOUNTER
Patient Quality Outreach    Patient is due for the following:   Colon Cancer Screening    Action(s) Taken:   No follow up needed at this time.    Type of outreach:    None - scheduled     Questions for provider review:    None           Juanito Velasquez, CMA

## 2025-03-14 ENCOUNTER — HOSPITAL ENCOUNTER (OUTPATIENT)
Facility: CLINIC | Age: 65
Discharge: HOME | End: 2025-03-14
Attending: SURGERY | Admitting: SURGERY
Payer: COMMERCIAL

## 2025-03-14 VITALS
SYSTOLIC BLOOD PRESSURE: 136 MMHG | DIASTOLIC BLOOD PRESSURE: 90 MMHG | HEART RATE: 82 BPM | TEMPERATURE: 97.4 F | OXYGEN SATURATION: 98 % | RESPIRATION RATE: 18 BRPM

## 2025-03-14 LAB — COLONOSCOPY: NORMAL

## 2025-03-14 PROCEDURE — 45385 COLONOSCOPY W/LESION REMOVAL: CPT | Mod: PT | Performed by: SURGERY

## 2025-03-14 PROCEDURE — 88305 TISSUE EXAM BY PATHOLOGIST: CPT | Mod: TC | Performed by: SURGERY

## 2025-03-14 PROCEDURE — 258N000003 HC RX IP 258 OP 636: Performed by: SURGERY

## 2025-03-14 PROCEDURE — 370N000017 HC ANESTHESIA TECHNICAL FEE, PER MIN: Performed by: SURGERY

## 2025-03-14 PROCEDURE — 88305 TISSUE EXAM BY PATHOLOGIST: CPT | Mod: 26 | Performed by: PATHOLOGY

## 2025-03-14 PROCEDURE — 45380 COLONOSCOPY AND BIOPSY: CPT | Mod: XU,PT | Performed by: SURGERY

## 2025-03-14 RX ORDER — ONDANSETRON 2 MG/ML
4 INJECTION INTRAMUSCULAR; INTRAVENOUS EVERY 30 MIN PRN
Status: DISCONTINUED | OUTPATIENT
Start: 2025-03-14 | End: 2025-03-14 | Stop reason: HOSPADM

## 2025-03-14 RX ORDER — SODIUM CHLORIDE, SODIUM LACTATE, POTASSIUM CHLORIDE, CALCIUM CHLORIDE 600; 310; 30; 20 MG/100ML; MG/100ML; MG/100ML; MG/100ML
INJECTION, SOLUTION INTRAVENOUS CONTINUOUS
Status: DISCONTINUED | OUTPATIENT
Start: 2025-03-14 | End: 2025-03-14 | Stop reason: HOSPADM

## 2025-03-14 RX ORDER — OXYCODONE HYDROCHLORIDE 5 MG/1
10 TABLET ORAL
Status: DISCONTINUED | OUTPATIENT
Start: 2025-03-14 | End: 2025-03-14 | Stop reason: HOSPADM

## 2025-03-14 RX ORDER — ONDANSETRON 4 MG/1
4 TABLET, ORALLY DISINTEGRATING ORAL EVERY 30 MIN PRN
Status: DISCONTINUED | OUTPATIENT
Start: 2025-03-14 | End: 2025-03-14 | Stop reason: HOSPADM

## 2025-03-14 RX ORDER — ONDANSETRON 2 MG/ML
4 INJECTION INTRAMUSCULAR; INTRAVENOUS
Status: DISCONTINUED | OUTPATIENT
Start: 2025-03-14 | End: 2025-03-14 | Stop reason: HOSPADM

## 2025-03-14 RX ORDER — NALOXONE HYDROCHLORIDE 0.4 MG/ML
0.1 INJECTION, SOLUTION INTRAMUSCULAR; INTRAVENOUS; SUBCUTANEOUS
Status: DISCONTINUED | OUTPATIENT
Start: 2025-03-14 | End: 2025-03-14 | Stop reason: HOSPADM

## 2025-03-14 RX ORDER — DEXAMETHASONE SODIUM PHOSPHATE 4 MG/ML
4 INJECTION, SOLUTION INTRA-ARTICULAR; INTRALESIONAL; INTRAMUSCULAR; INTRAVENOUS; SOFT TISSUE
Status: DISCONTINUED | OUTPATIENT
Start: 2025-03-14 | End: 2025-03-14 | Stop reason: HOSPADM

## 2025-03-14 RX ORDER — OXYCODONE HYDROCHLORIDE 5 MG/1
5 TABLET ORAL
Status: DISCONTINUED | OUTPATIENT
Start: 2025-03-14 | End: 2025-03-14 | Stop reason: HOSPADM

## 2025-03-14 RX ORDER — LIDOCAINE 40 MG/G
CREAM TOPICAL
Status: DISCONTINUED | OUTPATIENT
Start: 2025-03-14 | End: 2025-03-14 | Stop reason: HOSPADM

## 2025-03-14 RX ADMIN — SODIUM CHLORIDE, POTASSIUM CHLORIDE, SODIUM LACTATE AND CALCIUM CHLORIDE: 600; 310; 30; 20 INJECTION, SOLUTION INTRAVENOUS at 07:49

## 2025-03-14 ASSESSMENT — ACTIVITIES OF DAILY LIVING (ADL)
ADLS_ACUITY_SCORE: 41
ADLS_ACUITY_SCORE: 41

## 2025-03-14 NOTE — H&P
[facility]    Pre-Endoscopy History and Physical     Steven Buck MRN# [id number]   YOB: 1960 Age: 64 year old     Date of Procedure: 3/14/2025  Primary care provider: Lety Barger  Type of Endoscopy: Colonoscopy with possible biopsy, possible polypectomy  Reason for Procedure: Screening colonoscopy  Type of Anesthesia Anticipated: Conscious Sedation    HPI:    Steven is a 64 year old male who will be undergoing the above procedure.      A history and physical has been performed. The patient's medications and allergies have been reviewed. The risks and benefits of the procedure and the sedation options and risks were discussed with the patient.  All questions were answered and informed consent was obtained.      He denies a personal or family history of anesthesia complications or bleeding disorders. Denies blood thinners.    Last colonoscopy 2013, demonstrated tubular adenoma of sigmoid colon. Endorses family history of colon cancer, maternal grandfather. Surgical history significant for vasectomy.    Denies nausea, emesis, abdominal pain, diarrhea, constipation, hematochezia, melena.    Patient Active Problem List   Diagnosis    Hyperlipidemia LDL goal <100    Hypertension goal BP (blood pressure) < 140/90    History of Clostridium difficile infection    Acute left-sided low back pain with left-sided sciatica    Arthralgia of both knees        Past Medical History:   Diagnosis Date    Hypertension         Past Surgical History:   Procedure Laterality Date    ORTHOPEDIC SURGERY      REPAIR TENDON BICEPS DISTAL UPPER EXTREMITY  6/27/2014    Procedure: REPAIR TENDON BICEPS DISTAL UPPER EXTREMITY;  Surgeon: Ryan Joe MD;  Location: [place] OR    SURGICAL HISTORY OF -   1995    RIGHT knee arthroscopic surgery    SURGICAL HISTORY OF -   1/16/97    vasectomy       Social History     Tobacco Use    Smoking status: Former     Current packs/day: 0.00     Types:  "Cigarettes     Start date: 1985     Quit date: 1986     Years since quittin.2    Smokeless tobacco: Never   Substance Use Topics    Alcohol use: Yes     Alcohol/week: 0.0 standard drinks of alcohol     Comment: a few drinks now and then       Family History   Problem Relation Age of Onset    Hypertension Mother     Hypertension Father     C.A.D. Father     Cardiovascular Father     Hypertension Maternal Grandmother     Hypertension Maternal Grandfather     Hypertension Brother        Prior to Admission medications    Medication Sig Start Date End Date Taking? Authorizing Provider   amLODIPine (NORVASC) 10 MG tablet Take 1 tablet (10 mg) by mouth daily 24  Yes Lety Barger DNP   atorvastatin (LIPITOR) 20 MG tablet Take 1 tablet (20 mg) by mouth daily 24  Yes Lety Barger DNP   lisinopril-hydrochlorothiazide (ZESTORETIC) 20-25 MG tablet Take 1 tablet by mouth daily 24  Yes Lety Barger DNP   Multiple Vitamins-Minerals (ONE-A-DAY 50 PLUS PO) Take 1 tablet by mouth daily   Yes Reported, Patient   naproxen (NAPROSYN) 500 MG tablet Take 1 tablet (500 mg) by mouth 2 times daily as needed for moderate pain. 24  Yes Mattie Yates MD   Omega-3 Fatty Acids (FISH OIL BURP-LESS PO) Take 1 tablet by mouth every morning   Yes Reported, Patient       Allergies   Allergen Reactions    Nkda [No Known Drug Allergy]         REVIEW OF SYSTEMS:   5 point ROS negative except as noted above in HPI, including Gen., Resp., CV, GI &  system review.    PHYSICAL EXAM:   There were no vitals taken for this visit. Estimated body mass index is 30.86 kg/m  as calculated from the following:    Height as of 24: 1.753 m (5' 9\").    Weight as of 24: 94.8 kg (209 lb).   Constitutional: Awake, alert, no acute distress.  Eyes: No scleral icterus.  Conjunctiva are without injection.  ENMT: Mucous membranes moist, dentition and gums are intact.   Neck: Soft, supple, trachea " midline.    Endocrine: n/a   Lymphatic: There is no cervical, submandibularadenopathy.  Respiratory: normal effortgs   Cardiovascular: S1, S2  Abdomen: Non-distended, non-tender,  No masses,  Musculoskeletal: No spinal or CVA tenderness. Full range of motion in the upper and lower extremities.    Skin: No skin rashes or lesions to inspection.  No petechia.    Neurologic: alerted and oriented 3x  Psychiatric: The patient's affect is not blunted and mood is appropriate.  DIAGNOSTICS:    Not indicated    IMPRESSION   ASA Class 2 - Mild systemic disease    PLAN:   Plan for Colonoscopy with possible biopsy, possible polypectomy. We discussed the risks, benefits and alternatives and the patient wished to proceed.  Patient is cleared for the above procedure.    The above has been forwarded to the consulting provider.    The above has been discussed with attending physician. Any changes will be made in the attending attestation statement/addendum.    Shelby Nelson DO PGY-2  [facility] General Surgery Residency Program  [facility]

## 2025-03-17 LAB
PATH REPORT.COMMENTS IMP SPEC: NORMAL
PATH REPORT.COMMENTS IMP SPEC: NORMAL
PATH REPORT.FINAL DX SPEC: NORMAL
PATH REPORT.GROSS SPEC: NORMAL
PATH REPORT.MICROSCOPIC SPEC OTHER STN: NORMAL
PATH REPORT.RELEVANT HX SPEC: NORMAL
PHOTO IMAGE: NORMAL

## 2025-03-25 NOTE — TELEPHONE ENCOUNTER
Occupational Therapy                 Therapy Communication Note    Patient Name: Manolo Bashir  MRN: [id number]  [facility]  Room: 5021/5021-A  Today's Date: 3/25/2025     Discipline: Occupational Therapy    OT Missed Visit: Yes     Missed Visit Reason: Missed Visit Reason:  (off unit at dialysis @ 12:40)    Missed Time: Attempt   Patient did an e visit for diarrhea - stool studies ordered.  Place stool supplies at  for patient to .    MARY Ferraro

## 2025-03-28 PROBLEM — D12.6 COLON ADENOMA: Status: ACTIVE | Noted: 2025-03-28

## 2025-03-31 ENCOUNTER — PATIENT OUTREACH (OUTPATIENT)
Dept: GASTROENTEROLOGY | Facility: CLINIC | Age: 65
End: 2025-03-31
Payer: COMMERCIAL

## 2025-04-17 SDOH — HEALTH STABILITY: PHYSICAL HEALTH: ON AVERAGE, HOW MANY DAYS PER WEEK DO YOU ENGAGE IN MODERATE TO STRENUOUS EXERCISE (LIKE A BRISK WALK)?: 4 DAYS

## 2025-04-17 SDOH — HEALTH STABILITY: PHYSICAL HEALTH: ON AVERAGE, HOW MANY MINUTES DO YOU ENGAGE IN EXERCISE AT THIS LEVEL?: 30 MIN

## 2025-04-17 ASSESSMENT — SOCIAL DETERMINANTS OF HEALTH (SDOH): HOW OFTEN DO YOU GET TOGETHER WITH FRIENDS OR RELATIVES?: TWICE A WEEK

## 2025-04-22 ENCOUNTER — ANCILLARY PROCEDURE (OUTPATIENT)
Dept: GENERAL RADIOLOGY | Facility: CLINIC | Age: 65
End: 2025-04-22
Attending: NURSE PRACTITIONER
Payer: COMMERCIAL

## 2025-04-22 ENCOUNTER — OFFICE VISIT (OUTPATIENT)
Dept: FAMILY MEDICINE | Facility: CLINIC | Age: 65
End: 2025-04-22
Attending: NURSE PRACTITIONER
Payer: COMMERCIAL

## 2025-04-22 VITALS
WEIGHT: 210.5 LBS | HEIGHT: 68 IN | TEMPERATURE: 98.6 F | SYSTOLIC BLOOD PRESSURE: 138 MMHG | OXYGEN SATURATION: 96 % | BODY MASS INDEX: 31.9 KG/M2 | DIASTOLIC BLOOD PRESSURE: 90 MMHG | HEART RATE: 88 BPM

## 2025-04-22 DIAGNOSIS — Z11.4 ENCOUNTER FOR SCREENING FOR HIV: ICD-10-CM

## 2025-04-22 DIAGNOSIS — I10 HYPERTENSION GOAL BP (BLOOD PRESSURE) < 140/90: ICD-10-CM

## 2025-04-22 DIAGNOSIS — Z87.19 HISTORY OF DIVERTICULITIS: ICD-10-CM

## 2025-04-22 DIAGNOSIS — R20.0 NUMBNESS AND TINGLING IN BOTH HANDS: ICD-10-CM

## 2025-04-22 DIAGNOSIS — M25.552 HIP PAIN, LEFT: ICD-10-CM

## 2025-04-22 DIAGNOSIS — M16.12 ARTHRITIS OF LEFT HIP: Primary | ICD-10-CM

## 2025-04-22 DIAGNOSIS — Z13.6 SCREENING FOR CARDIOVASCULAR CONDITION: ICD-10-CM

## 2025-04-22 DIAGNOSIS — R20.2 NUMBNESS AND TINGLING IN BOTH HANDS: ICD-10-CM

## 2025-04-22 DIAGNOSIS — R53.83 OTHER FATIGUE: ICD-10-CM

## 2025-04-22 DIAGNOSIS — E78.5 HYPERLIPIDEMIA LDL GOAL <100: ICD-10-CM

## 2025-04-22 DIAGNOSIS — Z11.59 ENCOUNTER FOR HCV SCREENING TEST FOR LOW RISK PATIENT: ICD-10-CM

## 2025-04-22 DIAGNOSIS — M25.562 ARTHRALGIA OF BOTH KNEES: ICD-10-CM

## 2025-04-22 DIAGNOSIS — M25.561 ARTHRALGIA OF BOTH KNEES: ICD-10-CM

## 2025-04-22 DIAGNOSIS — Z00.00 ROUTINE GENERAL MEDICAL EXAMINATION AT A HEALTH CARE FACILITY: Primary | ICD-10-CM

## 2025-04-22 LAB
ALBUMIN SERPL BCG-MCNC: 4.8 G/DL (ref 3.5–5.2)
ALP SERPL-CCNC: 72 U/L (ref 40–150)
ALT SERPL W P-5'-P-CCNC: 39 U/L (ref 0–70)
ANION GAP SERPL CALCULATED.3IONS-SCNC: 11 MMOL/L (ref 7–15)
AST SERPL W P-5'-P-CCNC: 28 U/L (ref 0–45)
BILIRUB SERPL-MCNC: 0.7 MG/DL
BUN SERPL-MCNC: 14.9 MG/DL (ref 8–23)
CALCIUM SERPL-MCNC: 9.8 MG/DL (ref 8.8–10.4)
CHLORIDE SERPL-SCNC: 103 MMOL/L (ref 98–107)
CHOLEST SERPL-MCNC: 222 MG/DL
CREAT SERPL-MCNC: 0.9 MG/DL (ref 0.67–1.17)
EGFRCR SERPLBLD CKD-EPI 2021: >90 ML/MIN/1.73M2
ERYTHROCYTE [DISTWIDTH] IN BLOOD BY AUTOMATED COUNT: 12.6 % (ref 10–15)
FASTING STATUS PATIENT QL REPORTED: YES
FASTING STATUS PATIENT QL REPORTED: YES
FERRITIN SERPL-MCNC: 351 NG/ML (ref 31–409)
GLUCOSE SERPL-MCNC: 102 MG/DL (ref 70–99)
HCO3 SERPL-SCNC: 29 MMOL/L (ref 22–29)
HCT VFR BLD AUTO: 45.2 % (ref 40–53)
HCV AB SERPL QL IA: NONREACTIVE
HDLC SERPL-MCNC: 101 MG/DL
HGB BLD-MCNC: 15.5 G/DL (ref 13.3–17.7)
HIV 1+2 AB+HIV1 P24 AG SERPL QL IA: NONREACTIVE
LDLC SERPL CALC-MCNC: 109 MG/DL
MCH RBC QN AUTO: 30.3 PG (ref 26.5–33)
MCHC RBC AUTO-ENTMCNC: 34.3 G/DL (ref 31.5–36.5)
MCV RBC AUTO: 89 FL (ref 78–100)
NONHDLC SERPL-MCNC: 121 MG/DL
PLATELET # BLD AUTO: 250 10E3/UL (ref 150–450)
POTASSIUM SERPL-SCNC: 4.1 MMOL/L (ref 3.4–5.3)
PROT SERPL-MCNC: 7.6 G/DL (ref 6.4–8.3)
RBC # BLD AUTO: 5.11 10E6/UL (ref 4.4–5.9)
SODIUM SERPL-SCNC: 143 MMOL/L (ref 135–145)
TRIGL SERPL-MCNC: 59 MG/DL
VIT B12 SERPL-MCNC: 458 PG/ML (ref 232–1245)
WBC # BLD AUTO: 5.6 10E3/UL (ref 4–11)

## 2025-04-22 PROCEDURE — 99214 OFFICE O/P EST MOD 30 MIN: CPT | Mod: 25 | Performed by: NURSE PRACTITIONER

## 2025-04-22 PROCEDURE — 82607 VITAMIN B-12: CPT | Performed by: NURSE PRACTITIONER

## 2025-04-22 PROCEDURE — 90677 PCV20 VACCINE IM: CPT | Performed by: NURSE PRACTITIONER

## 2025-04-22 PROCEDURE — 86803 HEPATITIS C AB TEST: CPT | Performed by: NURSE PRACTITIONER

## 2025-04-22 PROCEDURE — 36415 COLL VENOUS BLD VENIPUNCTURE: CPT | Performed by: NURSE PRACTITIONER

## 2025-04-22 PROCEDURE — 3080F DIAST BP >= 90 MM HG: CPT | Performed by: NURSE PRACTITIONER

## 2025-04-22 PROCEDURE — 3075F SYST BP GE 130 - 139MM HG: CPT | Performed by: NURSE PRACTITIONER

## 2025-04-22 PROCEDURE — 85027 COMPLETE CBC AUTOMATED: CPT | Performed by: NURSE PRACTITIONER

## 2025-04-22 PROCEDURE — 87389 HIV-1 AG W/HIV-1&-2 AB AG IA: CPT | Performed by: NURSE PRACTITIONER

## 2025-04-22 PROCEDURE — 90471 IMMUNIZATION ADMIN: CPT | Performed by: NURSE PRACTITIONER

## 2025-04-22 PROCEDURE — 99396 PREV VISIT EST AGE 40-64: CPT | Mod: 25 | Performed by: NURSE PRACTITIONER

## 2025-04-22 PROCEDURE — 73502 X-RAY EXAM HIP UNI 2-3 VIEWS: CPT | Mod: TC | Performed by: RADIOLOGY

## 2025-04-22 PROCEDURE — 80061 LIPID PANEL: CPT | Performed by: NURSE PRACTITIONER

## 2025-04-22 PROCEDURE — 86618 LYME DISEASE ANTIBODY: CPT | Performed by: NURSE PRACTITIONER

## 2025-04-22 PROCEDURE — 80053 COMPREHEN METABOLIC PANEL: CPT | Performed by: NURSE PRACTITIONER

## 2025-04-22 PROCEDURE — 1125F AMNT PAIN NOTED PAIN PRSNT: CPT | Performed by: NURSE PRACTITIONER

## 2025-04-22 PROCEDURE — 82728 ASSAY OF FERRITIN: CPT | Performed by: NURSE PRACTITIONER

## 2025-04-22 RX ORDER — LISINOPRIL AND HYDROCHLOROTHIAZIDE 20; 25 MG/1; MG/1
1 TABLET ORAL DAILY
Qty: 90 TABLET | Refills: 3 | Status: SHIPPED | OUTPATIENT
Start: 2025-04-22

## 2025-04-22 RX ORDER — AMLODIPINE BESYLATE 10 MG/1
10 TABLET ORAL DAILY
Qty: 90 TABLET | Refills: 3 | Status: SHIPPED | OUTPATIENT
Start: 2025-04-22

## 2025-04-22 RX ORDER — ATORVASTATIN CALCIUM 20 MG/1
20 TABLET, FILM COATED ORAL DAILY
Qty: 90 TABLET | Refills: 3 | Status: SHIPPED | OUTPATIENT
Start: 2025-04-22

## 2025-04-22 ASSESSMENT — PAIN SCALES - GENERAL: PAINLEVEL_OUTOF10: MILD PAIN (2)

## 2025-04-22 NOTE — NURSING NOTE
Prior to immunization administration, verified patients identity using patient s name and date of birth. Please see Immunization Activity for additional information.     Screening Questionnaire for Adult Immunization    Are you sick today?   No   Do you have allergies to medications, food, a vaccine component or latex?   No   Have you ever had a serious reaction after receiving a vaccination?   No   Do you have a long-term health problem with heart, lung, kidney, or metabolic disease (e.g., diabetes), asthma, a blood disorder, no spleen, complement component deficiency, a cochlear implant, or a spinal fluid leak?  Are you on long-term aspirin therapy?   No   Do you have cancer, leukemia, HIV/AIDS, or any other immune system problem?   No   Do you have a parent, brother, or sister with an immune system problem?   No   In the past 3 months, have you taken medications that affect  your immune system, such as prednisone, other steroids, or anticancer drugs; drugs for the treatment of rheumatoid arthritis, Crohn s disease, or psoriasis; or have you had radiation treatments?   No   Have you had a seizure, or a brain or other nervous system problem?   No   During the past year, have you received a transfusion of blood or blood    products, or been given immune (gamma) globulin or antiviral drug?   No   For women: Are you pregnant or is there a chance you could become       pregnant during the next month?   No   Have you received any vaccinations in the past 4 weeks?   No     Immunization questionnaire answers were all negative.      Patient instructed to remain in clinic for 15 minutes afterwards, and to report any adverse reactions.     Screening performed by Rebecca Taylor MA on 4/22/2025 at 8:52 AM.

## 2025-04-22 NOTE — PATIENT INSTRUCTIONS
Patient Education   Preventive Care Advice   This is general advice given by our system to help you stay healthy. However, your care team may have specific advice just for you. Please talk to your care team about your preventive care needs.  Nutrition  Eat 5 or more servings of fruits and vegetables each day.  Try wheat bread, brown rice and whole grain pasta (instead of white bread, rice, and pasta).  Get enough calcium and vitamin D. Check the label on foods and aim for 100% of the RDA (recommended daily allowance).  Lifestyle  Exercise at least 150 minutes each week  (30 minutes a day, 5 days a week).  Do muscle strengthening activities 2 days a week. These help control your weight and prevent disease.  No smoking.  Wear sunscreen to prevent skin cancer.  Have a dental exam and cleaning every 6 months.  Yearly exams  See your health care team every year to talk about:  Any changes in your health.  Any medicines your care team has prescribed.  Preventive care, family planning, and ways to prevent chronic diseases.  Shots (vaccines)   HPV shots (up to age 26), if you've never had them before.  Hepatitis B shots (up to age 59), if you've never had them before.  COVID-19 shot: Get this shot when it's due.  Flu shot: Get a flu shot every year.  Tetanus shot: Get a tetanus shot every 10 years.  Pneumococcal, hepatitis A, and RSV shots: Ask your care team if you need these based on your risk.  Shingles shot (for age 50 and up)  General health tests  Diabetes screening:  Starting at age 35, Get screened for diabetes at least every 3 years.  If you are younger than age 35, ask your care team if you should be screened for diabetes.  Cholesterol test: At age 39, start having a cholesterol test every 5 years, or more often if advised.  Bone density scan (DEXA): At age 50, ask your care team if you should have this scan for osteoporosis (brittle bones).  Hepatitis C: Get tested at least once in your life.  STIs (sexually  transmitted infections)  Before age 24: Ask your care team if you should be screened for STIs.  After age 24: Get screened for STIs if you're at risk. You are at risk for STIs (including HIV) if:  You are sexually active with more than one person.  You don't use condoms every time.  You or a partner was diagnosed with a sexually transmitted infection.  If you are at risk for HIV, ask about PrEP medicine to prevent HIV.  Get tested for HIV at least once in your life, whether you are at risk for HIV or not.  Cancer screening tests  Cervical cancer screening: If you have a cervix, begin getting regular cervical cancer screening tests starting at age 21.  Breast cancer scan (mammogram): If you've ever had breasts, begin having regular mammograms starting at age 40. This is a scan to check for breast cancer.  Colon cancer screening: It is important to start screening for colon cancer at age 45.  Have a colonoscopy test every 10 years (or more often if you're at risk) Or, ask your provider about stool tests like a FIT test every year or Cologuard test every 3 years.  To learn more about your testing options, visit:   .  For help making a decision, visit:   https://bit.ly/lc07832.  Prostate cancer screening test: If you have a prostate, ask your care team if a prostate cancer screening test (PSA) at age 55 is right for you.  Lung cancer screening: If you are a current or former smoker ages 50 to 80, ask your care team if ongoing lung cancer screenings are right for you.  For informational purposes only. Not to replace the advice of your health care provider. Copyright   2023 [facility]. All rights reserved. Clinically reviewed by the [facility]itions Program. [employer] 682268 - REV 01/24.  Preventing Falls: Care Instructions  Injuries and health problems such as trouble walking or poor eyesight can increase your risk of falling. So can some medicines. But there are things you can do to help  "prevent falls. You can exercise to get stronger. You can also arrange your home to make it safer.    Talk to your doctor about the medicines you take. Ask if any of them increase the risk of falls and whether they can be changed or stopped.   Try to exercise regularly. It can help improve your strength and balance. This can help lower your risk of falling.         Practice fall safety and prevention.   Wear low-heeled shoes that fit well and give your feet good support. Talk to your doctor if you have foot problems that make this hard.  Carry a cellphone or wear a medical alert device that you can use to call for help.  Use stepladders instead of chairs to reach high objects. Don't climb if you're at risk for falls. Ask for help, if needed.  Wear the correct eyeglasses, if you need them.        Make your home safer.   Remove rugs, cords, clutter, and furniture from walkways.  Keep your house well lit. Use night-lights in hallways and bathrooms.  Install and use sturdy handrails on stairways.  Wear nonskid footwear, even inside. Don't walk barefoot or in socks without shoes.        Be safe outside.   Use handrails, curb cuts, and ramps whenever possible.  Keep your hands free by using a shoulder bag or backpack.  Try to walk in well-lit areas. Watch out for uneven ground, changes in pavement, and debris.  Be careful in the winter. Walk on the grass or gravel when sidewalks are slippery. Use de-icer on steps and walkways. Add non-slip devices to shoes.    Put grab bars and nonskid mats in your shower or tub and near the toilet. Try to use a shower chair or bath bench when bathing.   Get into a tub or shower by putting in your weaker leg first. Get out with your strong side first. Have a phone or medical alert device in the bathroom with you.   Where can you learn more?  Go to https://www.[employer].net/patiented  Enter G117 in the search box to learn more about \"Preventing Falls: Care Instructions.\"  Current as of: " July 31, 2024  Content Version: 14.4    5082-9040 [employer].   Care instructions adapted under license by your healthcare professional. If you have questions about a medical condition or this instruction, always ask your healthcare professional. [employer] disclaims any warranty or liability for your use of this information.    Learning About Stress  What is stress?     Stress is your body's response to a hard situation. Your body can have a physical, emotional, or mental response. Stress is a fact of life for most people, and it affects everyone differently. What causes stress for you may not be stressful for someone else.  A lot of things can cause stress. You may feel stress when you go on a job interview, take a test, or run a race. This kind of short-term stress is normal and even useful. It can help you if you need to work hard or react quickly. For example, stress can help you finish an important job on time.  Long-term stress is caused by ongoing stressful situations or events. Examples of long-term stress include long-term health problems, ongoing problems at work, or conflicts in your family. Long-term stress can harm your health.  How does stress affect your health?  When you are stressed, your body responds as though you are in danger. It makes hormones that speed up your heart, make you breathe faster, and give you a burst of energy. This is called the fight-or-flight stress response. If the stress is over quickly, your body goes back to normal and no harm is done.  But if stress happens too often or lasts too long, it can have bad effects. Long-term stress can make you more likely to get sick, and it can make symptoms of some diseases worse. If you tense up when you are stressed, you may develop neck, shoulder, or low back pain. Stress is linked to high blood pressure and heart disease.  Stress also harms your emotional health. It can make you berman, tense, or depressed. Your  relationships may suffer, and you may not do well at work or school.  What can you do to manage stress?  You can try these things to help manage stress:   Do something active. Exercise or activity can help reduce stress. Walking is a great way to get started. Even everyday activities such as housecleaning or yard work can help.  Try yoga or loretta chi. These techniques combine exercise and meditation. You may need some training at first to learn them.  Do something you enjoy. For example, listen to music or go to a movie. Practice your hobby or do volunteer work.  Meditate. This can help you relax, because you are not worrying about what happened before or what may happen in the future.  Do guided imagery. Imagine yourself in any setting that helps you feel calm. You can use online videos, books, or a teacher to guide you.  Do breathing exercises. For example:  From a standing position, bend forward from the waist with your knees slightly bent. Let your arms dangle close to the floor.  Breathe in slowly and deeply as you return to a standing position. Roll up slowly and lift your head last.  Hold your breath for just a few seconds in the standing position.  Breathe out slowly and bend forward from the waist.  Let your feelings out. Talk, laugh, cry, and express anger when you need to. Talking with supportive friends or family, a counselor, or a maryanne leader about your feelings is a healthy way to relieve stress. Avoid discussing your feelings with people who make you feel worse.  Write. It may help to write about things that are bothering you. This helps you find out how much stress you feel and what is causing it. When you know this, you can find better ways to cope.  What can you do to prevent stress?  You might try some of these things to help prevent stress:  Manage your time. This helps you find time to do the things you want and need to do.  Get enough sleep. Your body recovers from the stresses of the day while  "you are sleeping.  Get support. Your family, friends, and community can make a difference in how you experience stress.  Limit your news feed. Avoid or limit time on social media or news that may make you feel stressed.  Do something active. Exercise or activity can help reduce stress. Walking is a great way to get started.  Where can you learn more?  Go to https://www.[employer].net/patiented  Enter N032 in the search box to learn more about \"Learning About Stress.\"  Current as of: October 24, 2024  Content Version: 14.4    0067-6635 [employer].   Care instructions adapted under license by your healthcare professional. If you have questions about a medical condition or this instruction, always ask your healthcare professional. [employer] disclaims any warranty or liability for your use of this information.    9 Ways to Cut Back on Drinking  Maybe you've found yourself drinking more alcohol than you'd prefer. If you want to cut back, here are some ideas to try.    Think before you drink.  Do you really want a drink, or is it just a habit? If you're used to having a drink at a certain time, try doing something else then.     Look for substitutes.  Find some no-alcohol drinks that you enjoy, like flavored seltzer water, tea with honey, or tonic with a slice of lime. Or try alcohol-free beer or \"virgin\" cocktails (without the alcohol).     Drink more water.  Use water to quench your thirst. Drink a glass of water before you have any alcohol. Have another glass along with every drink or between drinks.     Shrink your drink.  For example, have a bottle of beer instead of a pint. Use a smaller glass for wine. Choose drinks with lower alcohol content (ABV%). Or use less liquor and more mixer in cocktails.     Slow down.  It's easy to drink quickly and without thinking about it. Pay attention, and make each drink last longer.     Do the math.  Total up how much you spend on alcohol each month. How " "much is that a year? If you cut back, what could you do with the money you save?     Take a break.  Choose a day or two each week when you won't drink at all. Notice how you feel on those days, physically and emotionally. How did you sleep? Do you feel better? Over time, add more break days.     Count calories.  Would you like to lose some weight? For some people that's a good motivator for cutting back. Figure out how many calories are in each drink. How many does that add up to in a day? In a week? In a month?     Practice saying no.  Be ready when someone offers you a drink. Try: \"Thanks, I've had enough.\" Or \"Thanks, but I'm cutting back.\" Or \"No, thanks. I feel better when I drink less.\"   Current as of: August 20, 2024  Content Version: 14.4    4536-4429 [employer].   Care instructions adapted under license by your healthcare professional. If you have questions about a medical condition or this instruction, always ask your healthcare professional. [employer] disclaims any warranty or liability for your use of this information.     "

## 2025-04-22 NOTE — PROGRESS NOTES
Preventive Care Visit  [facility]  Lety Barger, NIKOLAY, Family Medicine  Apr 22, 2025      Assessment & Plan     Routine general medical examination at a health care facility       Arthralgia of both knees  Patient has seen sports medicine in the past and has several steroid injections the last 1 in December which he reports was only effective for 1 to 2 weeks  Discussed treatment of osteoarthritis and activity restrictions  Follow-up with sports medicine to reevaluate next steps and/or recommended for Synvisc injection    - LYME DISEASE TOTAL ANTIBODIES WITH REFLEX TO CONFIRMATION  - Orthopedic  Referral  - OFFICE/OUTPT VISIT,EST,LEVL III    Hip pain, left  Uncertain cause of pain but could be related to OA in the hip.  X-ray completed today    - Orthopedic  Referral  - XR Hip Left 2-3 Views  - OFFICE/OUTPT VISIT,EST,LEVL III    Hypertension goal BP (blood pressure) < 140/90  Controlled    - amLODIPine (NORVASC) 10 MG tablet  Dispense: 90 tablet; Refill: 3  - lisinopril-hydrochlorothiazide (ZESTORETIC) 20-25 MG tablet  Dispense: 90 tablet; Refill: 3  - OFFICE/OUTPT VISIT,EST,LEVL III    Other fatigue  Certain etiology reports waking up at night sometimes with pain and numbness tingling in the hands this could result in nonrestorative sleep  Show labs added may need sleep study in addition if labs normal    - LYME DISEASE TOTAL ANTIBODIES WITH REFLEX TO CONFIRMATION  - CBC with platelets  - Ferritin  - Vitamin B12  - Comprehensive metabolic panel (BMP + Alb, Alk Phos, ALT, AST, Total. Bili, TP)  - OFFICE/OUTPT VISIT,EST,LEVL III    Hyperlipidemia LDL goal <100     - Lipid panel reflex to direct LDL Non-fasting  - atorvastatin (LIPITOR) 20 MG tablet  Dispense: 90 tablet; Refill: 3  - OFFICE/OUTPT VISIT,EST,LEVL III    Numbness and tingling in both hands  Likely carpal tunnel given history with work and excessive gripping and lifting  EMG ordered  Braces  "recommended    - LYME DISEASE TOTAL ANTIBODIES WITH REFLEX TO CONFIRMATION  - CBC with platelets  - Ferritin  - Vitamin B12  - Comprehensive metabolic panel (BMP + Alb, Alk Phos, ALT, AST, Total. Bili, TP)  - EMG  - OFFICE/OUTPT VISIT,EST,LEVL III    Encounter for HCV screening test for low risk patient     - Hepatitis C Screen Reflex to HCV RNA Quant and Genotype  - OFFICE/OUTPT VISIT,EST,LEVL III    History of diverticulitis     - OFFICE/OUTPT VISIT,EST,LEVL III    Screening for cardiovascular condition     - OFFICE/OUTPT VISIT,EST,LEVL III    Encounter for screening for HIV     - HIV Antigen Antibody Combo  - OFFICE/OUTPT VISIT,EST,LEVL III      Patient has been advised of split billing requirements and indicates understanding: Yes        BMI  Estimated body mass index is 32.01 kg/m  as calculated from the following:    Height as of this encounter: 1.727 m (5' 8\").    Weight as of this encounter: 95.5 kg (210 lb 8 oz).   Weight management plan: Discussed healthy diet and exercise guidelines    Counseling  Appropriate preventive services were addressed with this patient via screening, questionnaire, or discussion as appropriate for fall prevention, nutrition, physical activity, Tobacco-use cessation, social engagement, weight loss and cognition.  Checklist reviewing preventive services available has been given to the patient.  Reviewed patient's diet, addressing concerns and/or questions.   He is at risk for psychosocial distress and has been provided with information to reduce risk.   The patient reports drinking more than 3 alcoholic drinks per day and/or more than 7 drhnks per week. The patient was counseled and given information about possible harmful effects of excessive alcohol intake.    Follow-up  Return in about 4 weeks (around 5/20/2025) for Recheck symptoms, Medication Follow up, Lab Work.    William Stern is a 64 year old, presenting for the following:  Physical, Hypertension (Renew med), Lipids " (renew meds), LAB REQUEST (Wants testing for lymes , wife noticed bullseye area on back in the past ), Results (Would like to discuss recent colonoscopy results ), Numbness (Right hand numbness, constant , x 5-6 yrs ), Imm/Inj (Prevnar 20), and Fatigue (Constant fatigue x 2 yrs /)        4/22/2025     8:05 AM   Additional Questions   Roomed by Juanito CHRISTINA CMA   Accompanied by self          HPI     Chief Complaint   Patient presents with    Physical    Hypertension     Renew med    Lipids     renew meds    LAB REQUEST     Wants testing for lymes , wife noticed bullseye area on back in the past     Results     Would like to discuss recent colonoscopy results     Numbness     Right hand numbness, constant , x 5-6 yrs     Imm/Inj     Prevnar 20    Fatigue     Constant fatigue x 2 yrs        Fatigue Constant fatigue x 2 yrs    Unsure if related to pain occurring at night - waking him up.  Getting to be at 8 pm and waking up at 7 am but still having daytime fatigue. Waking up 1-2 x at night but goes right back to sleep.    Numbness Right hand numbness, constant , x 5-6 yrs , has tried a wrist brace, No relief    Reports waking up at night with right hand numbness - positioning helps some.  Numbness/tingling constant - worse over the past several months.   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        Reports bilateral knee pain right worse than left.  Reports ache, and occasional sharp pain that occurs with walking.  Worse with activity, bending, stairs.  Better with rest.  Not taking any over the counter medications for pain.  Saw Sports Med in Dec., Steroid injection given in December with little relief.  Xray done 12/2024 - showed advanced narrowing of medial compartment.    Hyperlipidemia Follow-Up    Are you regularly taking any medication or supplement to lower your cholesterol?   Yes- atorvastatin  Are you having muscle aches or other side effects that you think could be caused by your  cholesterol lowering medication?  No    Hypertension Follow-up    Do you check your blood pressure regularly outside of the clinic? No   Are you following a low salt diet? No  Are your blood pressures ever more than 140 on the top number (systolic) OR more   than 90 on the bottom number (diastolic), for example 140/90? No    BP Readings from Last 2 Encounters:   04/22/25 138/90   03/14/25 136/90       Advance Care Planning    Discussed advance care planning with patient; however, patient declined at this time.        4/17/2025   General Health   How would you rate your overall physical health? (!) FAIR   Feel stress (tense, anxious, or unable to sleep) To some extent   (!) STRESS CONCERN      4/17/2025   Nutrition   Three or more servings of calcium each day? (!) NO   Diet: Regular (no restrictions)   How many servings of fruit and vegetables per day? (!) 2-3   How many sweetened beverages each day? 0-1         4/17/2025   Exercise   Days per week of moderate/strenous exercise 4 days   Average minutes spent exercising at this level 30 min         4/17/2025   Social Factors   Frequency of gathering with friends or relatives Twice a week   Worry food won't last until get money to buy more No   Food not last or not have enough money for food? No   Do you have housing? (Housing is defined as stable permanent housing and does not include staying ouside in a car, in a tent, in an abandoned building, in an overnight shelter, or couch-surfing.) Yes   Are you worried about losing your housing? No   Lack of transportation? No   Unable to get utilities (heat,electricity)? No         4/22/2025   Fall Risk   Gait Speed Test (Document in seconds) 6          4/17/2025   Dental   Dentist two times every year? Yes         Today's PHQ-2 Score:       4/22/2025     8:02 AM   PHQ-2 ( 1999 [employer])   Q1: Little interest or pleasure in doing things 0   Q2: Feeling down, depressed or hopeless 0   PHQ-2 Score 0    Q1: Little interest or  pleasure in doing things Not at all   Q2: Feeling down, depressed or hopeless Not at all   PHQ-2 Score 0       Patient-reported           2025   Substance Use   Alcohol more than 3/day or more than 7/wk Yes   How often do you have a drink containing alcohol 2 to 3 times a week   How many alcohol drinks on typical day 3 or 4   How often do you have 5+ drinks at one occasion Never   Audit 2/3 Score 1   How often not able to stop drinking once started Never   How often failed to do what normally expected Never   How often needed first drink in am after a heavy drinking session Never   How often feeling of guilt or remorse after drinking Never   How often unable to remember what happened the night before Never   Have you or someone else been injured because of your drinking No   Has anyone been concerned or suggested you cut down on drinking No   TOTAL SCORE - AUDIT 4   Do you use any other substances recreationally? No     Social History     Tobacco Use    Smoking status: Former     Current packs/day: 0.00     Types: Cigarettes     Start date: 1985     Quit date: 1986     Years since quittin.3    Smokeless tobacco: Never   Vaping Use    Vaping status: Never Used   Substance Use Topics    Alcohol use: Yes     Comment: a few drinks now and then    Drug use: No             2025   One time HIV Screening   Previous HIV test? No         2025   STI Screening   New sexual partner(s) since last STI/HIV test? No   Last PSA:   PSA   Date Value Ref Range Status   2021 0.87 0 - 4 ug/L Final     Comment:     Assay Method:  Chemiluminescence using Siemens Vista analyzer     PSA Tumor Marker   Date Value Ref Range Status   2024 2.67 0.00 - 4.50 ng/mL Final     ASCVD Risk   The ASCVD Risk score (Trent BEDOYA, et al., 2019) failed to calculate for the following reasons:    The valid HDL cholesterol range is 20 to 100 mg/dL    Fracture Risk Assessment Tool  Link to Frax Calculator  Use the  information below to complete the Frax calculator  : 1960  Sex: male  Weight (kg): 95.5 kg (actual weight)  Height (cm): 172.7 cm  Previous Fragility Fracture:  No  History of parent with fractured hip:  No  Current Smoking:  No  Patient has been on glucocorticoids for more than 3 months (5mg/day or more): No  Rheumatoid Arthritis on Problem List:  No  Secondary Osteoporosis on Problem List:  No  Consumes 3 or more units of alcohol per day: No  Femoral Neck BMD (g/cm2)           Reviewed and updated as needed this visit by Provider                    Past Medical History:   Diagnosis Date    Hypertension      Past Surgical History:   Procedure Laterality Date    COLONOSCOPY N/A 3/14/2025    Procedure: COLONOSCOPY, FLEXIBLE, WITH LESION REMOVAL USING SNARE;  Surgeon: Basilio Park DO;  Location: [facility]    COLONOSCOPY N/A 3/14/2025    Procedure: COLONOSCOPY, WITH POLYPECTOMY AND BIOPSY;  Surgeon: Basilio Park DO;  Location: [facility]    ORTHOPEDIC SURGERY      REPAIR TENDON BICEPS DISTAL UPPER EXTREMITY  2014    Procedure: REPAIR TENDON BICEPS DISTAL UPPER EXTREMITY;  Surgeon: Ryan Joe MD;  Location: [facility]    SURGICAL HISTORY OF -       RIGHT knee arthroscopic surgery    SURGICAL HISTORY OF -   97    vasectomy     OB History   No obstetric history on file.     Lab work is in process  Labs reviewed in EPIC  BP Readings from Last 3 Encounters:   25 138/90   25 136/90   24 (!) 143/83    Wt Readings from Last 3 Encounters:   25 95.5 kg (210 lb 8 oz)   24 94.8 kg (209 lb)   24 94.8 kg (209 lb)                  Patient Active Problem List   Diagnosis    Hyperlipidemia LDL goal <100    Hypertension goal BP (blood pressure) < 140/90    History of Clostridium difficile infection    Acute left-sided low back pain with left-sided sciatica    Arthralgia of both knees    Colon adenoma    Hip pain, left    Other fatigue    Numbness and  tingling in both hands     Past Surgical History:   Procedure Laterality Date    COLONOSCOPY N/A 3/14/2025    Procedure: COLONOSCOPY, FLEXIBLE, WITH LESION REMOVAL USING SNARE;  Surgeon: Basilio Park DO;  Location: [facility]    COLONOSCOPY N/A 3/14/2025    Procedure: COLONOSCOPY, WITH POLYPECTOMY AND BIOPSY;  Surgeon: Basilio Park DO;  Location: [facility]    ORTHOPEDIC SURGERY      REPAIR TENDON BICEPS DISTAL UPPER EXTREMITY  2014    Procedure: REPAIR TENDON BICEPS DISTAL UPPER EXTREMITY;  Surgeon: Ryan Joe MD;  Location: [facility]    SURGICAL HISTORY OF -       RIGHT knee arthroscopic surgery    SURGICAL HISTORY OF -   97    vasectomy       Social History     Tobacco Use    Smoking status: Former     Current packs/day: 0.00     Types: Cigarettes     Start date: 1985     Quit date: 1986     Years since quittin.3    Smokeless tobacco: Never   Substance Use Topics    Alcohol use: Yes     Comment: a few drinks now and then     Family History   Problem Relation Age of Onset    Hypertension Mother     Hypertension Father     C.A.D. Father     Cardiovascular Father     Hypertension Maternal Grandmother     Hypertension Maternal Grandfather     Hypertension Brother          Current Outpatient Medications   Medication Sig Dispense Refill    amLODIPine (NORVASC) 10 MG tablet Take 1 tablet (10 mg) by mouth daily. 90 tablet 3    atorvastatin (LIPITOR) 20 MG tablet Take 1 tablet (20 mg) by mouth daily. 90 tablet 3    lisinopril-hydrochlorothiazide (ZESTORETIC) 20-25 MG tablet Take 1 tablet by mouth daily. 90 tablet 3    Multiple Vitamins-Minerals (ONE-A-DAY 50 PLUS PO) Take 1 tablet by mouth daily       Allergies   Allergen Reactions    Nkda [No Known Drug Allergy]      Recent Labs   Lab Test 24  0916 23  0817 22  1541 22  1203 22  1203 21  0706 20  0657 19  0631 18  1327 10/05/18  1425   A1C  --   --   --   --   --   --   "5.7*  --   --   --    * 109*  --   --  74 81 93   < >  --   --     74  --   --  103 91 75   < >  --   --    TRIG 55 75  --   --  78 75 101   < >  --   --    ALT  --   --  39  --   --   --   --   --  34 51   CR 0.90 0.80 0.75   < > 0.78 0.90 0.80   < > 0.92 0.91   GFRESTIMATED >90 >90 >90   < > >90 >90 >90   < > 85 87   GFRESTBLACK  --   --   --   --   --  >90 >90   < > >90 >90   POTASSIUM 4.1 4.2 3.6   < > 3.4 3.7 3.7   < > 3.6 3.7    < > = values in this interval not displayed.               Review of Systems  Constitutional, HEENT, cardiovascular, pulmonary, GI, , musculoskeletal, neuro, skin, endocrine and psych systems are negative, except as otherwise noted.  POS for left hip pain that radiates to left thigh upper.  Pain has been present for the past 5 years but worsening lately. Pain is constant. No trauma or injury.     Objective    Exam  /90 (BP Location: Right arm, Patient Position: Sitting, Cuff Size: Adult Large)   Pulse 88   Temp 98.6  F (37  C) (Tympanic)   Ht 1.727 m (5' 8\")   Wt 95.5 kg (210 lb 8 oz)   SpO2 96%   BMI 32.01 kg/m     Estimated body mass index is 32.01 kg/m  as calculated from the following:    Height as of this encounter: 1.727 m (5' 8\").    Weight as of this encounter: 95.5 kg (210 lb 8 oz).    Physical Exam  GENERAL: alert and no distress  EYES: Eyes grossly normal to inspection, PERRL and conjunctivae and sclerae normal  HENT: ear canals and TM's normal, nose and mouth without ulcers or lesions  NECK: no adenopathy, no asymmetry, masses, or scars  RESP: lungs clear to auscultation - no rales, rhonchi or wheezes  CV: regular rate and rhythm, normal S1 S2, no S3 or S4, no murmur, click or rub, no peripheral edema  ABDOMEN: soft, nontender, no hepatosplenomegaly, no masses and bowel sounds normal  MS: extremities normal- no gross deformities noted  ORTHO: Wrist Exam: WRIST:  Inspection: no swelling  no effusion  Non-tender: extension tendons, flexor " tendons  Range of Motion: normal  Strength: no deficits  Special tests: Right hand positive Tinel's at carpal tunnel.  , positive Phalen's.      ELBOW:  elbow exam not done    Hip Exam: limited ROM left hip with adduction, tenderness anterior hip on palpation, positive FABERS left.  SKIN: no suspicious lesions or rashes  NEURO: Normal strength and tone, mentation intact and speech normal  PSYCH: mentation appears normal, affect normal/bright        Signed Electronically by: Lety Barger, DNP

## 2025-04-23 ENCOUNTER — PATIENT OUTREACH (OUTPATIENT)
Dept: CARE COORDINATION | Facility: CLINIC | Age: 65
End: 2025-04-23
Payer: COMMERCIAL

## 2025-04-23 LAB — B BURGDOR IGG+IGM SER QL: 0.06

## 2025-04-24 NOTE — PROGRESS NOTES
"Chief Complaint:   Chief Complaint   Patient presents with    Left Hip - Pain     Jarred is here today for left hip pain.  He has been having pain for many years, but over the past 6 months has gotten worse.  Had an achilles tendon repair in '07 and was told left leg would be shorter after that surgery.  Has used advil for minimal relief.  No injections in the left hip. Pain over posterior hip into lateral hip.  Walking and stairs worsen symptoms.         Steven Buck is seen today in the [facility] for evaluation of left hip pain at the request of Lety Barger       HISTORY OF PRESENT ILLNESS    Steven Buck is a 64 year old male seen for evaluation of ongoing left hip pain with no known injury.   Pain has been present for many years, but worse over the past 6 months. He locates pain in the back of the hip and side of the hip, aggravated with walking, stairs, donning shoes/socks. Treatment has been advil with minimal relief.    Has pain at rest. Will bother at night sleeping on left side.    He had an achilles repair in 2007 and was told the left leg was shorter after the surgery?      Perceived limb length inequality: No,      Has bilateral knee pain as well. Getting cortisone with lessened relief, sounds like they might try \"gel\" shots next.  Has upper extremity numbness and tingling.  Does have low back pain. Some numbness and tingling down the left lower extremity in the past. History of sciatica.      Diabetic: No;    Smoker: No;     Anti-coagulation: No;      Present symptoms: pain diffuse, pain dull/achy , moderate pain.    Pain severity: 8/10  Pain quality: aching  Frequency of symptoms: are constant  Exacerbating Factors: weight bearing, stairs, [id number], in and out of car  Relieving Factors: rest, sitting  Night Pain: Yes  Pain while at rest: Yes   Associated numbness or tingling: No     Patient has tried:     NSAIDS: Yes      Acetaminophen: No    "  Opioids: No     Physical Therapy: No      Home exercise program: No      Activity modification: Yes       Assistive device:  No     Injections: No       Ice: Yes      Heat: No      Topicals: No     Other PMH:  has a past medical history of Hypertension.  Patient Active Problem List   Diagnosis    Hyperlipidemia LDL goal <100    Hypertension goal BP (blood pressure) < 140/90    History of Clostridium difficile infection    Acute left-sided low back pain with left-sided sciatica    Arthralgia of both knees    Colon adenoma    Hip pain, left    Other fatigue    Numbness and tingling in both hands       Surgical Hx:  has a past surgical history that includes surgical history of -  (1995); surgical history of -  (1/16/97); orthopedic surgery; Repair tendon biceps distal upper extremity (6/27/2014); Colonoscopy (N/A, 3/14/2025); and Colonoscopy (N/A, 3/14/2025).    Medications:   Current Outpatient Medications:     amLODIPine (NORVASC) 10 MG tablet, Take 1 tablet (10 mg) by mouth daily., Disp: 90 tablet, Rfl: 3    atorvastatin (LIPITOR) 20 MG tablet, Take 1 tablet (20 mg) by mouth daily., Disp: 90 tablet, Rfl: 3    lisinopril-hydrochlorothiazide (ZESTORETIC) 20-25 MG tablet, Take 1 tablet by mouth daily., Disp: 90 tablet, Rfl: 3    Multiple Vitamins-Minerals (ONE-A-DAY 50 PLUS PO), Take 1 tablet by mouth daily, Disp: , Rfl:     Allergies:   Allergies   Allergen Reactions    Nkda [No Known Drug Allergy]        Social Hx: retired ..  reports that he quit smoking about 39 years ago. His smoking use included cigarettes. He started smoking about 40 years ago. He has never used smokeless tobacco. He reports current alcohol use. He reports that he does not use drugs.    Family Hx: family history includes C.A.D. in his father; Cardiovascular in his father; Hypertension in his brother, father, maternal grandfather, maternal grandmother, and mother.    REVIEW OF SYSTEMS:  10 point ROS neg other than the symptoms noted above  "in the HPI and PAST MEDICAL HISTORY. Notables,  CONSTITUTIONAL:NEGATIVE for fever, chills, change in weight  INTEGUMENTARY/SKIN: NEGATIVE for worrisome rashes, moles or lesions  MUSCULOSKELETAL:See HPI above  NEURO: NEGATIVE for weakness, dizziness or paresthesias    PHYSICAL EXAM:  Ht 1.727 m (5' 8\")   Wt 96.6 kg (213 lb)   BMI 32.39 kg/m     GENERAL APPEARANCE: healthy, alert, no distress  SKIN: no suspicious lesions or rashes  NEURO: Normal strength and tone, mentation intact and speech normal  PSYCH:  mentation appears normal and affect normal  RESPIRATORY: No increased work of breathing.  LYMPH: no palpable inguinal lymph nodes.    BILATERAL LOWER EXTREMITIES:  Gait: antalgic favoring right    varus alignment.  No gross deformities or masses.  No Quad atrophy, strength normal.  Intact sensation deep peroneal nerve, superficial peroneal nerve, med/lat tibial nerve, sural nerve, saphenous nerve  Intact EHL, EDL, TA, FHL, GS, quadriceps hamstrings and hip flexors   Bilateral calf soft and nttp or squeeze.  DTRs: achilles 2+, patella 2+.  Edema: trace       BACK EXAM  Lumbar range of motion: flexion to touch toes, extension adequate and causes low back pain , side bend: to the left cause left low back pain.  Squat: positive  Seated SLR: negative , bilateral.  Supine SLR: negative , bilateral.   Sciatic Notch tenderness: positive without radiation    LEFT HIP EXAM:      Palpation: Tender:   SI joint, buttock, greater trochanter.   Nontender: groin  Strength:  4/5  Special tests:  Irritability (flexion/adduction/internal rotation) positive.  Hip range of motion: Internal rotation: 0, External rotation: 30, Flexion 90 degrees with obligatory external rotation.      RIGHT HIP EXAM:    Palpation: Tender:   SI joint, buttock   Nontender: groin  Strength:  4+/5  Special tests:  Irritability (flexion/adduction/internal rotation) mild positive.  Hip range of motion: Internal rotation: 5, External rotation: 35, Flexion 90 " "degrees        X-RAY: AP pelvis 4/30/2025 and AP/Lateral views left hip from 4/22/2025 were reviewed in clinic today. No obvious fractures or dislocations. Severe left hip arthrosis with bone on bone loss of superior joint space, subchondral cystic changes and sclerosis, marginal osteophytes. Moderate - severe right hip arthrosis. Atherosclerotic vascular calcifications. Low lumbar degenerative changes.         Impression: 65yo male with chronic left hip pain, advanced left hip primary osteoarthritis. Also has advanced right hip primary osteoarthritis, less symptomatic.    Plan:   discussed pain in groin/hip likely from advanced hip arthritis. This is wearing of the cartilage within the hip joint either due to normal \"wear and tear\" or following an injury.   Any low back / buttock / radiating pain likely coming from the low back.    *  treatment options for hip arthritis and pain include: do nothing, NSAIDS, activity modification, Physical Therapy, injections, total hip arthroplasty. Risks and benefits of each discussed.   * did discuss patient is a candidate for total hip arthroplasty, and offered total hip arthroplasty to patient. Total hip arthroplasty will only attempt to provide pain relief from hip related arthritis only and not any pain from the low back. Any low back pain likely to continue.  *  Discussed risks of surgery include, but not limited to: bleeding, infection, pain, scar, damage to adjacent structures (e.g. Nerves, blood vessels, bone, cartilage), temporary or permanent nerve damage, recurrence of symptoms, perioperative fracture,  implant dislocation, implant failure, implant infection, unequal limb lengths,  periprosthetic fracture, stiffness, need for further surgery, blood clots, pulmonary embolism, risks of anesthesia, and death.  At this time, nonsurgical management is not providing them adequate relief of pain. Given the severity of the osteoarthritis, I don't think that any further " "nonsurgical management, either injections, therapy , etc. will alter the course of the osteoarthritis in a beneficial manner the patient is seeking at this time.    * understanding the risks of surgery, as a quality of life decision, patient would like to proceed with injections. He's thinking of surgery later this Fall, around November.    Will place referral for an image-guided left hip cortisone injection with sports medicine.  Should he want surgery in the future, must be at least 3 months following an injection. Also, if he's think surgery November, advised patient to reach out around August/September as we are booking out a ways for surgeries.    In anticipation of future surgery at some point, we did discuss:     we would plan hospitalization overnight with discharge home the next morning. They would need someone, a \"\"-like person, to be at home with them to help them out with daily tasks, safety and home exercise program.  If they cannot have someone at home, then we cannot schedule surgery.    * will plan postoperative deep vein thrombosis prophylaxis x4 weeks.  Additionally, SCDs while in the hospital.  * postoperative pain control will be oral medications upon discharge from hospital for the first couple of weeks only.  * postoperative Physical Therapy will be discussed, if needed, at first postoperative visit at 2 weeks  * patient will need longterm (at least 1 years depending on risk factors) prophylactic antibiotics use with dental procedures or other invasive procedures (2 g amoxicilin or  2g Keflex or 500mg azithromycin or clarithromycin or 100mg doxycycline) 30-60 minutes prior to dental procedures.  * patient will need preoperative H+P prior to surgery from PCP.  * will see patient 2 weeks postoperative, sooner as needed. Will obtain lowset AP pelvis and lateral right hip xray at that time.      * All questions were addressed and answered prior to discharge from clinic today. The patient " acknowledges an understanding of and agreement with the plan set forth during today's visit. Patient was advised to call our office or MyChart us if any further questions arise upon leaving our office today.           Johnathan Baker M.D., M.S.  Dept. of Orthopaedic Surgery  [facility]

## 2025-04-30 ENCOUNTER — ANCILLARY PROCEDURE (OUTPATIENT)
Dept: GENERAL RADIOLOGY | Facility: CLINIC | Age: 65
End: 2025-04-30
Attending: ORTHOPAEDIC SURGERY
Payer: COMMERCIAL

## 2025-04-30 ENCOUNTER — OFFICE VISIT (OUTPATIENT)
Dept: ORTHOPEDICS | Facility: CLINIC | Age: 65
End: 2025-04-30
Attending: NURSE PRACTITIONER
Payer: COMMERCIAL

## 2025-04-30 VITALS — HEIGHT: 68 IN | BODY MASS INDEX: 32.28 KG/M2 | WEIGHT: 213 LBS

## 2025-04-30 DIAGNOSIS — M16.12 ARTHRITIS OF LEFT HIP: ICD-10-CM

## 2025-04-30 DIAGNOSIS — M25.552 HIP PAIN, LEFT: ICD-10-CM

## 2025-04-30 DIAGNOSIS — M16.12 PRIMARY OSTEOARTHRITIS OF LEFT HIP: Primary | ICD-10-CM

## 2025-04-30 PROCEDURE — 1125F AMNT PAIN NOTED PAIN PRSNT: CPT | Performed by: ORTHOPAEDIC SURGERY

## 2025-04-30 PROCEDURE — 99244 OFF/OP CNSLTJ NEW/EST MOD 40: CPT | Performed by: ORTHOPAEDIC SURGERY

## 2025-04-30 PROCEDURE — 72170 X-RAY EXAM OF PELVIS: CPT | Mod: TC | Performed by: RADIOLOGY

## 2025-04-30 ASSESSMENT — PAIN SCALES - GENERAL: PAINLEVEL_OUTOF10: SEVERE PAIN (8)

## 2025-04-30 NOTE — LETTER
"4/30/2025      Steven Buck  [address] [place] [address]      Dear Colleague,    Thank you for referring your patient, Steven Buck, to the [facility] [place]. Please see a copy of my visit note below.    Chief Complaint:   Chief Complaint   Patient presents with     Left Hip - Pain     Jarred is here today for left hip pain.  He has been having pain for many years, but over the past 6 months has gotten worse.  Had an achilles tendon repair in '07 and was told left leg would be shorter after that surgery.  Has used advil for minimal relief.  No injections in the left hip. Pain over posterior hip into lateral hip.  Walking and stairs worsen symptoms.         Steven Buck is seen today in the [facility] for evaluation of left hip pain at the request of Lety Barger       HISTORY OF PRESENT ILLNESS    Steven Buck is a 64 year old male seen for evaluation of ongoing left hip pain with no known injury.   Pain has been present for many years, but worse over the past 6 months. He locates pain in the back of the hip and side of the hip, aggravated with walking, stairs, donning shoes/socks. Treatment has been advil with minimal relief.    Has pain at rest. Will bother at night sleeping on left side.    He had an achilles repair in 2007 and was told the left leg was shorter after the surgery?      Perceived limb length inequality: No,      Has bilateral knee pain as well. Getting cortisone with lessened relief, sounds like they might try \"gel\" shots next.  Has upper extremity numbness and tingling.  Does have low back pain. Some numbness and tingling down the left lower extremity in the past. History of sciatica.      Diabetic: No;    Smoker: No;     Anti-coagulation: No;      Present symptoms: pain diffuse, pain dull/achy , moderate pain.    Pain severity: 8/10  Pain quality: aching  Frequency of symptoms: are " constant  Exacerbating Factors: weight bearing, stairs, [id number], in and out of car  Relieving Factors: rest, sitting  Night Pain: Yes  Pain while at rest: Yes   Associated numbness or tingling: No     Patient has tried:     NSAIDS: Yes      Acetaminophen: No     Opioids: No     Physical Therapy: No      Home exercise program: No      Activity modification: Yes       Assistive device:  No     Injections: No       Ice: Yes      Heat: No      Topicals: No     Other PMH:  has a past medical history of Hypertension.  Patient Active Problem List   Diagnosis     Hyperlipidemia LDL goal <100     Hypertension goal BP (blood pressure) < 140/90     History of Clostridium difficile infection     Acute left-sided low back pain with left-sided sciatica     Arthralgia of both knees     Colon adenoma     Hip pain, left     Other fatigue     Numbness and tingling in both hands       Surgical Hx:  has a past surgical history that includes surgical history of -  (1995); surgical history of -  (1/16/97); orthopedic surgery; Repair tendon biceps distal upper extremity (6/27/2014); Colonoscopy (N/A, 3/14/2025); and Colonoscopy (N/A, 3/14/2025).    Medications:   Current Outpatient Medications:      amLODIPine (NORVASC) 10 MG tablet, Take 1 tablet (10 mg) by mouth daily., Disp: 90 tablet, Rfl: 3     atorvastatin (LIPITOR) 20 MG tablet, Take 1 tablet (20 mg) by mouth daily., Disp: 90 tablet, Rfl: 3     lisinopril-hydrochlorothiazide (ZESTORETIC) 20-25 MG tablet, Take 1 tablet by mouth daily., Disp: 90 tablet, Rfl: 3     Multiple Vitamins-Minerals (ONE-A-DAY 50 PLUS PO), Take 1 tablet by mouth daily, Disp: , Rfl:     Allergies:   Allergies   Allergen Reactions     Nkda [No Known Drug Allergy]        Social Hx: retired ..  reports that he quit smoking about 39 years ago. His smoking use included cigarettes. He started smoking about 40 years ago. He has never used smokeless tobacco. He reports current alcohol use. He reports that  "he does not use drugs.    Family Hx: family history includes C.A.D. in his father; Cardiovascular in his father; Hypertension in his brother, father, maternal grandfather, maternal grandmother, and mother.    REVIEW OF SYSTEMS:  10 point ROS neg other than the symptoms noted above in the HPI and PAST MEDICAL HISTORY. Notables,  CONSTITUTIONAL:NEGATIVE for fever, chills, change in weight  INTEGUMENTARY/SKIN: NEGATIVE for worrisome rashes, moles or lesions  MUSCULOSKELETAL:See HPI above  NEURO: NEGATIVE for weakness, dizziness or paresthesias    PHYSICAL EXAM:  Ht 1.727 m (5' 8\")   Wt 96.6 kg (213 lb)   BMI 32.39 kg/m     GENERAL APPEARANCE: healthy, alert, no distress  SKIN: no suspicious lesions or rashes  NEURO: Normal strength and tone, mentation intact and speech normal  PSYCH:  mentation appears normal and affect normal  RESPIRATORY: No increased work of breathing.  LYMPH: no palpable inguinal lymph nodes.    BILATERAL LOWER EXTREMITIES:  Gait: antalgic favoring right    varus alignment.  No gross deformities or masses.  No Quad atrophy, strength normal.  Intact sensation deep peroneal nerve, superficial peroneal nerve, med/lat tibial nerve, sural nerve, saphenous nerve  Intact EHL, EDL, TA, FHL, GS, quadriceps hamstrings and hip flexors   Bilateral calf soft and nttp or squeeze.  DTRs: achilles 2+, patella 2+.  Edema: trace       BACK EXAM  Lumbar range of motion: flexion to touch toes, extension adequate and causes low back pain , side bend: to the left cause left low back pain.  Squat: positive  Seated SLR: negative , bilateral.  Supine SLR: negative , bilateral.   Sciatic Notch tenderness: positive without radiation    LEFT HIP EXAM:      Palpation: Tender:   SI joint, buttock, greater trochanter.   Nontender: groin  Strength:  4/5  Special tests:  Irritability (flexion/adduction/internal rotation) positive.  Hip range of motion: Internal rotation: 0, External rotation: 30, Flexion 90 degrees with " "obligatory external rotation.      RIGHT HIP EXAM:    Palpation: Tender:   SI joint, buttock   Nontender: groin  Strength:  4+/5  Special tests:  Irritability (flexion/adduction/internal rotation) mild positive.  Hip range of motion: Internal rotation: 5, External rotation: 35, Flexion 90 degrees        X-RAY: AP pelvis 4/30/2025 and AP/Lateral views left hip from 4/22/2025 were reviewed in clinic today. No obvious fractures or dislocations. Severe left hip arthrosis with bone on bone loss of superior joint space, subchondral cystic changes and sclerosis, marginal osteophytes. Moderate - severe right hip arthrosis. Atherosclerotic vascular calcifications. Low lumbar degenerative changes.         Impression: 65yo male with chronic left hip pain, advanced left hip primary osteoarthritis. Also has advanced right hip primary osteoarthritis, less symptomatic.    Plan:   discussed pain in groin/hip likely from advanced hip arthritis. This is wearing of the cartilage within the hip joint either due to normal \"wear and tear\" or following an injury.   Any low back / buttock / radiating pain likely coming from the low back.    *  treatment options for hip arthritis and pain include: do nothing, NSAIDS, activity modification, Physical Therapy, injections, total hip arthroplasty. Risks and benefits of each discussed.   * did discuss patient is a candidate for total hip arthroplasty, and offered total hip arthroplasty to patient. Total hip arthroplasty will only attempt to provide pain relief from hip related arthritis only and not any pain from the low back. Any low back pain likely to continue.  *  Discussed risks of surgery include, but not limited to: bleeding, infection, pain, scar, damage to adjacent structures (e.g. Nerves, blood vessels, bone, cartilage), temporary or permanent nerve damage, recurrence of symptoms, perioperative fracture,  implant dislocation, implant failure, implant infection, unequal limb lengths,  " "periprosthetic fracture, stiffness, need for further surgery, blood clots, pulmonary embolism, risks of anesthesia, and death.  At this time, nonsurgical management is not providing them adequate relief of pain. Given the severity of the osteoarthritis, I don't think that any further nonsurgical management, either injections, therapy , etc. will alter the course of the osteoarthritis in a beneficial manner the patient is seeking at this time.    * understanding the risks of surgery, as a quality of life decision, patient would like to proceed with injections. He's thinking of surgery later this Fall, around November.    Will place referral for an image-guided left hip cortisone injection with sports medicine.  Should he want surgery in the future, must be at least 3 months following an injection. Also, if he's think surgery November, advised patient to reach out around August/September as we are booking out a ways for surgeries.    In anticipation of future surgery at some point, we did discuss:     we would plan hospitalization overnight with discharge home the next morning. They would need someone, a \"\"-like person, to be at home with them to help them out with daily tasks, safety and home exercise program.  If they cannot have someone at home, then we cannot schedule surgery.    * will plan postoperative deep vein thrombosis prophylaxis x4 weeks.  Additionally, SCDs while in the hospital.  * postoperative pain control will be oral medications upon discharge from hospital for the first couple of weeks only.  * postoperative Physical Therapy will be discussed, if needed, at first postoperative visit at 2 weeks  * patient will need longterm (at least 1 years depending on risk factors) prophylactic antibiotics use with dental procedures or other invasive procedures (2 g amoxicilin or  2g Keflex or 500mg azithromycin or clarithromycin or 100mg doxycycline) 30-60 minutes prior to dental procedures.  * patient will " need preoperative H+P prior to surgery from PCP.  * will see patient 2 weeks postoperative, sooner as needed. Will obtain lowset AP pelvis and lateral right hip xray at that time.      * All questions were addressed and answered prior to discharge from clinic today. The patient acknowledges an understanding of and agreement with the plan set forth during today's visit. Patient was advised to call our office or MyChart us if any further questions arise upon leaving our office today.           Johnathan Baker M.D., M.S.  Dept. of Orthopaedic Surgery  [facility]             Again, thank you for allowing me to participate in the care of your patient.        Sincerely,        Johnathan Baker MD    Electronically signed

## 2025-05-01 ENCOUNTER — PATIENT OUTREACH (OUTPATIENT)
Dept: CARE COORDINATION | Facility: CLINIC | Age: 65
End: 2025-05-01
Payer: COMMERCIAL

## 2025-05-13 ENCOUNTER — OFFICE VISIT (OUTPATIENT)
Dept: ORTHOPEDICS | Facility: CLINIC | Age: 65
End: 2025-05-13
Attending: NURSE PRACTITIONER
Payer: COMMERCIAL

## 2025-05-13 DIAGNOSIS — M16.12 PRIMARY OSTEOARTHRITIS OF LEFT HIP: ICD-10-CM

## 2025-05-13 DIAGNOSIS — M17.0 BILATERAL PRIMARY OSTEOARTHRITIS OF KNEE: Primary | ICD-10-CM

## 2025-05-13 RX ORDER — ROPIVACAINE HYDROCHLORIDE 5 MG/ML
4 INJECTION, SOLUTION EPIDURAL; INFILTRATION; PERINEURAL
Status: COMPLETED | OUTPATIENT
Start: 2025-05-13 | End: 2025-05-13

## 2025-05-13 RX ORDER — BETAMETHASONE SODIUM PHOSPHATE AND BETAMETHASONE ACETATE 3; 3 MG/ML; MG/ML
6 INJECTION, SUSPENSION INTRA-ARTICULAR; INTRALESIONAL; INTRAMUSCULAR; SOFT TISSUE
Status: COMPLETED | OUTPATIENT
Start: 2025-05-13 | End: 2025-05-13

## 2025-05-13 RX ADMIN — BETAMETHASONE SODIUM PHOSPHATE AND BETAMETHASONE ACETATE 6 MG: 3; 3 INJECTION, SUSPENSION INTRA-ARTICULAR; INTRALESIONAL; INTRAMUSCULAR; SOFT TISSUE at 10:09

## 2025-05-13 RX ADMIN — ROPIVACAINE HYDROCHLORIDE 4 ML: 5 INJECTION, SOLUTION EPIDURAL; INFILTRATION; PERINEURAL at 10:09

## 2025-05-13 NOTE — LETTER
5/13/2025      Steven Buck  [address] [place] [address]      Dear Colleague,    Thank you for referring your patient, Steven Buck, to the [facility]. Please see a copy of my visit note below.    ASSESSMENT & PLAN    Jarred was seen today for pain, procedure, pain and procedure.    Diagnoses and all orders for this visit:    Bilateral primary osteoarthritis of knee  -     Orthopedic  Referral  -     Large Joint Injection/Arthocentesis: bilateral knee    Primary osteoarthritis of left hip  -     Orthopedic  Referral  -     Large Joint Injection/Arthocentesis: L hip joint        Steven Buck was seen today in request for a bilateral knee HA (gel) injections and left hip cortisone injection for previously diagnosed OA of the bilateral knees (R>L) and L hip.    Patient was independently assessed by me and was deemed appropriate for this procedure. Risks and benefits were discussed with good understanding including, but not limited to, the risks of bleeding, reaction to the medication, infection, potential delay of any surgical intervention by 3 months, temporary elevation of blood sugars, worsening or rapid acceleration of arthritis, and the possibility of the treatment being ineffective. The patient tolerated the procedure well with no complications.    After visit care instructions were discussed in person and provided in AVS.    Follow-up:   - Could repeat the left hip cortisone injection after 3 months if injection was helpful but pain returns and you want to avoid surgery  - If the knee injections help, we could repeat these gel injections after 6 months  - If no/ minimal relief after 4 weeks, your options would likely be a course of anti-inflammatory tablets and referral to PT for rehab +/- bracing trial     Kumar Cervantes MD  [facility]    SUBJECTIVE- May 13, 2025    Chief Complaint    Patient presents with     Right Knee - Pain, Procedure     Left Hip - Pain, Procedure       Steven Buck is a 64 year old male who is seen for bilateral knee SynviscOne injections and a left hip steroid injection.    Referred by: Kumar Cervantes MD; Johnathan Baker MD   Previous Diagnosis: Bilateral primary osteoarthritis of knee; Primary osteoarthritis of left hip    Previous injections: right knee steroid injection (most recent date: 12/13/24)  Other treatments tried: ice, copperfit compression sleeve       REVIEW OF SYSTEMS:  Negative other than the symptoms noted above in the HPI.      OBJECTIVE:  There were no vitals taken for this visit.   General: healthy, alert and in no distress  Skin: no suspicious lesions or rash noted near area of injection   CV: distal perfusion intact near area of injection  Neuro: Normal light sensory exam near area of injection    RADIOLOGY:  I independently reviewed and agree with the final results and radiologist's interpretation from the imaging relevant to today's visit, available in the [facility] health record.         Hemoglobin A1C   Date Value Ref Range Status   02/06/2020 5.7 (H) 0 - 5.6 % Final     Comment:     Normal <5.7% Prediabetes 5.7-6.4%  Diabetes 6.5% or higher - adopted from ADA   consensus guidelines.       Patient on blood thinners: No    Large Joint Injection/Arthocentesis: bilateral knee    Date/Time: 5/13/2025 10:08 AM    Performed by: Kumar Cervantes MD  Authorized by: Kumar Cervantes MD    Indications:  Pain and osteoarthritis  Needle Size:  21 G  Guidance: ultrasound    Approach:  Superolateral  Location:  Knee  Laterality:  Bilateral      Medications (Right):  48 mg hylan 48 MG/6ML  Aspirate amount (mL):  32  Aspirate:  Clear and yellow  Medications (Left):  48 mg hylan 48 MG/6ML  Outcome:  Tolerated well, no immediate complications  Procedure discussed: discussed risks, benefits, and alternatives    Consent Given by:  Patient  Timeout:  timeout called immediately prior to procedure    Prep: patient was prepped and draped in usual sterile fashion     Ultrasound images of procedure were permanently stored.     Large Joint Injection/Arthocentesis: L hip joint    Date/Time: 5/13/2025 10:09 AM    Performed by: Kumar Cervantes MD  Authorized by: Kumar Cervantes MD    Indications:  Pain and osteoarthritis  Needle Size:  22 G  Guidance: ultrasound    Approach:  Anterior  Location:  Hip      Site:  L hip joint  Medications:  6 mg betamethasone acet & sod phos 6 (3-3) MG/ML; 4 mL ROPivacaine 5 MG/ML  Outcome:  Tolerated well, no immediate complications  Procedure discussed: discussed risks, benefits, and alternatives    Consent Given by:  Patient  Timeout: timeout called immediately prior to procedure    Prep: patient was prepped and draped in usual sterile fashion     Ultrasound images of procedure were permanently stored.       Patient tolerated bilateral knee HA injections and aspiration and L hip cortisone injections. Ultrasound guidance was required to ensure correct needle placement for injection, ensure maximal aspiration, and avoid vital surrounding structures. Ultrasound guided images were permanently stored. Aftercare instructions given to patient. Plan to follow-up as above.     Kumar Cervantes MD      Again, thank you for allowing me to participate in the care of your patient.        Sincerely,        Kmuar Cervantes MD    Electronically signed

## 2025-05-13 NOTE — PATIENT INSTRUCTIONS
[facility] Injection Discharge Instructions    Procedure: bilateral knee HA (gel) injections and left hip cortisone injection    You may shower, however avoid swimming, tub baths or hot tubs for 24 hours following your procedure  You may have a mild to moderate increase in pain for several days following the injection.  It may take up to 14 days for the steroid medication to start working although you may feel the effect as early as a few days after the procedure.  You may use ice packs for 10-15 minutes, 3 to 4 times a day at the injection site for comfort  You may use anti-inflammatory medications (such as Ibuprofen or Aleve or Advil) or Tylenol for pain control if necessary and allowed to by your regular doctor  If you were fasting, you may resume your normal diet and medications after the procedure  If you have diabetes, check your blood sugar more frequently than usual as your blood sugar may be higher than normal for 10-14 days following a steroid injection. Contact your doctor who manages your diabetes if your blood sugar is higher than usual    If you experience any of the following, call [facility] @ [phone] or [phone]  - Fever over 100 degree F  - Swelling, bleeding, redness, drainage, warmth at the injection site  - New or worsening pain    Follow-up:   - Could repeat the Left hip cortisone injection after 3 months if injection was helpful but pain returns and you want to avoid surgery  - If the knee injections help, we could repeat these gel injections after 6 months  - If no/ minimal relief after 4 weeks, your options would likely be a course of anti-inflammatory tablets and referral to PT for rehab +/- bracing trial

## 2025-05-13 NOTE — PROGRESS NOTES
ASSESSMENT & PLAN    Jarred was seen today for pain, procedure, pain and procedure.    Diagnoses and all orders for this visit:    Bilateral primary osteoarthritis of knee  -     Orthopedic  Referral  -     Large Joint Injection/Arthocentesis: bilateral knee    Primary osteoarthritis of left hip  -     Orthopedic  Referral  -     Large Joint Injection/Arthocentesis: L hip joint        Steven Buck was seen today in request for a bilateral knee HA (gel) injections and left hip cortisone injection for previously diagnosed OA of the bilateral knees (R>L) and L hip.    Patient was independently assessed by me and was deemed appropriate for this procedure. Risks and benefits were discussed with good understanding including, but not limited to, the risks of bleeding, reaction to the medication, infection, potential delay of any surgical intervention by 3 months, temporary elevation of blood sugars, worsening or rapid acceleration of arthritis, and the possibility of the treatment being ineffective. The patient tolerated the procedure well with no complications.    After visit care instructions were discussed in person and provided in AVS.    Follow-up:   - Could repeat the left hip cortisone injection after 3 months if injection was helpful but pain returns and you want to avoid surgery  - If the knee injections help, we could repeat these gel injections after 6 months  - If no/ minimal relief after 4 weeks, your options would likely be a course of anti-inflammatory tablets and referral to PT for rehab +/- bracing trial     Kumar Cervantes MD  [facility] [place]    SUBJECTIVE- May 13, 2025    Chief Complaint   Patient presents with    Right Knee - Pain, Procedure    Left Hip - Pain, Procedure       Steven Buck is a 64 year old male who is seen for bilateral knee SynviscOne injections and a left hip steroid injection.    Referred by: Kumar Cervantes MD;  Johnathan Baker MD   Previous Diagnosis: Bilateral primary osteoarthritis of knee; Primary osteoarthritis of left hip    Previous injections: right knee steroid injection (most recent date: 12/13/24)  Other treatments tried: ice, copperfit compression sleeve       REVIEW OF SYSTEMS:  Negative other than the symptoms noted above in the HPI.      OBJECTIVE:  There were no vitals taken for this visit.   General: healthy, alert and in no distress  Skin: no suspicious lesions or rash noted near area of injection   CV: distal perfusion intact near area of injection  Neuro: Normal light sensory exam near area of injection    RADIOLOGY:  I independently reviewed and agree with the final results and radiologist's interpretation from the imaging relevant to today's visit, available in the [facility] health record.         Hemoglobin A1C   Date Value Ref Range Status   02/06/2020 5.7 (H) 0 - 5.6 % Final     Comment:     Normal <5.7% Prediabetes 5.7-6.4%  Diabetes 6.5% or higher - adopted from ADA   consensus guidelines.       Patient on blood thinners: No    Large Joint Injection/Arthocentesis: bilateral knee    Date/Time: 5/13/2025 10:08 AM    Performed by: Kumra Cervantes MD  Authorized by: Kumar Cervantes MD    Indications:  Pain and osteoarthritis  Needle Size:  21 G  Guidance: ultrasound    Approach:  Superolateral  Location:  Knee  Laterality:  Bilateral      Medications (Right):  48 mg hylan 48 MG/6ML  Aspirate amount (mL):  32  Aspirate:  Clear and yellow  Medications (Left):  48 mg hylan 48 MG/6ML  Outcome:  Tolerated well, no immediate complications  Procedure discussed: discussed risks, benefits, and alternatives    Consent Given by:  Patient  Timeout: timeout called immediately prior to procedure    Prep: patient was prepped and draped in usual sterile fashion     Ultrasound images of procedure were permanently stored.     Large Joint Injection/Arthocentesis: L hip joint    Date/Time: 5/13/2025 10:09  AM    Performed by: Kumar Cervantes MD  Authorized by: Kumar Cervantes MD    Indications:  Pain and osteoarthritis  Needle Size:  22 G  Guidance: ultrasound    Approach:  Anterior  Location:  Hip      Site:  L hip joint  Medications:  6 mg betamethasone acet & sod phos 6 (3-3) MG/ML; 4 mL ROPivacaine 5 MG/ML  Outcome:  Tolerated well, no immediate complications  Procedure discussed: discussed risks, benefits, and alternatives    Consent Given by:  Patient  Timeout: timeout called immediately prior to procedure    Prep: patient was prepped and draped in usual sterile fashion     Ultrasound images of procedure were permanently stored.       Patient tolerated bilateral knee HA injections and aspiration and L hip cortisone injections. Ultrasound guidance was required to ensure correct needle placement for injection, ensure maximal aspiration, and avoid vital surrounding structures. Ultrasound guided images were permanently stored. Aftercare instructions given to patient. Plan to follow-up as above.     Kumar Cervantes MD

## 2025-05-14 ENCOUNTER — TELEPHONE (OUTPATIENT)
Dept: SURGERY | Facility: CLINIC | Age: 65
End: 2025-05-14
Payer: COMMERCIAL

## 2025-05-14 ENCOUNTER — MYC MEDICAL ADVICE (OUTPATIENT)
Dept: FAMILY MEDICINE | Facility: CLINIC | Age: 65
End: 2025-05-14
Payer: COMMERCIAL

## 2025-05-14 ENCOUNTER — PATIENT OUTREACH (OUTPATIENT)
Dept: CARE COORDINATION | Facility: CLINIC | Age: 65
End: 2025-05-14
Payer: COMMERCIAL

## 2025-05-14 DIAGNOSIS — M16.12 PRIMARY OSTEOARTHRITIS OF LEFT HIP: Primary | ICD-10-CM

## 2025-05-14 RX ORDER — TRANEXAMIC ACID 650 MG/1
1950 TABLET ORAL ONCE
OUTPATIENT
Start: 2025-05-14 | End: 2025-05-14

## 2025-05-15 NOTE — TELEPHONE ENCOUNTER
Type of surgery: ARTHROPLASTY, left HIP, TOTAL, DIRECT ANTERIOR APPROACH   Location of surgery: [place] OR  Date and time of surgery: 11/18/2025  Surgeon: Samuel   Pre-Op Appt Date: patient will schedule   Post-Op Appt Date: 12/03/2025   Packet sent out: Yes  Pre-cert/Authorization completed:  No  Date:

## 2025-06-23 ENCOUNTER — RESULTS FOLLOW-UP (OUTPATIENT)
Dept: FAMILY MEDICINE | Facility: CLINIC | Age: 65
End: 2025-06-23

## 2025-06-23 ENCOUNTER — MYC MEDICAL ADVICE (OUTPATIENT)
Dept: FAMILY MEDICINE | Facility: CLINIC | Age: 65
End: 2025-06-23

## 2025-06-23 ENCOUNTER — OFFICE VISIT (OUTPATIENT)
Dept: NEUROLOGY | Facility: CLINIC | Age: 65
End: 2025-06-23
Attending: NURSE PRACTITIONER
Payer: COMMERCIAL

## 2025-06-23 DIAGNOSIS — G56.03 BILATERAL CARPAL TUNNEL SYNDROME: ICD-10-CM

## 2025-06-23 DIAGNOSIS — R20.0 NUMBNESS AND TINGLING IN BOTH HANDS: Primary | ICD-10-CM

## 2025-06-23 DIAGNOSIS — R20.2 NUMBNESS AND TINGLING IN BOTH HANDS: Primary | ICD-10-CM

## 2025-06-23 DIAGNOSIS — G56.00 SEVERE CARPAL TUNNEL SYNDROME, UNSPECIFIED LATERALITY: Primary | ICD-10-CM

## 2025-06-23 PROCEDURE — 95886 MUSC TEST DONE W/N TEST COMP: CPT | Mod: RT | Performed by: PSYCHIATRY & NEUROLOGY

## 2025-06-23 PROCEDURE — 95912 NRV CNDJ TEST 11-12 STUDIES: CPT | Performed by: PSYCHIATRY & NEUROLOGY

## 2025-06-23 NOTE — PROCEDURES
ELECTROMYOGRAPHY (EMG) REPORT       [facility]UR[facility] [place]  [address] [place] [address]  Tel: [phone]  Fax: [phone]  [email or web address]     Stevendenver Buck,  1960, MRN [id number]  PCP: Lety Barger  Date: 2025     Principal Diagnosis: Bilateral carpal tunnel syndrome     Height: 5 feet 9 inch  Reason for referral: Evaluate bilateral uppers. c/o numbness, tingling in both arms/hands/fingers > 4 years. Right > Left. h/o right arm surgery.      Motor NCS      Nerve / Sites Lat Amp Dist Selwyn    ms mV cm m/s   R Median - APB      Wrist 7.92 5.8 7       Elbow 13.21 5.0 25 47   L Median - APB      Wrist 7.15 5.8 7       Elbow 11.92 5.6 23 48   R Ulnar - ADM      Wrist 2.96 9.3 7       B.Elbow 6.10 9.2 17 54      A.Elbow 8.67 8.0 14 55   L Ulnar - ADM      Wrist 3.13 11.3 7       B.Elbow 5.98 10.4 16.5 58      A.Elbow 8.56 10.6 14 54       F  Wave      Nerve Fmin    ms   R Ulnar - ADM 29.64   L Ulnar - ADM 29.84       Sensory NCS      Nerve / Sites Onset Lat Pk Lat Amp.2-3 Dist Selwyn Lat Diff    ms ms  V cm m/s ms   R Median - II (Antidr)      Wrist 5.65 6.75 4.9 13 23    L Median - II (Antidr)      Wrist 5.94 7.54 4.1 13 22    R Ulnar - V (Antidr)      Wrist 2.60 3.27 20.2 11 42    L Ulnar - V (Antidr)      Wrist 1.96 2.85 13.9 11 56    R Median, Ulnar - Transcarpal comparison      Median Palm NR NR NR 8 NR       Ulnar Palm 1.75 2.29 20.3 8 46          NR   L Median, Ulnar - Transcarpal comparison      Median Palm NR NR NR 8 NR       Ulnar Palm 1.73 2.10 10.1 8 46          NR       EMG Summary Table     Spontaneous MUAP Rcmt Note   Muscle Fib PSW Fasc IA # Amp Dur PPP Rate Type   R. Brachioradialis None None None N N N N N N N   R. Pronator teres None None None N N N N N N N   R. Biceps brachii None None None N N N N N N N   R. Deltoid None None None N N N N N N N   R. Triceps brachii None None None N Sl Red Incr Incr N N N   R. Anconeus None None None N  Sl Red Incr Incr N N N   R. Extensor digitorum communis None None None N Sl Red Incr Incr N N N   R. Extensor indicis proprius None None None N Sl Red Incr Incr N N N   R. Flexor carpi ulnaris None None None N Sl Red Incr Incr N N N   R. Abductor digiti minimi (manus) None None None N Sl Red Incr Incr N N N   R. First dorsal interosseous None None None N Sl Red Incr Incr N N N   R. Abductor pollicis brevis None None None N Mod Red Incr Incr N N N   L. Brachioradialis None None None N N N N N N N   L. Pronator teres None None None N N N N N N N   L. Biceps brachii None None None N N N N N N N   L. Deltoid None None None N N N N N N N   L. Triceps brachii None None None N N N N N N N   L. Flexor carpi ulnaris None None None N N N N N N N   L. First dorsal interosseous None None None N N N N N N N   L. Abductor pollicis brevis None None None N Mod Red Incr Incr N N N        Summary: Nerve conduction and EMG study of bilateral upper extremity shows:  Delayed bilateral median distal motor latencies with normal amplitude and conduction velocity.  Normal bilateral ulnar distal motor latencies, amplitudes and conduction velocities.  Normal bilateral ulnar F latencies  Delayed bilateral median peak sensory latency with slow sensory nerve conduction velocities.  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  Any grammatical or context distortions are unintentional and inherent to the software.

## 2025-06-23 NOTE — LETTER
6/23/2025      Steven Buck  [address] [address]      Dear Colleague,    Thank you for referring your patient, Steven Buck, to the [facility] [place]. Please see a copy of my visit note below.    See procedure note for EMG report    Again, thank you for allowing me to participate in the care of your patient.        Sincerely,        Calixto Alas MD    Electronically signed

## 2025-06-24 ENCOUNTER — PATIENT OUTREACH (OUTPATIENT)
Dept: CARE COORDINATION | Facility: CLINIC | Age: 65
End: 2025-06-24
Payer: COMMERCIAL

## 2025-06-30 NOTE — PROGRESS NOTES
"CHIEF COMPLAINT:   Chief Complaint   Patient presents with    Cts     Bilateral carpal tunnel syndrome. Onset: 5 years. Patient notes it has gotten worse in the last couple of years. Left hand is worse, numbness most of the time. Hands feel heavy. N/T in all fingers. He has trouble with gripping and drops stuff. No tx.        Steven Bcuk is seen today in the [facility] for evaluation of bilateral carpal tunnel syndrome  at the request of Lety Barger         HISTORY:  Steven Buck is a 64 year old male , Right -hand dominant who is seen in for Bilateral hand numbness and tingling, pain, and weakness x 5 years, worse the past couple of years. Right more than left. Right side is numbness and tingling 95% of the time, left side 70% of the time.  he has numbness and tingling in all digits. His hands feel \"heavy\". Other symptoms include dropping things, pain up arm, and gripping difficulties.          Treatment has been   night bracing without relief. Takes over the counter pain medications for other body aches/pains but doesn't seem to help the hands.    Has not tried a physical therapy and injections.        occasional neck pain.      Suspected cause: Due to unknown factors.    Pain severity: 5/10  Pain quality: dull, aching, throbbing, and burning  Frequency of symptoms: are constant.  Aggravating Factors: night  Relieving Factors: none.  Previous modalities tried: a splint and NSAIDs   Usual level of work activity: retired .       Other PMH:  has a past medical history of Hypertension.  Patient Active Problem List   Diagnosis    Hyperlipidemia LDL goal <100    Hypertension goal BP (blood pressure) < 140/90    History of Clostridium difficile infection    Acute left-sided low back pain with left-sided sciatica    Arthralgia of both knees    Colon adenoma    Hip pain, left    Other fatigue    Numbness and tingling in both hands       Surgical Hx:  has a " "past surgical history that includes surgical history of -  (1995); surgical history of -  (1/16/97); orthopedic surgery; Repair tendon biceps distal upper extremity (6/27/2014); Colonoscopy (N/A, 3/14/2025); and Colonoscopy (N/A, 3/14/2025).    Medications:   Current Outpatient Medications:     amLODIPine (NORVASC) 10 MG tablet, Take 1 tablet (10 mg) by mouth daily., Disp: 90 tablet, Rfl: 3    atorvastatin (LIPITOR) 20 MG tablet, Take 1 tablet (20 mg) by mouth daily., Disp: 90 tablet, Rfl: 3    lisinopril-hydrochlorothiazide (ZESTORETIC) 20-25 MG tablet, Take 1 tablet by mouth daily., Disp: 90 tablet, Rfl: 3    Multiple Vitamins-Minerals (ONE-A-DAY 50 PLUS PO), Take 1 tablet by mouth daily, Disp: , Rfl:     Allergies:   Allergies   Allergen Reactions    Nkda [No Known Drug Allergy]        Social Hx: retired .  reports that he quit smoking about 39 years ago. His smoking use included cigarettes. He started smoking about 40 years ago. He has never used smokeless tobacco. He reports current alcohol use. He reports that he does not use drugs.    Family Hx: family history includes C.A.D. in his father; Cardiovascular in his father; Hypertension in his brother, father, maternal grandfather, maternal grandmother, and mother..    REVIEW OF SYSTEMS: 10 point ROS neg other than the symptoms noted above in the HPI and PMH. Notables include  CONSTITUTIONAL:NEGATIVE for fever, chills, change in weight  INTEGUMENTARY/SKIN: NEGATIVE for worrisome rashes, moles or lesions  MUSCULOSKELETAL:See HPI above  Neurology: see HPI above.      EXAM:  Ht 1.727 m (5' 8\")   Wt 96.3 kg (212 lb 4.8 oz)   BMI 32.28 kg/m    GENERAL APPEARANCE: healthy, alert and no distress   GAIT: NORMAL  SKIN: no suspicious lesions or rashes  RESPIRATORY: No increased work of breathing.  NEURO:     strength: decreased,  right more than left.   Thenar atrophy:Moderate. Right more than left.   Sensation diminished in  all digits,    reflexes normal in " upper extremities.   PSYCH:  mentation appears normal and affect normal, not anxious.    MUSCULOSKELETAL:    RIGHT HAND/FINGERS:    Skin intact. No abnormal skin discoloration, erythema or ecchymosis.   Very sweaty palm.    No nail pitting or clubbing.  Normal wear pattern, color and tone.  No observable or palpable masses of the fingers or palm or wrist.  No palpable triggering of fingers.   No observable or palpable cords or nodules of the fingers or palm.  Notable degenerative changes of the thumb and IP joints of all fingers.    There is no swelling in the wrist, hand, forearm.  There is mild tenderness in the wrist.  There is no ecchymosis.  There is no erythema of the surrounding skin.  There is no maceration of the skin.  There is degenerative deformity in the area.  Intact extensors. No extensor lag.    Special tests wrist:    Tinel's positive,    Phalen's positive.    Flexion/compression test positive.   Finkelstein's test: negative.     Special tests medial elbow ulnar nerve:    Tinel's negative,    Flexion/compression test negative.       1st carpometacarpal grind: positive      Intact epl fpl fdp edc wrist flexion/extension biceps/triceps deltoid  Brisk capillary refill to all fingers.   Palpable radial pulse, 2+.      LEFT HAND/FINGERS:    Skin intact. No abnormal skin discoloration, erythema or ecchymosis.   Very sweaty palm.    No nail pitting or clubbing.  Normal wear pattern, color and tone.  No observable or palpable masses of the fingers or palm or wrist.  No palpable triggering of fingers.   No observable or palpable cords or nodules of the fingers or palm.  Notable degenerative changes of the thumb and IP joints of all fingers.    There is no swelling in the wrist, hand, forearm.  There is mild tenderness in the wrist.  There is no ecchymosis.  There is no erythema of the surrounding skin.  There is no maceration of the skin.  There is degenerative deformity in the area.  Intact extensors. No  extensor lag.    Special tests wrist:    Tinel's positive,    Phalen's positive.    Flexion/compression test positive.   Finkelstein's test: negative.     Special tests medial elbow ulnar nerve:    Tinel's negative,    Flexion/compression test negative.       1st carpometacarpal grind: positive      Intact epl fpl fdp edc wrist flexion/extension biceps/triceps deltoid  Brisk capillary refill to all fingers.   Palpable radial pulse, 2+.             XRAYS: none indicated.    SPECIAL STUDIES:  EMG: Severe CTS bilateral side.  6/23/2025 Dr Alas.  This is a abnormal nerve conduction and EMG study of bilateral upper extremities that suggests median neuropathy at or distal to the wrist consistent with severe bilateral carpal tunnel syndrome       ASSESSMENT/PLAN: 63yo RHD male with bilateral hand pain, numbness and tingling, severe carpal tunnel syndrome .    * discussed with patient signs and symptoms consistent with carpal tunnel syndrome. Carpal tunnel syndrome is compression or pinching of the median nerve at the wrist, as it enters the hand. There are many different causes, and in most cases, multifactorial.    * An indepth discussion was had with him about the options for treatment, which included activity modification to avoid aggravating activities, taking breaks during activities that cause symptoms, stretching, NSAIDS to help decrease inflammation and swelling within the carpal tunnel, night splinting, corticosteroid injections, and carpal tunnel release.   * depending upon severity and duration of symptoms, nonoperative treatment is usually initiated, starting with least invasive modalities such as activity modification and a trial of night splints and NSAIDs.  * Cortisone injections are considered to decrease swelling and inflammation within the carpal tunnel and compression of the nerve.   * Lastly, carpal tunnel release should symptoms persist despite trial of nonoperative treatment, or in cases of severe  carpal tunnel syndrome.  * risks of surgery, including, but not limited to: bleeding, infection, pain, scar, damage to adjacent structures (nerves, vessels, tendons), temporary versus permanent nerve injury, failure to relieve symptoms, recurrence of symptoms, incomplete release, stiffness, scar sensitivity and tenderness, need for further surgery, risks of anesthesia were discussed.  * in some cases, with severe, prolonged symptoms, or in situations of underlying peripheral neuropathy, or cervical radiculopahty, there may be permanent nerve changes not amenable to surgery, that even with surgery, may not resolve.  * in some cases, it may take 6 months to a year or longer for symptoms to improve or resolve, but even then may not completely resolve.    * plan: staged bilateral carpal tunnel release, starting with the right followed by the left side 3 weeks later, local anesthetic only, outpatient procedure.    * will schedule in future at a mutual convenience  * no H+P needed if local only.,  * return to clinic  2 weeks postoperative for wound check, suture removal, sooner if needed..  * all patient's questions addressed and answered today.    * All questions were addressed and answered prior to discharge from clinic today. The patient acknowledges an understanding of and agreement with the plan set forth during today's visit. Patient was advised to call our office or MyChart us if any further questions arise upon leaving our office today.      Johnathan Baker M.D., M.S.  Dept. of Orthopaedic Surgery  [facility]

## 2025-06-30 NOTE — TELEPHONE ENCOUNTER
Covering for PCP.  This can wait until PCP is back as patient said his appt with ortho is not until the fall.

## 2025-07-02 ENCOUNTER — PATIENT OUTREACH (OUTPATIENT)
Dept: CARE COORDINATION | Facility: CLINIC | Age: 65
End: 2025-07-02
Payer: COMMERCIAL

## 2025-07-05 ENCOUNTER — PATIENT OUTREACH (OUTPATIENT)
Dept: CARE COORDINATION | Facility: CLINIC | Age: 65
End: 2025-07-05
Payer: COMMERCIAL

## 2025-07-08 ENCOUNTER — MYC REFILL (OUTPATIENT)
Dept: FAMILY MEDICINE | Facility: CLINIC | Age: 65
End: 2025-07-08
Payer: COMMERCIAL

## 2025-07-08 DIAGNOSIS — I10 HYPERTENSION GOAL BP (BLOOD PRESSURE) < 140/90: ICD-10-CM

## 2025-07-08 DIAGNOSIS — E78.5 HYPERLIPIDEMIA LDL GOAL <100: ICD-10-CM

## 2025-07-08 RX ORDER — LISINOPRIL AND HYDROCHLOROTHIAZIDE 20; 25 MG/1; MG/1
1 TABLET ORAL DAILY
Qty: 90 TABLET | Refills: 3 | Status: CANCELLED | OUTPATIENT
Start: 2025-07-08

## 2025-07-08 RX ORDER — ATORVASTATIN CALCIUM 20 MG/1
20 TABLET, FILM COATED ORAL DAILY
Qty: 90 TABLET | Refills: 3 | Status: CANCELLED | OUTPATIENT
Start: 2025-07-08

## 2025-07-08 RX ORDER — AMLODIPINE BESYLATE 10 MG/1
10 TABLET ORAL DAILY
Qty: 90 TABLET | Refills: 3 | Status: CANCELLED | OUTPATIENT
Start: 2025-07-08

## 2025-07-09 ENCOUNTER — TELEPHONE (OUTPATIENT)
Dept: SURGERY | Facility: CLINIC | Age: 65
End: 2025-07-09

## 2025-07-09 ENCOUNTER — OFFICE VISIT (OUTPATIENT)
Dept: ORTHOPEDICS | Facility: CLINIC | Age: 65
End: 2025-07-09
Attending: NURSE PRACTITIONER
Payer: COMMERCIAL

## 2025-07-09 VITALS — BODY MASS INDEX: 32.18 KG/M2 | HEIGHT: 68 IN | WEIGHT: 212.3 LBS

## 2025-07-09 DIAGNOSIS — G56.00 SEVERE CARPAL TUNNEL SYNDROME, UNSPECIFIED LATERALITY: ICD-10-CM

## 2025-07-09 PROCEDURE — 1125F AMNT PAIN NOTED PAIN PRSNT: CPT | Performed by: ORTHOPAEDIC SURGERY

## 2025-07-09 PROCEDURE — 99243 OFF/OP CNSLTJ NEW/EST LOW 30: CPT | Performed by: ORTHOPAEDIC SURGERY

## 2025-07-09 ASSESSMENT — PAIN SCALES - GENERAL: PAINLEVEL_OUTOF10: MODERATE PAIN (5)

## 2025-07-09 NOTE — LETTER
"7/9/2025      Steven Buck  [address] [place] [address]      Dear Colleague,    Thank you for referring your patient, Steven Buck, to the [facility] [place]. Please see a copy of my visit note below.    CHIEF COMPLAINT:   Chief Complaint   Patient presents with     Cts     Bilateral carpal tunnel syndrome. Onset: 5 years. Patient notes it has gotten worse in the last couple of years. Left hand is worse, numbness most of the time. Hands feel heavy. N/T in all fingers. He has trouble with gripping and drops stuff. No tx.        Steven Buck is seen today in the [facility] for evaluation of bilateral carpal tunnel syndrome  at the request of Lety Barger         HISTORY:  Steven Buck is a 64 year old male , Right -hand dominant who is seen in for Bilateral hand numbness and tingling, pain, and weakness x 5 years, worse the past couple of years. Right more than left. Right side is numbness and tingling 95% of the time, left side 70% of the time.  he has numbness and tingling in all digits. His hands feel \"heavy\". Other symptoms include dropping things, pain up arm, and gripping difficulties.          Treatment has been   night bracing without relief. Takes over the counter pain medications for other body aches/pains but doesn't seem to help the hands.    Has not tried a physical therapy and injections.        occasional neck pain.      Suspected cause: Due to unknown factors.    Pain severity: 5/10  Pain quality: dull, aching, throbbing, and burning  Frequency of symptoms: are constant.  Aggravating Factors: night  Relieving Factors: none.  Previous modalities tried: a splint and NSAIDs   Usual level of work activity: retired .       Other PMH:  has a past medical history of Hypertension.  Patient Active Problem List   Diagnosis     Hyperlipidemia LDL goal <100     Hypertension goal BP (blood pressure) < 140/90 " "    History of Clostridium difficile infection     Acute left-sided low back pain with left-sided sciatica     Arthralgia of both knees     Colon adenoma     Hip pain, left     Other fatigue     Numbness and tingling in both hands       Surgical Hx:  has a past surgical history that includes surgical history of -  (1995); surgical history of -  (1/16/97); orthopedic surgery; Repair tendon biceps distal upper extremity (6/27/2014); Colonoscopy (N/A, 3/14/2025); and Colonoscopy (N/A, 3/14/2025).    Medications:   Current Outpatient Medications:      amLODIPine (NORVASC) 10 MG tablet, Take 1 tablet (10 mg) by mouth daily., Disp: 90 tablet, Rfl: 3     atorvastatin (LIPITOR) 20 MG tablet, Take 1 tablet (20 mg) by mouth daily., Disp: 90 tablet, Rfl: 3     lisinopril-hydrochlorothiazide (ZESTORETIC) 20-25 MG tablet, Take 1 tablet by mouth daily., Disp: 90 tablet, Rfl: 3     Multiple Vitamins-Minerals (ONE-A-DAY 50 PLUS PO), Take 1 tablet by mouth daily, Disp: , Rfl:     Allergies:   Allergies   Allergen Reactions     Nkda [No Known Drug Allergy]        Social Hx: retired .  reports that he quit smoking about 39 years ago. His smoking use included cigarettes. He started smoking about 40 years ago. He has never used smokeless tobacco. He reports current alcohol use. He reports that he does not use drugs.    Family Hx: family history includes C.A.D. in his father; Cardiovascular in his father; Hypertension in his brother, father, maternal grandfather, maternal grandmother, and mother..    REVIEW OF SYSTEMS: 10 point ROS neg other than the symptoms noted above in the HPI and PMH. Notables include  CONSTITUTIONAL:NEGATIVE for fever, chills, change in weight  INTEGUMENTARY/SKIN: NEGATIVE for worrisome rashes, moles or lesions  MUSCULOSKELETAL:See HPI above  Neurology: see HPI above.      EXAM:  Ht 1.727 m (5' 8\")   Wt 96.3 kg (212 lb 4.8 oz)   BMI 32.28 kg/m    GENERAL APPEARANCE: healthy, alert and no distress   GAIT: " NORMAL  SKIN: no suspicious lesions or rashes  RESPIRATORY: No increased work of breathing.  NEURO:     strength: decreased,  right more than left.   Thenar atrophy:Moderate. Right more than left.   Sensation diminished in  all digits,    reflexes normal in upper extremities.   PSYCH:  mentation appears normal and affect normal, not anxious.    MUSCULOSKELETAL:    RIGHT HAND/FINGERS:    Skin intact. No abnormal skin discoloration, erythema or ecchymosis.   Very sweaty palm.    No nail pitting or clubbing.  Normal wear pattern, color and tone.  No observable or palpable masses of the fingers or palm or wrist.  No palpable triggering of fingers.   No observable or palpable cords or nodules of the fingers or palm.  Notable degenerative changes of the thumb and IP joints of all fingers.    There is no swelling in the wrist, hand, forearm.  There is mild tenderness in the wrist.  There is no ecchymosis.  There is no erythema of the surrounding skin.  There is no maceration of the skin.  There is degenerative deformity in the area.  Intact extensors. No extensor lag.    Special tests wrist:    Tinel's positive,    Phalen's positive.    Flexion/compression test positive.   Finkelstein's test: negative.     Special tests medial elbow ulnar nerve:    Tinel's negative,    Flexion/compression test negative.       1st carpometacarpal grind: positive      Intact epl fpl fdp edc wrist flexion/extension biceps/triceps deltoid  Brisk capillary refill to all fingers.   Palpable radial pulse, 2+.      LEFT HAND/FINGERS:    Skin intact. No abnormal skin discoloration, erythema or ecchymosis.   Very sweaty palm.    No nail pitting or clubbing.  Normal wear pattern, color and tone.  No observable or palpable masses of the fingers or palm or wrist.  No palpable triggering of fingers.   No observable or palpable cords or nodules of the fingers or palm.  Notable degenerative changes of the thumb and IP joints of all fingers.    There  is no swelling in the wrist, hand, forearm.  There is mild tenderness in the wrist.  There is no ecchymosis.  There is no erythema of the surrounding skin.  There is no maceration of the skin.  There is degenerative deformity in the area.  Intact extensors. No extensor lag.    Special tests wrist:    Tinel's positive,    Phalen's positive.    Flexion/compression test positive.   Finkelstein's test: negative.     Special tests medial elbow ulnar nerve:    Tinel's negative,    Flexion/compression test negative.       1st carpometacarpal grind: positive      Intact epl fpl fdp edc wrist flexion/extension biceps/triceps deltoid  Brisk capillary refill to all fingers.   Palpable radial pulse, 2+.             XRAYS: none indicated.    SPECIAL STUDIES:  EMG: Severe CTS bilateral side.  6/23/2025 Dr Alas.  This is a abnormal nerve conduction and EMG study of bilateral upper extremities that suggests median neuropathy at or distal to the wrist consistent with severe bilateral carpal tunnel syndrome       ASSESSMENT/PLAN: 63yo RHD male with bilateral hand pain, numbness and tingling, severe carpal tunnel syndrome .    * discussed with patient signs and symptoms consistent with carpal tunnel syndrome. Carpal tunnel syndrome is compression or pinching of the median nerve at the wrist, as it enters the hand. There are many different causes, and in most cases, multifactorial.    * An indepth discussion was had with him about the options for treatment, which included activity modification to avoid aggravating activities, taking breaks during activities that cause symptoms, stretching, NSAIDS to help decrease inflammation and swelling within the carpal tunnel, night splinting, corticosteroid injections, and carpal tunnel release.   * depending upon severity and duration of symptoms, nonoperative treatment is usually initiated, starting with least invasive modalities such as activity modification and a trial of night splints and  NSAIDs.  * Cortisone injections are considered to decrease swelling and inflammation within the carpal tunnel and compression of the nerve.   * Lastly, carpal tunnel release should symptoms persist despite trial of nonoperative treatment, or in cases of severe carpal tunnel syndrome.  * risks of surgery, including, but not limited to: bleeding, infection, pain, scar, damage to adjacent structures (nerves, vessels, tendons), temporary versus permanent nerve injury, failure to relieve symptoms, recurrence of symptoms, incomplete release, stiffness, scar sensitivity and tenderness, need for further surgery, risks of anesthesia were discussed.  * in some cases, with severe, prolonged symptoms, or in situations of underlying peripheral neuropathy, or cervical radiculopahty, there may be permanent nerve changes not amenable to surgery, that even with surgery, may not resolve.  * in some cases, it may take 6 months to a year or longer for symptoms to improve or resolve, but even then may not completely resolve.    * plan: staged bilateral carpal tunnel release, starting with the right followed by the left side 3 weeks later, local anesthetic only, outpatient procedure.    * will schedule in future at a mutual convenience  * no H+P needed if local only.,  * return to clinic  2 weeks postoperative for wound check, suture removal, sooner if needed..  * all patient's questions addressed and answered today.    * All questions were addressed and answered prior to discharge from clinic today. The patient acknowledges an understanding of and agreement with the plan set forth during today's visit. Patient was advised to call our office or MyChart us if any further questions arise upon leaving our office today.      Johnathan Baekr M.D., M.S.  Dept. of Orthopaedic Surgery  [facility]      Again, thank you for allowing me to participate in the care of your patient.        Sincerely,        Johnathan Baker,  MD    Electronically signed

## 2025-07-09 NOTE — TELEPHONE ENCOUNTER
Type of surgery: RELEASE, left CARPAL TUNNEL (Left)   Location of surgery: [place] [facility]  Date and time of surgery: 10/7  Surgeon: yesica   Pre-Op Appt Date: not needed - Local   Post-Op Appt Date: 10/22   Packet sent out: Yes  Pre-cert/Authorization completed:  Not Applicable  Date:

## 2025-07-09 NOTE — TELEPHONE ENCOUNTER
Type of surgery: RELEASE, right CARPAL TUNNEL (Right)   Location of surgery: [facility]  Date and time of surgery: 9/16  Surgeon: yesica  Pre-Op Appt Date: not needed - Local   Post-Op Appt Date: 10/1   Packet sent out: Yes  Pre-cert/Authorization completed:  Not Applicable  Date:

## 2025-07-28 ENCOUNTER — TELEPHONE (OUTPATIENT)
Dept: FAMILY MEDICINE | Facility: CLINIC | Age: 65
End: 2025-07-28
Payer: COMMERCIAL

## (undated) DEVICE — ENDO FORCEP ENDOJAW BIOPSY 3.7MMX230CM FB-222U

## (undated) DEVICE — ENDO SNARE EXACTO COLD 9MM LOOP 2.4MMX230CM 00711115